# Patient Record
Sex: FEMALE | Race: WHITE | URBAN - METROPOLITAN AREA
[De-identification: names, ages, dates, MRNs, and addresses within clinical notes are randomized per-mention and may not be internally consistent; named-entity substitution may affect disease eponyms.]

---

## 2019-09-13 ENCOUNTER — INPATIENT (INPATIENT)
Facility: HOSPITAL | Age: 84
LOS: 3 days | Discharge: HOME | DRG: 536 | End: 2019-09-17
Attending: INTERNAL MEDICINE | Admitting: HOSPITALIST
Payer: MEDICARE

## 2019-09-13 VITALS
TEMPERATURE: 98 F | DIASTOLIC BLOOD PRESSURE: 65 MMHG | RESPIRATION RATE: 16 BRPM | HEIGHT: 63 IN | SYSTOLIC BLOOD PRESSURE: 112 MMHG | OXYGEN SATURATION: 95 % | WEIGHT: 139.99 LBS | HEART RATE: 78 BPM

## 2019-09-13 PROCEDURE — 99284 EMERGENCY DEPT VISIT MOD MDM: CPT

## 2019-09-13 PROCEDURE — 72170 X-RAY EXAM OF PELVIS: CPT | Mod: 26,59

## 2019-09-13 PROCEDURE — 73130 X-RAY EXAM OF HAND: CPT | Mod: 26,LT

## 2019-09-13 PROCEDURE — 73502 X-RAY EXAM HIP UNI 2-3 VIEWS: CPT | Mod: 26,LT

## 2019-09-13 PROCEDURE — 73552 X-RAY EXAM OF FEMUR 2/>: CPT | Mod: 26,LT

## 2019-09-13 PROCEDURE — 72192 CT PELVIS W/O DYE: CPT | Mod: 26

## 2019-09-13 RX ORDER — OXYCODONE AND ACETAMINOPHEN 5; 325 MG/1; MG/1
1 TABLET ORAL ONCE
Refills: 0 | Status: DISCONTINUED | OUTPATIENT
Start: 2019-09-13 | End: 2019-09-13

## 2019-09-13 RX ADMIN — OXYCODONE AND ACETAMINOPHEN 1 TABLET(S): 5; 325 TABLET ORAL at 20:06

## 2019-09-13 RX ADMIN — OXYCODONE AND ACETAMINOPHEN 1 TABLET(S): 5; 325 TABLET ORAL at 20:03
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
## 2019-09-13 NOTE — ED ADULT NURSE NOTE - NSIMPLEMENTINTERV_GEN_ALL_ED
Implemented All Fall Risk Interventions:  [place] to call system. Call bell, personal items and telephone within reach. Instruct patient to call for assistance. Room bathroom lighting operational. Non-slip footwear when patient is off stretcher. Physically safe environment: no spills, clutter or unnecessary equipment. Stretcher in lowest position, wheels locked, appropriate side rails in place. Provide visual cue, wrist band, yellow gown, etc. Monitor gait and stability. Monitor for mental status changes and reorient to person, place, and time. Review medications for side effects contributing to fall risk. Reinforce activity limits and safety measures with patient and family.
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
## 2019-09-14 DIAGNOSIS — Z98.1 ARTHRODESIS STATUS: Chronic | ICD-10-CM

## 2019-09-14 DIAGNOSIS — S72.002A FRACTURE OF UNSPECIFIED PART OF NECK OF LEFT FEMUR, INITIAL ENCOUNTER FOR CLOSED FRACTURE: ICD-10-CM

## 2019-09-14 DIAGNOSIS — I10 ESSENTIAL (PRIMARY) HYPERTENSION: ICD-10-CM

## 2019-09-14 DIAGNOSIS — Z98.890 OTHER SPECIFIED POSTPROCEDURAL STATES: Chronic | ICD-10-CM

## 2019-09-14 DIAGNOSIS — Z96.642 PRESENCE OF LEFT ARTIFICIAL HIP JOINT: Chronic | ICD-10-CM

## 2019-09-14 DIAGNOSIS — E78.5 HYPERLIPIDEMIA, UNSPECIFIED: ICD-10-CM

## 2019-09-14 DIAGNOSIS — E87.1 HYPO-OSMOLALITY AND HYPONATREMIA: ICD-10-CM

## 2019-09-14 DIAGNOSIS — S62.309A UNSPECIFIED FRACTURE OF UNSPECIFIED METACARPAL BONE, INITIAL ENCOUNTER FOR CLOSED FRACTURE: ICD-10-CM

## 2019-09-14 LAB
ALBUMIN SERPL ELPH-MCNC: 4.2 G/DL — SIGNIFICANT CHANGE UP (ref 3.3–5.2)
ALP SERPL-CCNC: 60 U/L — SIGNIFICANT CHANGE UP (ref 40–120)
ALT FLD-CCNC: 37 U/L — HIGH
ANION GAP SERPL CALC-SCNC: 11 MMOL/L — SIGNIFICANT CHANGE UP (ref 5–17)
ANION GAP SERPL CALC-SCNC: 12 MMOL/L — SIGNIFICANT CHANGE UP (ref 5–17)
ANION GAP SERPL CALC-SCNC: 13 MMOL/L — SIGNIFICANT CHANGE UP (ref 5–17)
APTT BLD: 29.2 SEC — SIGNIFICANT CHANGE UP (ref 27.5–36.3)
AST SERPL-CCNC: 39 U/L — HIGH
BASOPHILS # BLD AUTO: 0.02 K/UL — SIGNIFICANT CHANGE UP (ref 0–0.2)
BASOPHILS NFR BLD AUTO: 0.3 % — SIGNIFICANT CHANGE UP (ref 0–2)
BILIRUB SERPL-MCNC: 0.6 MG/DL — SIGNIFICANT CHANGE UP (ref 0.4–2)
BLD GP AB SCN SERPL QL: SIGNIFICANT CHANGE UP
BUN SERPL-MCNC: 15 MG/DL — SIGNIFICANT CHANGE UP (ref 8–20)
BUN SERPL-MCNC: 15 MG/DL — SIGNIFICANT CHANGE UP (ref 8–20)
BUN SERPL-MCNC: 17 MG/DL — SIGNIFICANT CHANGE UP (ref 8–20)
CALCIUM SERPL-MCNC: 8.9 MG/DL — SIGNIFICANT CHANGE UP (ref 8.6–10.2)
CALCIUM SERPL-MCNC: 9.1 MG/DL — SIGNIFICANT CHANGE UP (ref 8.6–10.2)
CALCIUM SERPL-MCNC: 9.2 MG/DL — SIGNIFICANT CHANGE UP (ref 8.6–10.2)
CHLORIDE SERPL-SCNC: 89 MMOL/L — LOW (ref 98–107)
CHLORIDE SERPL-SCNC: 90 MMOL/L — LOW (ref 98–107)
CHLORIDE SERPL-SCNC: 92 MMOL/L — LOW (ref 98–107)
CO2 SERPL-SCNC: 24 MMOL/L — SIGNIFICANT CHANGE UP (ref 22–29)
CO2 SERPL-SCNC: 26 MMOL/L — SIGNIFICANT CHANGE UP (ref 22–29)
CO2 SERPL-SCNC: 26 MMOL/L — SIGNIFICANT CHANGE UP (ref 22–29)
CREAT SERPL-MCNC: 0.54 MG/DL — SIGNIFICANT CHANGE UP (ref 0.5–1.3)
CREAT SERPL-MCNC: 0.7 MG/DL — SIGNIFICANT CHANGE UP (ref 0.5–1.3)
CREAT SERPL-MCNC: 0.82 MG/DL — SIGNIFICANT CHANGE UP (ref 0.5–1.3)
EOSINOPHIL # BLD AUTO: 0.24 K/UL — SIGNIFICANT CHANGE UP (ref 0–0.5)
EOSINOPHIL NFR BLD AUTO: 3.1 % — SIGNIFICANT CHANGE UP (ref 0–6)
GLUCOSE SERPL-MCNC: 117 MG/DL — HIGH (ref 70–115)
GLUCOSE SERPL-MCNC: 85 MG/DL — SIGNIFICANT CHANGE UP (ref 70–115)
GLUCOSE SERPL-MCNC: 98 MG/DL — SIGNIFICANT CHANGE UP (ref 70–115)
HCT VFR BLD CALC: 32.1 % — LOW (ref 34.5–45)
HGB BLD-MCNC: 11 G/DL — LOW (ref 11.5–15.5)
IMM GRANULOCYTES NFR BLD AUTO: 0.3 % — SIGNIFICANT CHANGE UP (ref 0–1.5)
INR BLD: 1.1 RATIO — SIGNIFICANT CHANGE UP (ref 0.88–1.16)
LYMPHOCYTES # BLD AUTO: 1.32 K/UL — SIGNIFICANT CHANGE UP (ref 1–3.3)
LYMPHOCYTES # BLD AUTO: 17.1 % — SIGNIFICANT CHANGE UP (ref 13–44)
MAGNESIUM SERPL-MCNC: 1.8 MG/DL — SIGNIFICANT CHANGE UP (ref 1.6–2.6)
MCHC RBC-ENTMCNC: 33 PG — SIGNIFICANT CHANGE UP (ref 27–34)
MCHC RBC-ENTMCNC: 34.3 GM/DL — SIGNIFICANT CHANGE UP (ref 32–36)
MCV RBC AUTO: 96.4 FL — SIGNIFICANT CHANGE UP (ref 80–100)
MONOCYTES # BLD AUTO: 0.75 K/UL — SIGNIFICANT CHANGE UP (ref 0–0.9)
MONOCYTES NFR BLD AUTO: 9.7 % — SIGNIFICANT CHANGE UP (ref 2–14)
NEUTROPHILS # BLD AUTO: 5.38 K/UL — SIGNIFICANT CHANGE UP (ref 1.8–7.4)
NEUTROPHILS NFR BLD AUTO: 69.5 % — SIGNIFICANT CHANGE UP (ref 43–77)
PHOSPHATE SERPL-MCNC: 4.1 MG/DL — SIGNIFICANT CHANGE UP (ref 2.4–4.7)
PLATELET # BLD AUTO: 176 K/UL — SIGNIFICANT CHANGE UP (ref 150–400)
POTASSIUM SERPL-MCNC: 3.8 MMOL/L — SIGNIFICANT CHANGE UP (ref 3.5–5.3)
POTASSIUM SERPL-MCNC: 3.8 MMOL/L — SIGNIFICANT CHANGE UP (ref 3.5–5.3)
POTASSIUM SERPL-MCNC: 4 MMOL/L — SIGNIFICANT CHANGE UP (ref 3.5–5.3)
POTASSIUM SERPL-SCNC: 3.8 MMOL/L — SIGNIFICANT CHANGE UP (ref 3.5–5.3)
POTASSIUM SERPL-SCNC: 3.8 MMOL/L — SIGNIFICANT CHANGE UP (ref 3.5–5.3)
POTASSIUM SERPL-SCNC: 4 MMOL/L — SIGNIFICANT CHANGE UP (ref 3.5–5.3)
PROT SERPL-MCNC: 7 G/DL — SIGNIFICANT CHANGE UP (ref 6.6–8.7)
PROTHROM AB SERPL-ACNC: 12.7 SEC — SIGNIFICANT CHANGE UP (ref 10–12.9)
RBC # BLD: 3.33 M/UL — LOW (ref 3.8–5.2)
RBC # FLD: 11.9 % — SIGNIFICANT CHANGE UP (ref 10.3–14.5)
SODIUM SERPL-SCNC: 127 MMOL/L — LOW (ref 135–145)
SODIUM SERPL-SCNC: 127 MMOL/L — LOW (ref 135–145)
SODIUM SERPL-SCNC: 129 MMOL/L — LOW (ref 135–145)
WBC # BLD: 7.73 K/UL — SIGNIFICANT CHANGE UP (ref 3.8–10.5)
WBC # FLD AUTO: 7.73 K/UL — SIGNIFICANT CHANGE UP (ref 3.8–10.5)

## 2019-09-14 PROCEDURE — 27230 TREAT THIGH FRACTURE: CPT | Mod: LT

## 2019-09-14 PROCEDURE — 99222 1ST HOSP IP/OBS MODERATE 55: CPT

## 2019-09-14 PROCEDURE — 73120 X-RAY EXAM OF HAND: CPT | Mod: 26,LT

## 2019-09-14 PROCEDURE — 12345: CPT | Mod: NC

## 2019-09-14 PROCEDURE — 99221 1ST HOSP IP/OBS SF/LOW 40: CPT | Mod: 57

## 2019-09-14 RX ORDER — ACETAMINOPHEN 500 MG
650 TABLET ORAL EVERY 6 HOURS
Refills: 0 | Status: DISCONTINUED | OUTPATIENT
Start: 2019-09-14 | End: 2019-09-17

## 2019-09-14 RX ORDER — ENOXAPARIN SODIUM 100 MG/ML
40 INJECTION SUBCUTANEOUS ONCE
Refills: 0 | Status: COMPLETED | OUTPATIENT
Start: 2019-09-14 | End: 2019-09-14

## 2019-09-14 RX ORDER — VENLAFAXINE HCL 75 MG
37.5 CAPSULE, EXT RELEASE 24 HR ORAL DAILY
Refills: 0 | Status: DISCONTINUED | OUTPATIENT
Start: 2019-09-14 | End: 2019-09-17

## 2019-09-14 RX ORDER — OXYCODONE AND ACETAMINOPHEN 5; 325 MG/1; MG/1
1 TABLET ORAL ONCE
Refills: 0 | Status: DISCONTINUED | OUTPATIENT
Start: 2019-09-14 | End: 2019-09-14

## 2019-09-14 RX ORDER — AMLODIPINE BESYLATE 2.5 MG/1
5 TABLET ORAL DAILY
Refills: 0 | Status: DISCONTINUED | OUTPATIENT
Start: 2019-09-14 | End: 2019-09-17

## 2019-09-14 RX ORDER — PANTOPRAZOLE SODIUM 20 MG/1
40 TABLET, DELAYED RELEASE ORAL
Refills: 0 | Status: DISCONTINUED | OUTPATIENT
Start: 2019-09-14 | End: 2019-09-17

## 2019-09-14 RX ORDER — ATORVASTATIN CALCIUM 80 MG/1
20 TABLET, FILM COATED ORAL AT BEDTIME
Refills: 0 | Status: DISCONTINUED | OUTPATIENT
Start: 2019-09-14 | End: 2019-09-17

## 2019-09-14 RX ORDER — OMEPRAZOLE 10 MG/1
1 CAPSULE, DELAYED RELEASE ORAL
Qty: 0 | Refills: 0 | DISCHARGE

## 2019-09-14 RX ORDER — METOPROLOL TARTRATE 50 MG
50 TABLET ORAL
Refills: 0 | Status: DISCONTINUED | OUTPATIENT
Start: 2019-09-14 | End: 2019-09-17

## 2019-09-14 RX ORDER — SODIUM CHLORIDE 9 MG/ML
1000 INJECTION INTRAMUSCULAR; INTRAVENOUS; SUBCUTANEOUS
Refills: 0 | Status: DISCONTINUED | OUTPATIENT
Start: 2019-09-14 | End: 2019-09-15

## 2019-09-14 RX ORDER — OXYCODONE AND ACETAMINOPHEN 5; 325 MG/1; MG/1
1 TABLET ORAL EVERY 6 HOURS
Refills: 0 | Status: DISCONTINUED | OUTPATIENT
Start: 2019-09-14 | End: 2019-09-17

## 2019-09-14 RX ORDER — DOCUSATE SODIUM 100 MG
100 CAPSULE ORAL
Refills: 0 | Status: DISCONTINUED | OUTPATIENT
Start: 2019-09-14 | End: 2019-09-17

## 2019-09-14 RX ORDER — ENOXAPARIN SODIUM 100 MG/ML
40 INJECTION SUBCUTANEOUS DAILY
Refills: 0 | Status: DISCONTINUED | OUTPATIENT
Start: 2019-09-15 | End: 2019-09-17

## 2019-09-14 RX ADMIN — SODIUM CHLORIDE 100 MILLILITER(S): 9 INJECTION INTRAMUSCULAR; INTRAVENOUS; SUBCUTANEOUS at 08:38

## 2019-09-14 RX ADMIN — ATORVASTATIN CALCIUM 20 MILLIGRAM(S): 80 TABLET, FILM COATED ORAL at 22:34

## 2019-09-14 RX ADMIN — ENOXAPARIN SODIUM 40 MILLIGRAM(S): 100 INJECTION SUBCUTANEOUS at 08:39

## 2019-09-14 RX ADMIN — PANTOPRAZOLE SODIUM 40 MILLIGRAM(S): 20 TABLET, DELAYED RELEASE ORAL at 08:38

## 2019-09-14 RX ADMIN — OXYCODONE AND ACETAMINOPHEN 1 TABLET(S): 5; 325 TABLET ORAL at 05:00

## 2019-09-14 RX ADMIN — Medication 100 MILLIGRAM(S): at 22:33

## 2019-09-14 RX ADMIN — OXYCODONE AND ACETAMINOPHEN 1 TABLET(S): 5; 325 TABLET ORAL at 23:30

## 2019-09-14 RX ADMIN — OXYCODONE AND ACETAMINOPHEN 1 TABLET(S): 5; 325 TABLET ORAL at 09:16

## 2019-09-14 RX ADMIN — OXYCODONE AND ACETAMINOPHEN 1 TABLET(S): 5; 325 TABLET ORAL at 22:34

## 2019-09-14 RX ADMIN — OXYCODONE AND ACETAMINOPHEN 1 TABLET(S): 5; 325 TABLET ORAL at 01:41
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
## 2019-09-14 NOTE — H&P ADULT - NSHPSOCIALHISTORY_GEN_ALL_CORE
Denies smoking  used to drink wine two glassess nightly , but stopped a few years ago  denies other drugs  lives with .

## 2019-09-14 NOTE — H&P ADULT - NSHPPHYSICALEXAM_GEN_ALL_CORE
Vital Signs Last 24 Hrs  T(C): 36.8 (14 Sep 2019 03:50), Max: 36.9 (13 Sep 2019 19:19)  T(F): 98.2 (14 Sep 2019 03:50), Max: 98.5 (13 Sep 2019 19:19)  HR: 60 (14 Sep 2019 03:50) (60 - 88)  BP: 100/62 (14 Sep 2019 03:50) (100/62 - 122/78)  BP(mean): --  RR: 19 (14 Sep 2019 03:50) (16 - 19)  SpO2: 96% (14 Sep 2019 03:50) (95% - 99%)    GENERAL: NAD  HEENT:  Atraumatic, Normocephalic, MMM, no oropharyngeal lesions  EYES: EOMI, PERRLA, conjunctiva and sclera clear  NECK: Supple, No JVD, no throat tenderness.  CHEST/LUNG: Clear to auscultation bilaterally; No wheezes, rales, or rhonchi  HEART: Regular rate and rhythm; No murmurs, rubs, or gallops  ABDOMEN: Soft, Nontender, Nondistended; Bowel sounds present  EXTREMITIES:  2+ Peripheral Pulses, able to move L hip with some pain.  PSYCH: AAOx3, normal affect  NEUROLOGY: moves all extremities spontaneously. no sensory deficits  SKIN: bruised, mottled skin Vital Signs Last 24 Hrs  T(C): 36.8 (14 Sep 2019 03:50), Max: 36.9 (13 Sep 2019 19:19)  T(F): 98.2 (14 Sep 2019 03:50), Max: 98.5 (13 Sep 2019 19:19)  HR: 60 (14 Sep 2019 03:50) (60 - 88)  BP: 100/62 (14 Sep 2019 03:50) (100/62 - 122/78)  BP(mean): --  RR: 19 (14 Sep 2019 03:50) (16 - 19)  SpO2: 96% (14 Sep 2019 03:50) (95% - 99%)    GENERAL: NAD  HEENT:  Atraumatic, Normocephalic, MMM, no oropharyngeal lesions  EYES: EOMI, PERRLA, conjunctiva and sclera clear  NECK: Supple, No JVD, no throat tenderness.  CHEST/LUNG: Clear to auscultation bilaterally; No wheezes, rales, or rhonchi  HEART: Regular rate and rhythm; No murmurs, rubs, or gallops  ABDOMEN: Soft, Nontender, Nondistended; Bowel sounds present  EXTREMITIES:  2+ Peripheral Pulses, able to move L hip with some pain. L hand wrapped.   PSYCH: AAOx3, normal affect  NEUROLOGY: moves all extremities spontaneously. no sensory deficits  SKIN: bruised, mottled skin

## 2019-09-14 NOTE — PATIENT PROFILE ADULT - NSPRONUTRITIONRISK_GEN_A_NUR
Enteral/parenteral nutrition prior to admission/Significant decrease of oral intake greater than 5 days prior to admission

## 2019-09-14 NOTE — H&P ADULT - PROBLEM SELECTOR PLAN 1
hip fracture, unable to ambulate  Ortho consulted  Still deciding whether will need surgery  will keep NPO until decision is made.   bed rest  PT
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
## 2019-09-14 NOTE — PROGRESS NOTE ADULT - SUBJECTIVE AND OBJECTIVE BOX
Ortho Post Op Check    Name: TABITHA MENDENHALL    MR #: [id number]    Procedure: left hand 5th MC fx being treated conservatively in ulna gutter splint  Surgeon: Dr Bolden     Pt comfortable without complaints, pain controlled  Denies CP, SOB, N/V, numbness/tingling     General Exam:  Vital Signs Last 24 Hrs  T(C): 36.4 (14 Sep 2019 08:35), Max: 36.9 (13 Sep 2019 19:19)  T(F): 97.6 (14 Sep 2019 08:35), Max: 98.5 (13 Sep 2019 19:19)  HR: 74 (14 Sep 2019 08:35) (60 - 88)  BP: 118/75 (14 Sep 2019 08:35) (100/62 - 122/78)  BP(mean): --  RR: 18 (14 Sep 2019 08:35) (16 - 19)  SpO2: 96% (14 Sep 2019 08:35) (95% - 99%)    General: Pt Alert and oriented, NAD, controlled pain.  Left hand splint removed to reveal swelling along the ulna volar aspect of hand at the 5th MC. Pain to palpation of the 5th MCP.  Pulses: 2+radial pulse. Cap refill < 2 sec.  Sensation: Grossly intact to light touch without deficit.  Motor: + full motor all digits                           11.0   7.73  )-----------( 176      ( 14 Sep 2019 01:11 )             32.1   14 Sep 2019 11:52    127    |  89     |  15.0   ----------------------------<  85     4.0     |  26.0   |  0.70     Ca    8.9        14 Sep 2019 11:52  Phos  4.1       14 Sep 2019 11:52  Mg     1.8       14 Sep 2019 11:52    TPro  7.0    /  Alb  4.2    /  TBili  0.6    /  DBili  x      /  AST  39     /  ALT  37     /  AlkPhos  60     14 Sep 2019 01:11    SPLINTING   PROCEDURE NOTE: Splinting    Performed by: Priti Jennings PA-C     Indication: [fracture left 5th proximal MC    The Left hand was appropriately positioned. A plaster ulna gutter splint was applied. Distally, the extremity was neurovascular intact following the procedure. The patient tolerated the procedure well.    A/P: 84yFemale with a left hand 5th MC fx  - Stable  - Pain Control  - DVT ppx: Lovenox  - Weight bearing status: Left hand NWB, May WB through elbow to use a platform walker
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
## 2019-09-14 NOTE — PHYSICAL THERAPY INITIAL EVALUATION ADULT - GENERAL OBSERVATIONS, REHAB EVAL
Pt received in bed chair, + IV Loc, +Tele, + NC O2, + Pt received in ED bed + IV. pt's Left UE in splint and ace bandage. pt in NAD and agreeable to mobilize with PT.
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
## 2019-09-14 NOTE — PHYSICAL THERAPY INITIAL EVALUATION ADULT - ACTIVE RANGE OF MOTION EXAMINATION, REHAB EVAL
left LE limited due to pain/Left LE Active ROM was WFL (within functional limits)/Right LE Active ROM was WFL (within functional limits)

## 2019-09-14 NOTE — H&P ADULT - NSHPLABSRESULTS_GEN_ALL_CORE
11.0   7.73  )-----------( 176      ( 14 Sep 2019 01:11 )             32.1       09-14    127<L>  |  90<L>  |  15.0  ----------------------------<  98  3.8   |  24.0  |  0.54    Ca    9.2      14 Sep 2019 01:11    TPro  7.0  /  Alb  4.2  /  TBili  0.6  /  DBili  x   /  AST  39<H>  /  ALT  37<H>  /  AlkPhos  60  09-14          PT/INR - ( 14 Sep 2019 01:11 )   PT: 12.7 sec;   INR: 1.10 ratio       PTT - ( 14 Sep 2019 01:11 )  PTT:29.2 sec    Lactate Trend      CAPILLARY BLOOD GLUCOSE    RADIOLOGY, EKG & ADDITIONAL TESTS: Reviewed.   < from: CT Pelvis No Cont (09.13.19 @ 23:27) >    FINDINGS:    Changes from left hip replacement is evident. There is subtrochanteric   periprosthetic fracture along the femoral component of the prosthesis in   the region of the proximal femoral diaphysis. Fracture is transversely   oriented without significant displacement.    Posterior lumbosacral fusion from L4 through S1 with transpedicular   screws, interconnecting rods, and intervertebral disc spaces is evident.   Mild generalized osteopenia noted.    Heavy atheromatous ossification along the aorta present.    IMPRESSION:     Acute subtrochanteric periprosthetic fracture associated with the femoral   component of the left hip replacement.    < end of copied text >

## 2019-09-14 NOTE — H&P ADULT - NSICDXPASTSURGICALHX_GEN_ALL_CORE_FT
PAST SURGICAL HISTORY:  History of total hip replacement, left     S/P arthroscopy of shoulder bilateral    S/P spinal fusion

## 2019-09-14 NOTE — PHYSICAL THERAPY INITIAL EVALUATION ADULT - ADDITIONAL COMMENTS
pt lives with  in house with 5 steps to enter, 12-13 stairs with Right rail inside to bedroom. pt has no DME at home.

## 2019-09-15 LAB
ANION GAP SERPL CALC-SCNC: 10 MMOL/L — SIGNIFICANT CHANGE UP (ref 5–17)
BUN SERPL-MCNC: 14 MG/DL — SIGNIFICANT CHANGE UP (ref 8–20)
CALCIUM SERPL-MCNC: 8.9 MG/DL — SIGNIFICANT CHANGE UP (ref 8.6–10.2)
CHLORIDE SERPL-SCNC: 93 MMOL/L — LOW (ref 98–107)
CO2 SERPL-SCNC: 25 MMOL/L — SIGNIFICANT CHANGE UP (ref 22–29)
CREAT ?TM UR-MCNC: 32 MG/DL — SIGNIFICANT CHANGE UP
CREAT SERPL-MCNC: 0.56 MG/DL — SIGNIFICANT CHANGE UP (ref 0.5–1.3)
GLUCOSE SERPL-MCNC: 89 MG/DL — SIGNIFICANT CHANGE UP (ref 70–115)
HCT VFR BLD CALC: 31.4 % — LOW (ref 34.5–45)
HGB BLD-MCNC: 10.6 G/DL — LOW (ref 11.5–15.5)
MCHC RBC-ENTMCNC: 33 PG — SIGNIFICANT CHANGE UP (ref 27–34)
MCHC RBC-ENTMCNC: 33.8 GM/DL — SIGNIFICANT CHANGE UP (ref 32–36)
MCV RBC AUTO: 97.8 FL — SIGNIFICANT CHANGE UP (ref 80–100)
OSMOLALITY UR: 217 MOSM/KG — LOW (ref 300–1000)
PLATELET # BLD AUTO: 166 K/UL — SIGNIFICANT CHANGE UP (ref 150–400)
POTASSIUM SERPL-MCNC: 3.6 MMOL/L — SIGNIFICANT CHANGE UP (ref 3.5–5.3)
POTASSIUM SERPL-SCNC: 3.6 MMOL/L — SIGNIFICANT CHANGE UP (ref 3.5–5.3)
RBC # BLD: 3.21 M/UL — LOW (ref 3.8–5.2)
RBC # FLD: 12.3 % — SIGNIFICANT CHANGE UP (ref 10.3–14.5)
SODIUM SERPL-SCNC: 128 MMOL/L — LOW (ref 135–145)
SODIUM UR-SCNC: <30 MMOL/L — SIGNIFICANT CHANGE UP
WBC # BLD: 3.9 K/UL — SIGNIFICANT CHANGE UP (ref 3.8–10.5)
WBC # FLD AUTO: 3.9 K/UL — SIGNIFICANT CHANGE UP (ref 3.8–10.5)

## 2019-09-15 PROCEDURE — 99232 SBSQ HOSP IP/OBS MODERATE 35: CPT

## 2019-09-15 RX ORDER — SODIUM CHLORIDE 9 MG/ML
1000 INJECTION INTRAMUSCULAR; INTRAVENOUS; SUBCUTANEOUS
Refills: 0 | Status: DISCONTINUED | OUTPATIENT
Start: 2019-09-15 | End: 2019-09-16

## 2019-09-15 RX ORDER — POTASSIUM CHLORIDE 20 MEQ
40 PACKET (EA) ORAL ONCE
Refills: 0 | Status: COMPLETED | OUTPATIENT
Start: 2019-09-15 | End: 2019-09-15

## 2019-09-15 RX ORDER — SODIUM CHLORIDE 9 MG/ML
1 INJECTION INTRAMUSCULAR; INTRAVENOUS; SUBCUTANEOUS THREE TIMES A DAY
Refills: 0 | Status: DISCONTINUED | OUTPATIENT
Start: 2019-09-15 | End: 2019-09-16

## 2019-09-15 RX ADMIN — Medication 50 MILLIGRAM(S): at 17:19

## 2019-09-15 RX ADMIN — OXYCODONE AND ACETAMINOPHEN 1 TABLET(S): 5; 325 TABLET ORAL at 23:02

## 2019-09-15 RX ADMIN — Medication 100 MILLIGRAM(S): at 17:19

## 2019-09-15 RX ADMIN — Medication 50 MILLIGRAM(S): at 05:14

## 2019-09-15 RX ADMIN — AMLODIPINE BESYLATE 5 MILLIGRAM(S): 2.5 TABLET ORAL at 05:14

## 2019-09-15 RX ADMIN — SODIUM CHLORIDE 1 GRAM(S): 9 INJECTION INTRAMUSCULAR; INTRAVENOUS; SUBCUTANEOUS at 23:02

## 2019-09-15 RX ADMIN — SODIUM CHLORIDE 100 MILLILITER(S): 9 INJECTION INTRAMUSCULAR; INTRAVENOUS; SUBCUTANEOUS at 11:23

## 2019-09-15 RX ADMIN — OXYCODONE AND ACETAMINOPHEN 1 TABLET(S): 5; 325 TABLET ORAL at 12:15

## 2019-09-15 RX ADMIN — OXYCODONE AND ACETAMINOPHEN 1 TABLET(S): 5; 325 TABLET ORAL at 11:12

## 2019-09-15 RX ADMIN — Medication 37.5 MILLIGRAM(S): at 11:12

## 2019-09-15 RX ADMIN — OXYCODONE AND ACETAMINOPHEN 1 TABLET(S): 5; 325 TABLET ORAL at 23:32

## 2019-09-15 RX ADMIN — Medication 40 MILLIEQUIVALENT(S): at 17:19

## 2019-09-15 RX ADMIN — Medication 100 MILLIGRAM(S): at 05:16

## 2019-09-15 RX ADMIN — PANTOPRAZOLE SODIUM 40 MILLIGRAM(S): 20 TABLET, DELAYED RELEASE ORAL at 05:14

## 2019-09-15 RX ADMIN — ENOXAPARIN SODIUM 40 MILLIGRAM(S): 100 INJECTION SUBCUTANEOUS at 11:12

## 2019-09-15 RX ADMIN — ATORVASTATIN CALCIUM 20 MILLIGRAM(S): 80 TABLET, FILM COATED ORAL at 23:02

## 2019-09-15 NOTE — CHART NOTE - NSCHARTNOTEFT_GEN_A_CORE
Called by RN to obtain Pts IV. Pt is receiving NaCl IVF for asymptomatic hyponatremia. While at bedside, attempted once but was unsuccessful. When attempting again, Pt asked me politely to not try again and that she "has had enough." Pt is A+Ox3, calm and pleasant. Case d/w Dr. Burt. Possible SIADH but ultimately, awaiting urine studies. Pt asked to provide urine samples to be collected and encouraged PO intake of food and fluids. Salt tabs added TID, first dose tonight and to repeat BMP. Plan d/w RN and Pt and Pts . Continue to observe.

## 2019-09-15 NOTE — PROGRESS NOTE ADULT - PROBLEM SELECTOR PLAN 3
to 128  suspect 2/2 poor po intake Vs SIADH - Urine studies pending  ct  cc/hr.  continue to monitor

## 2019-09-15 NOTE — PROGRESS NOTE ADULT - PROBLEM SELECTOR PLAN 1
Ortho following - conservative management advised  Pain control   bowel regimen  PT - rec Acute rehab - PMR consult ordered

## 2019-09-15 NOTE — PROGRESS NOTE ADULT - SUBJECTIVE AND OBJECTIVE BOX
TABITHA Luther    [id number]    84y      Female    CC: Lt hip pain s/p mechanical fall  Found to have Lt kervin-prosthetic hip fracture and Left 5th metacarpal fracture s/p closed reduction and splint  feels tired, just came back from therapy, walked without any issues but c/o pain after walking.  poor po intake.     INTERVAL HPI/OVERNIGHT EVENTS: no acute events overnight    REVIEW OF SYSTEMS:    CONSTITUTIONAL: No fever  RESPIRATORY: No cough, wheezing  No shortness of breath  CARDIOVASCULAR: No chest pain, palpitations  GASTROINTESTINAL: No abdominal or epigastric pain. No nausea, vomiting  NEUROLOGICAL: No headaches      Vital Signs Last 24 Hrs  T(C): 36.7 (15 Sep 2019 08:00), Max: 37.3 (14 Sep 2019 23:40)  T(F): 98 (15 Sep 2019 08:00), Max: 99.1 (14 Sep 2019 23:40)  HR: 63 (15 Sep 2019 08:00) (63 - 118)  BP: 114/72 (15 Sep 2019 08:00) (114/72 - 132/75)  BP(mean): --  RR: 19 (15 Sep 2019 08:00) (18 - 20)  SpO2: 95% (15 Sep 2019 08:00) (95% - 97%)    PHYSICAL EXAM:    GENERAL: NAD, well-groomed  HEENT: PERRL, +EOMI  NECK: soft, Supple,  CHEST/LUNG: Clear to percussion bilaterally; No wheezing  HEART: S1S2+, Regular rate and rhythm; No murmurs,  EXTREMITIES:  No clubbing, cyanosis, or edema  SKIN: No rashes or lesions  NEURO: AAOX3        LABS:                        10.6   3.90  )-----------( 166      ( 15 Sep 2019 08:56 )             31.4     09-15    128<L>  |  93<L>  |  14.0  ----------------------------<  89  3.6   |  25.0  |  0.56    Ca    8.9      15 Sep 2019 08:56  Phos  4.1     09-14  Mg     1.8     09-14    TPro  7.0  /  Alb  4.2  /  TBili  0.6  /  DBili  x   /  AST  39<H>  /  ALT  37<H>  /  AlkPhos  60  09-14    PT/INR - ( 14 Sep 2019 01:11 )   PT: 12.7 sec;   INR: 1.10 ratio         PTT - ( 14 Sep 2019 01:11 )  PTT:29.2 sec        MEDICATIONS  (STANDING):  amLODIPine   Tablet 5 milliGRAM(s) Oral daily  atorvastatin 20 milliGRAM(s) Oral at bedtime  docusate sodium 100 milliGRAM(s) Oral two times a day  enoxaparin Injectable 40 milliGRAM(s) SubCutaneous daily  metoprolol tartrate 50 milliGRAM(s) Oral two times a day  pantoprazole    Tablet 40 milliGRAM(s) Oral before breakfast  potassium chloride    Tablet ER 40 milliEquivalent(s) Oral once  sodium chloride 0.9%. 1000 milliLiter(s) (100 mL/Hr) IV Continuous <Continuous>  venlafaxine XR. 37.5 milliGRAM(s) Oral daily    MEDICATIONS  (PRN):  acetaminophen   Tablet .. 650 milliGRAM(s) Oral every 6 hours PRN Mild Pain (1 - 3), Moderate Pain (4 - 6)  oxyCODONE    5 mG/acetaminophen 325 mG 1 Tablet(s) Oral every 6 hours PRN Severe Pain (7 - 10)      RADIOLOGY & ADDITIONAL TESTS:

## 2019-09-15 NOTE — PROGRESS NOTE ADULT - SUBJECTIVE AND OBJECTIVE BOX
Ortho PA note    Name: TABITHA MENDENHALL    MR #: [id number]    Patient being followed for left hand 5th MC fx and Left hip periprosthetic fx   Surgeon: Dr Boo and Dr Mathur for hand    Pt states her pain is controlled with the medication. Patient states she had some paresthesias in her left hand along the ulna aspect of her forearm and into her ring and pinky fingers while using the platform walker with PT. The paresthesias have since resolved  Denies CP, SOB, N/V.    General Exam:  Vital Signs Last 24 Hrs  T(C): 36.7 (15 Sep 2019 08:00), Max: 37.3 (14 Sep 2019 23:40)  T(F): 98 (15 Sep 2019 08:00), Max: 99.1 (14 Sep 2019 23:40)  HR: 63 (15 Sep 2019 08:00) (63 - 118)  BP: 114/72 (15 Sep 2019 08:00) (114/72 - 132/75)  BP(mean): --  RR: 19 (15 Sep 2019 08:00) (18 - 20)  SpO2: 95% (15 Sep 2019 08:00) (95% - 97%)    General: Pt Alert and oriented, NAD, controlled pain.  Left hand ulna gutter splint intact. Ace wrap C/D/I. No bleeding.  Left leg observed with knee flexed and patient with magazine on her legs. Able to extend her legs without difficulty.   Patient kept adducting legs together, i advised patient of posterior hip precautions and placed the pillow between her legs.   Sensation: Grossly intact to light touch without deficit.  Motor: + EHL/FHL                          10.6   3.90  )-----------( 166      ( 15 Sep 2019 08:56 )             31.4   15 Sep 2019 08:56    128    |  93     |  14.0   ----------------------------<  89     3.6     |  25.0   |  0.56     Phos  4.1       14 Sep 2019 11:52  Mg     1.8       14 Sep 2019 11:52    TPro  7.0    /  Alb  4.2    /  TBili  0.6    /  DBili  x      /  AST  39     /  ALT  37     /  AlkPhos  60     14 Sep 2019 01:11    MEDICATIONS  (STANDING):  amLODIPine   Tablet 5 milliGRAM(s) Oral daily  atorvastatin 20 milliGRAM(s) Oral at bedtime  docusate sodium 100 milliGRAM(s) Oral two times a day  enoxaparin Injectable 40 milliGRAM(s) SubCutaneous daily  metoprolol tartrate 50 milliGRAM(s) Oral two times a day  pantoprazole    Tablet 40 milliGRAM(s) Oral before breakfast  potassium chloride    Tablet ER 40 milliEquivalent(s) Oral once  sodium chloride 1 Gram(s) Oral three times a day  sodium chloride 0.9%. 1000 milliLiter(s) (100 mL/Hr) IV Continuous <Continuous>  venlafaxine XR. 37.5 milliGRAM(s) Oral daily    MEDICATIONS  (PRN):  acetaminophen   Tablet .. 650 milliGRAM(s) Oral every 6 hours PRN Mild Pain (1 - 3), Moderate Pain (4 - 6)  oxyCODONE    5 mG/acetaminophen 325 mG 1 Tablet(s) Oral every 6 hours PRN Severe Pain (7 - 10)      A/P: 84yFemale Patient being followed for left hand 5th MC fx and Left hip periprosthetic fx   - Stable  - Pain Control  - DVT ppx: Lovenox  - Weight bearing status: PWB Left LE and NWB Left hand/wrist  - Ortho stable for DC to rehab.

## 2019-09-16 ENCOUNTER — TRANSCRIPTION ENCOUNTER (OUTPATIENT)
Age: 84
End: 2019-09-16

## 2019-09-16 LAB
ANION GAP SERPL CALC-SCNC: 11 MMOL/L — SIGNIFICANT CHANGE UP (ref 5–17)
BUN SERPL-MCNC: 15 MG/DL — SIGNIFICANT CHANGE UP (ref 8–20)
CALCIUM SERPL-MCNC: 9.2 MG/DL — SIGNIFICANT CHANGE UP (ref 8.6–10.2)
CHLORIDE SERPL-SCNC: 100 MMOL/L — SIGNIFICANT CHANGE UP (ref 98–107)
CO2 SERPL-SCNC: 24 MMOL/L — SIGNIFICANT CHANGE UP (ref 22–29)
CREAT SERPL-MCNC: 0.55 MG/DL — SIGNIFICANT CHANGE UP (ref 0.5–1.3)
GLUCOSE SERPL-MCNC: 87 MG/DL — SIGNIFICANT CHANGE UP (ref 70–115)
HCT VFR BLD CALC: 31.3 % — LOW (ref 34.5–45)
HGB BLD-MCNC: 10.3 G/DL — LOW (ref 11.5–15.5)
MCHC RBC-ENTMCNC: 32.8 PG — SIGNIFICANT CHANGE UP (ref 27–34)
MCHC RBC-ENTMCNC: 32.9 GM/DL — SIGNIFICANT CHANGE UP (ref 32–36)
MCV RBC AUTO: 99.7 FL — SIGNIFICANT CHANGE UP (ref 80–100)
PLATELET # BLD AUTO: 156 K/UL — SIGNIFICANT CHANGE UP (ref 150–400)
POTASSIUM SERPL-MCNC: 4.4 MMOL/L — SIGNIFICANT CHANGE UP (ref 3.5–5.3)
POTASSIUM SERPL-SCNC: 4.4 MMOL/L — SIGNIFICANT CHANGE UP (ref 3.5–5.3)
RBC # BLD: 3.14 M/UL — LOW (ref 3.8–5.2)
RBC # FLD: 12.4 % — SIGNIFICANT CHANGE UP (ref 10.3–14.5)
SODIUM SERPL-SCNC: 135 MMOL/L — SIGNIFICANT CHANGE UP (ref 135–145)
WBC # BLD: 3.99 K/UL — SIGNIFICANT CHANGE UP (ref 3.8–10.5)
WBC # FLD AUTO: 3.99 K/UL — SIGNIFICANT CHANGE UP (ref 3.8–10.5)

## 2019-09-16 PROCEDURE — 99232 SBSQ HOSP IP/OBS MODERATE 35: CPT

## 2019-09-16 PROCEDURE — 99223 1ST HOSP IP/OBS HIGH 75: CPT | Mod: GC

## 2019-09-16 RX ORDER — SENNA PLUS 8.6 MG/1
2 TABLET ORAL AT BEDTIME
Refills: 0 | Status: DISCONTINUED | OUTPATIENT
Start: 2019-09-16 | End: 2019-09-17

## 2019-09-16 RX ADMIN — OXYCODONE AND ACETAMINOPHEN 1 TABLET(S): 5; 325 TABLET ORAL at 08:30

## 2019-09-16 RX ADMIN — Medication 50 MILLIGRAM(S): at 17:29

## 2019-09-16 RX ADMIN — ATORVASTATIN CALCIUM 20 MILLIGRAM(S): 80 TABLET, FILM COATED ORAL at 21:08

## 2019-09-16 RX ADMIN — OXYCODONE AND ACETAMINOPHEN 1 TABLET(S): 5; 325 TABLET ORAL at 21:09

## 2019-09-16 RX ADMIN — Medication 37.5 MILLIGRAM(S): at 11:11

## 2019-09-16 RX ADMIN — SODIUM CHLORIDE 1 GRAM(S): 9 INJECTION INTRAMUSCULAR; INTRAVENOUS; SUBCUTANEOUS at 05:15

## 2019-09-16 RX ADMIN — AMLODIPINE BESYLATE 5 MILLIGRAM(S): 2.5 TABLET ORAL at 05:15

## 2019-09-16 RX ADMIN — OXYCODONE AND ACETAMINOPHEN 1 TABLET(S): 5; 325 TABLET ORAL at 08:31

## 2019-09-16 RX ADMIN — Medication 50 MILLIGRAM(S): at 05:15

## 2019-09-16 RX ADMIN — PANTOPRAZOLE SODIUM 40 MILLIGRAM(S): 20 TABLET, DELAYED RELEASE ORAL at 05:15

## 2019-09-16 RX ADMIN — OXYCODONE AND ACETAMINOPHEN 1 TABLET(S): 5; 325 TABLET ORAL at 22:10

## 2019-09-16 RX ADMIN — ENOXAPARIN SODIUM 40 MILLIGRAM(S): 100 INJECTION SUBCUTANEOUS at 11:11

## 2019-09-16 RX ADMIN — Medication 100 MILLIGRAM(S): at 17:29

## 2019-09-16 RX ADMIN — Medication 100 MILLIGRAM(S): at 05:15

## 2019-09-16 RX ADMIN — SENNA PLUS 2 TABLET(S): 8.6 TABLET ORAL at 21:08

## 2019-09-16 NOTE — OCCUPATIONAL THERAPY INITIAL EVALUATION ADULT - ASSISTIVE DEVICE FOR TOILET TRANSFER, REHAB EVAL
left platform RW; standard toilet seat with right grab bar; recommend use of commode over standard toilet at home to increase surface height and provide armrests

## 2019-09-16 NOTE — DISCHARGE NOTE PROVIDER - NSDCCPCAREPLAN_GEN_ALL_CORE_FT
PRINCIPAL DISCHARGE DIAGNOSIS  Diagnosis: Periprosthetic hip fracture  Assessment and Plan of Treatment: Orthopedic Recommendations: Patient will be PARTIAL weight bearing of Left Lower extremity with assistance of a walker. The patient will follow up in the office with Dr. Boo in 7 days for Left Hip. DVT prophylaxis with lovenox 40mg sq qd to be continued for 3-4 weeks.      SECONDARY DISCHARGE DIAGNOSES  Diagnosis: Metacarpal bone fracture  Assessment and Plan of Treatment: Left hand 5th digit   S/p reduction and ulna gutter splint placement.  Patient will be NON weight bearing of Left upper extremity. Patient will continue splint to Left upper extremity. Patient will follow up in the office with Dr. Amador in 7 days for Left hand.    Diagnosis: HLD (hyperlipidemia)  Assessment and Plan of Treatment: HLD (hyperlipidemia)    Diagnosis: HTN (hypertension)  Assessment and Plan of Treatment: Continue with medications as prescribed with metoprolol and norvasc. Your HCTZ was discontinued if medication needed for further blood pressure control then recommend up titration of norvasc from 5mg to 10mg orally daily. Avoid HCTZ because of hyponatremia.    Diagnosis: Hyponatremia  Assessment and Plan of Treatment: secondary to HCTZ, which was discontnued. Continue with oral hydration. PRINCIPAL DISCHARGE DIAGNOSIS  Diagnosis: Periprosthetic hip fracture  Assessment and Plan of Treatment: Orthopedic Recommendations: Patient will be PARTIAL weight bearing of Left Lower extremity with assistance of a walker. The patient will follow up in the office with Dr. Boo in 7 days for Left Hip. DVT prophylaxis with lovenox 40mg sq qd to be continued for 3-4 weeks.      SECONDARY DISCHARGE DIAGNOSES  Diagnosis: Metacarpal bone fracture  Assessment and Plan of Treatment: Left hand 5th digit   S/p reduction and ulna gutter splint placement.  Patient will be NON weight bearing of Left upper extremity. Patient will continue splint to Left upper extremity. Patient will follow up in the office with Dr. Amador in 7 days for Left hand.    Diagnosis: HTN (hypertension)  Assessment and Plan of Treatment: Continue with medications as prescribed with metoprolol and norvasc. Your HCTZ was discontinued if medication needed for further blood pressure control then recommend up titration of norvasc from 5mg to 10mg orally daily. Avoid HCTZ because of hyponatremia.    Diagnosis: HLD (hyperlipidemia)  Assessment and Plan of Treatment: HLD (hyperlipidemia)    Diagnosis: Hyperlipidemia  Assessment and Plan of Treatment: Continue with medication as prescribed.    Diagnosis: Depression  Assessment and Plan of Treatment: Continue with medication as prescribed.    Diagnosis: Hyponatremia  Assessment and Plan of Treatment: secondary to HCTZ, which was discontnued. Continue with oral hydration.
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
## 2019-09-16 NOTE — PROGRESS NOTE ADULT - SUBJECTIVE AND OBJECTIVE BOX
Patient is a 84y old  Female who presents with a chief complaint of hip fracture (16 Sep 2019 10:25)      HEALTH ISSUES - PROBLEM Dx:  HLD (hyperlipidemia): HLD (hyperlipidemia)  HTN (hypertension): HTN (hypertension)  Hyponatremia: Hyponatremia  Metacarpal bone fracture: Metacarpal bone fracture  Closed fracture of left hip, initial encounter: Closed fracture of left hip, initial encounter          INTERVAL HPI/OVERNIGHT EVENTS:  Patient seen and examined at bedside. No acute events overnight. Patient states     Vital Signs Last 24 Hrs  T(C): 36.9 (16 Sep 2019 08:28), Max: 36.9 (16 Sep 2019 08:28)  T(F): 98.4 (16 Sep 2019 08:28), Max: 98.4 (16 Sep 2019 08:28)  HR: 62 (16 Sep 2019 08:28) (62 - 77)  BP: 147/72 (16 Sep 2019 08:28) (118/64 - 147/72)  BP(mean): --  RR: 18 (16 Sep 2019 08:28) (16 - 19)  SpO2: 98% (16 Sep 2019 08:28) (98% - 98%)    CAPILLARY BLOOD GLUCOSE          I&O's Summary    16 Sep 2019 07:01  -  16 Sep 2019 16:23  --------------------------------------------------------  IN: 0 mL / OUT: 2 mL / NET: -2 mL        CONSTITUTIONAL: Well appearing, well nourished, awake, alert and in no apparent distress  CARDIAC: Normal rate, regular rhythm.  Heart sounds S1, S2.  No murmurs, rubs or gallops   RESPIRATORY: Breath sounds clear and equal bilaterally. No wheezes, rhales or rhonchi  GASTROENTEROLOGY: Soft nt nd bs   EXTREMITIES: No edema, cyanosis or deformity   NEUROLOGICAL: Alert and oriented, no focal deficits, no motor or sensory deficits.  SKIN: No rash, skin turgor wnl    MEDICATIONS  (STANDING):  amLODIPine   Tablet 5 milliGRAM(s) Oral daily  atorvastatin 20 milliGRAM(s) Oral at bedtime  docusate sodium 100 milliGRAM(s) Oral two times a day  enoxaparin Injectable 40 milliGRAM(s) SubCutaneous daily  metoprolol tartrate 50 milliGRAM(s) Oral two times a day  pantoprazole    Tablet 40 milliGRAM(s) Oral before breakfast  venlafaxine XR. 37.5 milliGRAM(s) Oral daily    MEDICATIONS  (PRN):  acetaminophen   Tablet .. 650 milliGRAM(s) Oral every 6 hours PRN Mild Pain (1 - 3), Moderate Pain (4 - 6)  oxyCODONE    5 mG/acetaminophen 325 mG 1 Tablet(s) Oral every 6 hours PRN Severe Pain (7 - 10)      LABS:                        10.3   3.99  )-----------( 156      ( 16 Sep 2019 08:10 )             31.3     09-16    135  |  100  |  15.0  ----------------------------<  87  4.4   |  24.0  |  0.55    Ca    9.2      16 Sep 2019 08:10              RADIOLOGY & ADDITIONAL TESTS: Patient is a 84y old  Female who presents with a chief complaint of hip fracture (16 Sep 2019 10:25) pending placement       HEALTH ISSUES - PROBLEM Dx:  HLD (hyperlipidemia): HLD (hyperlipidemia)  HTN (hypertension): HTN (hypertension)  Hyponatremia: Hyponatremia  Metacarpal bone fracture: Metacarpal bone fracture  Closed fracture of left hip, initial encounter: Closed fracture of left hip, initial encounter      INTERVAL HPI/OVERNIGHT EVENTS:  Patient seen and examined at bedside. No acute events overnight. Patient with  at bedside, understand they have been accepted to a [facility] and acute rehab in [facility], the  will drive the pt to the rehab in the morning at 10am. Pt otherwise with controlled pain. Has constipation but aside from this no other complaints. Patient denies chest pain, SOB, abd pain, N/V, fever, chills, dysuria or any other complaints. All remainder ROS negative.     Vital Signs Last 24 Hrs  T(C): 36.9 (16 Sep 2019 08:28), Max: 36.9 (16 Sep 2019 08:28)  T(F): 98.4 (16 Sep 2019 08:28), Max: 98.4 (16 Sep 2019 08:28)  HR: 62 (16 Sep 2019 08:28) (62 - 77)  BP: 147/72 (16 Sep 2019 08:28) (118/64 - 147/72)  BP(mean): --  RR: 18 (16 Sep 2019 08:28) (16 - 19)  SpO2: 98% (16 Sep 2019 08:28) (98% - 98%)    I&O's Summary    16 Sep 2019 07:01  -  16 Sep 2019 16:23  --------------------------------------------------------  IN: 0 mL / OUT: 2 mL / NET: -2 mL        CONSTITUTIONAL: Well appearing, well nourished, awake, alert and in no apparent distress  CARDIAC: Normal rate, regular rhythm.  Heart sounds S1, S2.  No murmurs, rubs or gallops   RESPIRATORY: Breath sounds clear and equal bilaterally. No wheezes, rhales or rhonchi  GASTROENTEROLOGY: Soft nt nd bs   EXTREMITIES: No edema, cyanosis or deformity   NEUROLOGICAL: Alert and oriented, no focal deficits, no motor or sensory deficits.  SKIN: No rash, skin turgor wnl    MEDICATIONS  (STANDING):  amLODIPine   Tablet 5 milliGRAM(s) Oral daily  atorvastatin 20 milliGRAM(s) Oral at bedtime  docusate sodium 100 milliGRAM(s) Oral two times a day  enoxaparin Injectable 40 milliGRAM(s) SubCutaneous daily  metoprolol tartrate 50 milliGRAM(s) Oral two times a day  pantoprazole    Tablet 40 milliGRAM(s) Oral before breakfast  venlafaxine XR. 37.5 milliGRAM(s) Oral daily    MEDICATIONS  (PRN):  acetaminophen   Tablet .. 650 milliGRAM(s) Oral every 6 hours PRN Mild Pain (1 - 3), Moderate Pain (4 - 6)  oxyCODONE    5 mG/acetaminophen 325 mG 1 Tablet(s) Oral every 6 hours PRN Severe Pain (7 - 10)      LABS:                        10.3   3.99  )-----------( 156      ( 16 Sep 2019 08:10 )             31.3     09-16    135  |  100  |  15.0  ----------------------------<  87  4.4   |  24.0  |  0.55    Ca    9.2      16 Sep 2019 08:10              RADIOLOGY & ADDITIONAL TESTS:

## 2019-09-16 NOTE — DISCHARGE NOTE PROVIDER - NSDCFUADDAPPT_GEN_ALL_CORE_FT
Please follow up with the physician at the rehab facility. Post rehab please follow up with your primary care physician and orthopedic surgery.

## 2019-09-16 NOTE — PROGRESS NOTE ADULT - SUBJECTIVE AND OBJECTIVE BOX
Name: TABITHA MENDENHALL    MR #: [id number]    Patient being followed for left hand 5th MC fx and Left hip periprosthetic fx   Surgeon: Dr Boo L hip periprosthetic fx and Dr Mahtur for Left hand      Patient seen and examined at the bedside. patient reports her pain is controlled with the prescribed medication. Patient reports she is ambulating with PT and will be working with them today. Denies CP, SOB, N/V, numbness, tingling. No acute sensory or motor changes. No new complaints                            10.6   3.90  )-----------( 166      ( 15 Sep 2019 08:56 )             31.4       15 Sep 2019 08:56    128    |  93     |  14.0   ----------------------------<  89     3.6     |  25.0   |  0.56     Phos  4.1       14 Sep 2019 11:52  Mg     1.8       14 Sep 2019 11:52        Vital Signs Last 24 Hrs  T(C): 36.8 (09-16-19 @ 05:10), Max: 36.8 (09-16-19 @ 05:10)  T(F): 98.2 (09-16-19 @ 05:10), Max: 98.2 (09-16-19 @ 05:10)  HR: 71 (09-16-19 @ 05:10) (71 - 71)  BP: 141/74 (09-16-19 @ 05:10) (141/74 - 141/74)  BP(mean): --  RR: 16 (09-16-19 @ 05:10) (16 - 16)  SpO2: 98% (09-16-19 @ 05:10) (98% - 98%)      General: Pt A&Ox3, NAD  LUE: Ulnar gutter splint intact. Ace wrap C/D/I. No bleeding. +ROM of digits 1-5, cap refill <2 seconds throughout, SILT.   LLE: patient sitting on bedpan. Patient able to range LLE without difficulty and minimal pain. DF/PF/EHL/FHL intact. SILT, Compartments soft compressible. Cap refill <2 seconds.     < from: CT Pelvis No Cont (09.13.19 @ 23:27) >     EXAM:  CT PELVIS ONLY                          PROCEDURE DATE:  09/13/2019          INTERPRETATION:  CLINICAL INDICATION: Fall on the left hip. Inability to   walk.    COMPARISON: Pelvic and hip radiographs 9/13/19    TECHNIQUE: Axial CT of the pelvis was performed without intravenous   contrast. Coronal and sagittal reformatted images were submitted.    FINDINGS:    Changes from left hip replacement is evident. There is subtrochanteric   periprosthetic fracture along the femoral component of the prosthesis in   the region of the proximal femoral diaphysis. Fracture is transversely   oriented without significant displacement.    Posterior lumbosacral fusion from L4 through S1 with transpedicular   screws, interconnecting rods, and intervertebral disc spaces is evident.   Mild generalized osteopenia noted.    Heavy atheromatous ossification along the aorta present.    IMPRESSION:     Acute subtrochanteric periprosthetic fracture associated with the femoral   component of the left hip replacement.    Findings were discussed with HAYDEE Ivey at 11:45 PM on 9/13/2019 who   indicated that the communication is understood. Orthopedic surgery will   be consulted.                KAYODE LEGGETT M.D., ATTENDING RADIOLOGIST  This document has been electronically signed. Sep 13 2019 11:58PM        < end of copied text >    A/P: 84yFemale Patient being followed for left hand 5th MC fx and Left hip periprosthetic fx   - Orthopedic Stable  - Pain Control  - DVT ppx: Lovenox  - Weight bearing status: PWB Left LE and NWB Left hand/wrist  - No surgical orthopedic intervention at this time. Patient will follow up outpatient with Dr Boo for L hip periprosthetic fx and Dr Mathur for Left hand for re-evaluation and discuss the possibility of future surgery. Name: TABITHA MENDENHALL    MR #: [id number]    Patient being followed for left hand 5th MC fx and Left hip periprosthetic fx   Surgeon: Dr Boo L hip periprosthetic fx and Dr Mathur for Left hand      Patient seen and examined at the bedside. patient reports her pain is controlled with the prescribed medication. Patient reports she is ambulating with PT and will be working with them today. Denies CP, SOB, N/V, numbness, tingling. No acute sensory or motor changes. No new complaints                                       10.3   3.99  )-----------( 156      ( 16 Sep 2019 08:10 )             31.3         Vital Signs Last 24 Hrs  T(C): 36.8 (09-16-19 @ 05:10), Max: 36.8 (09-16-19 @ 05:10)  T(F): 98.2 (09-16-19 @ 05:10), Max: 98.2 (09-16-19 @ 05:10)  HR: 71 (09-16-19 @ 05:10) (71 - 71)  BP: 141/74 (09-16-19 @ 05:10) (141/74 - 141/74)  BP(mean): --  RR: 16 (09-16-19 @ 05:10) (16 - 16)  SpO2: 98% (09-16-19 @ 05:10) (98% - 98%)      General: Pt A&Ox3, NAD  LUE: Ulnar gutter splint intact. Ace wrap C/D/I. No bleeding. +ROM of digits 1-5, cap refill <2 seconds throughout, SILT.   LLE: patient sitting on bedpan. Patient able to range LLE without difficulty and minimal pain. DF/PF/EHL/FHL intact. SILT, Compartments soft compressible. Cap refill <2 seconds.     < from: CT Pelvis No Cont (09.13.19 @ 23:27) >     EXAM:  CT PELVIS ONLY                          PROCEDURE DATE:  09/13/2019          INTERPRETATION:  CLINICAL INDICATION: Fall on the left hip. Inability to   walk.    COMPARISON: Pelvic and hip radiographs 9/13/19    TECHNIQUE: Axial CT of the pelvis was performed without intravenous   contrast. Coronal and sagittal reformatted images were submitted.    FINDINGS:    Changes from left hip replacement is evident. There is subtrochanteric   periprosthetic fracture along the femoral component of the prosthesis in   the region of the proximal femoral diaphysis. Fracture is transversely   oriented without significant displacement.    Posterior lumbosacral fusion from L4 through S1 with transpedicular   screws, interconnecting rods, and intervertebral disc spaces is evident.   Mild generalized osteopenia noted.    Heavy atheromatous ossification along the aorta present.    IMPRESSION:     Acute subtrochanteric periprosthetic fracture associated with the femoral   component of the left hip replacement.    Findings were discussed with HAYDEE Ivey at 11:45 PM on 9/13/2019 who   indicated that the communication is understood. Orthopedic surgery will   be consulted.                KAYODE LEGGETT M.D., ATTENDING RADIOLOGIST  This document has been electronically signed. Sep 13 2019 11:58PM        < end of copied text >    A/P: 84yFemale Patient being followed for left hand 5th MC fx and Left hip periprosthetic fx   - Orthopedic Stable  - Pain Control  - DVT ppx: Lovenox  - Weight bearing status: PWB Left LE and NWB Left hand/wrist  - No surgical orthopedic intervention at this time. Patient will follow up outpatient with Dr Boo for L hip periprosthetic fx and Dr Mathur for Left hand for re-evaluation and discuss the possibility of future surgery.

## 2019-09-16 NOTE — PROGRESS NOTE ADULT - ASSESSMENT
84M pmh HTN, HLD, L hip replacement, bilateral shoulder replacement presents with hip pain s/p mechanical fall found to have L subtrochanteric periprosthetic fracture and L 5th MCP fracture as well as hyponatremia.
84 F with hx of HTN, HLD, depression,  L hip replacement, bilateral shoulder replacement who presents s/p mechanical fall with L hip and L hand pain, noted on imaging with L subtrochanteric periprosthetic fracture and L 5th MCP fracture. Ortho consulted and fracture reduced and a plaster ulna gutter splint was applied to the L 5th MCP. Also recommended a trial of nonoperative management for left hip, with weight-bearing as tolerated as long as pain allows. Currently pain better controlled. Hospital course complicated by hyponatremia in the setting of dehydration/ pain and HCTZ use, HCTZ discontinued and pt hydrated with improvement in sodium levels. Evaluated by PT and PMNR and recommended for acute rehab, obtained authorization and anticipated discharge in the am.        L subtrochanteric periprosthetic fracture   - pain well controlled  - c/w tylenol high dose for mod pain and percoset for severe pain   - c/w WBAT   - DVT ppx   - Ortho f/u noted and appreciated and No surgical orthopedic intervention at this time. Patient will follow up outpatient with Dr Boo for L hip periprosthetic fx and Dr Mathur for Left hand for re-evaluation and discuss the possibility of future surgery. As per ortho If her pain is unrelenting after proximally 6-8 weeks and/or fracture propagates we can recommend total hip service for revision total hip arthroplasty/stem conversion.       L 5th MCP fracture  -pain well controlled  - plaster ulna gutter splint in place  - NWB Left hand/wrist  -Ortho f/u noted and appreciated    Hyponatremia   - in the setting of HCTZ, likely dehydration/ pain related  - HCTZ held since admission and advised pt not to restart  -s/p hydration and pt encouraged to drink free water  - urine lytes show no evidence of SIADH    HTN  - bp stable  - HCTZ discontinued  - c/w norvasc and metoprolol and if needed pt can up titrate norvasc    HLD  - c/w atorvastatin po qhs    Depression  - c/w venlafaxine po qd    DVT ppx  - c/w lovenox 40mg sq qd    Dispo: Anticipated discharge in 24 hours,  to  the pt at 10am and drive her to rehab. D/w CM and SW the plan of care.

## 2019-09-16 NOTE — OCCUPATIONAL THERAPY INITIAL EVALUATION ADULT - ADDITIONAL COMMENTS
Pt lives in house in [place] with 1 [address] and no steps inside; bedroom and bathroom are on main level. Bathroom has bathtub with curtains. Pt does not own any DME. Pt is right handed. Pt drives.

## 2019-09-16 NOTE — OCCUPATIONAL THERAPY INITIAL EVALUATION ADULT - RANGE OF MOTION EXAMINATION, UPPER EXTREMITY
right shoulder to about 75 degrees AROM (due to prior surgeries as per pt), right elbow AROM WFL, right gross grasp and digit extension AROM WFL; left shoulder AROM WFL, left elbow AROM WFL, left wrist and digits not tested

## 2019-09-16 NOTE — DISCHARGE NOTE PROVIDER - HOSPITAL COURSE
84 F with hx of HTN, HLD, depression,  L hip replacement, bilateral shoulder replacement who presents s/p mechanical fall with L hip and L hand pain, noted on imaging with L subtrochanteric periprosthetic fracture and L 5th MCP fracture. Ortho consulted and fracture reduced and a plaster ulna gutter splint was applied to the L 5th MCP. Also recommended a trial of nonoperative management for left hip, with weight-bearing as tolerated as long as pain allows. Currently pain better controlled. Hospital course complicated by hyponatremia in the setting of dehydration/ pain and HCTZ use, HCTZ discontinued and pt hydrated with improvement in sodium levels. Evaluated by PT and PMNR and recommended for acute rehab, obtained authorization and pt to c/w management per physician at the rehab.             Vital Signs Last 24 Hrs    T(C): 36.6 (17 Sep 2019 05:30), Max: 37.1 (17 Sep 2019 00:05)    T(F): 97.8 (17 Sep 2019 05:30), Max: 98.7 (17 Sep 2019 00:05)    HR: 82 (17 Sep 2019 05:30) (62 - 94)    BP: 137/68 (17 Sep 2019 05:30) (121/79 - 147/72)    BP(mean): --    RR: 18 (17 Sep 2019 05:30) (18 - 18)    SpO2: 97% (17 Sep 2019 05:30) (96% - 99%)                    CONSTITUTIONAL: Well appearing, well nourished, awake, alert and in no apparent distress    CARDIAC: Normal rate, regular rhythm.  Heart sounds S1, S2.  No murmurs, rubs or gallops     RESPIRATORY: Breath sounds clear and equal bilaterally. No wheezes, rhales or rhonchi    GASTROENTEROLOGY: Soft nt nd bs     EXTREMITIES: No edema, cyanosis or deformity    LUE in splint       NEUROLOGICAL: Alert and oriented, no focal deficits, no motor or sensory deficits.    SKIN: No rash, skin turgor wnl        Discharge planning took 46 minutes

## 2019-09-16 NOTE — DIETITIAN INITIAL EVALUATION ADULT. - OTHER INFO
84M pmh HTN, HLD, L hip replacement, bilateral shoulder replacement presents with hip pain s/p mechanical fall found to have L subtrochanteric periprosthetic fracture and L 5th MCP fracture as well as hyponatremia.      Pt tolerating diet, intake good 100% PO. Pt reports less of an appetite in the past few years "due to getting older" Pt also lost ~15lbs x3yr intentionally, diet & lifestyle changes, stopped drinking wine.   Otherwise, no complaints. No interest in nutrition supplement.

## 2019-09-16 NOTE — PROGRESS NOTE ADULT - ATTENDING COMMENTS
Attending statement:  I have personally seen this patient, and formed a face to face diagnostic evaluation on this patient on this date.  I have reviewed the PA, NP and or Medical/PA student and/or Resident documentation and agree with the history, physical exam and plan of care except if noted otherwise.     Patient is seen and examined on today's date is September 16 at approximately 4 PM. Patient's comfortable this point in time she states she ablated approximately 150 feet with a rolling walker. Patient is tolerating weightbearing relatively well we are going to continue with this mode of weightbearing/partial weight-bearing. I am under I am aware the patient is going to be transferred to acute rehabilitation [facility]. Patient's free to followup with this as needed for nonoperative management of this left periprosthetic fracture.

## 2019-09-16 NOTE — OCCUPATIONAL THERAPY INITIAL EVALUATION ADULT - MANUAL MUSCLE TESTING RESULTS, REHAB EVAL
right shoulder grossly assessed with partial AROM against gravity 2/5, right elbow grossly assessed with AROM against gravity 3/5, right gross grasp 3/5; left UE MMT not tested due to NWB status

## 2019-09-16 NOTE — OCCUPATIONAL THERAPY INITIAL EVALUATION ADULT - MODIFIED CLINICAL TEST OF SENSORY INTEGRATION IN BALANCE TEST
dynamic sitting edge of bed with supervision; dynamic standing with left platform RW with contact guard assistance

## 2019-09-16 NOTE — OCCUPATIONAL THERAPY INITIAL EVALUATION ADULT - LEVEL OF INDEPENDENCE:TOILET, OT EVAL
+urination on toilet during evaluation; right hand use for tasks/minimum assist (75% patients effort)

## 2019-09-16 NOTE — DISCHARGE NOTE PROVIDER - NSDCFUADDINST_GEN_ALL_CORE_FT
Orthopedic Recommendations: Patient will be NON weight bearing of Left upper extremity. Patient will continue splint to Left upper extremity. Patient will follow up in the office with Dr. Amador in 7 days for Left hand. Patient will be PARTIAL weight bearing of Left Lower extremity with assistance of a walker. The patient will follow up in the office with Dr. Boo in 7 days for Left Hip. DVT prophylaxis as per primary team.

## 2019-09-16 NOTE — OCCUPATIONAL THERAPY INITIAL EVALUATION ADULT - SENSORY TESTS
pt denies changes with sensation; unable to palpate radial pulse on left hand due to ace wrapping; pt with +bilateral pedal pulses; pt with +capillary refill in exposed left hand digits 1-3 (4-5 bandaged)

## 2019-09-16 NOTE — CONSULT NOTE ADULT - ATTENDING COMMENTS
Attending statement:  I have personally seen this patient, and formed a face to face diagnostic evaluation on this patient on this date.  I have reviewed the PA, NP and or Medical/PA student and/or Resident documentation and agree with the history, physical exam and plan of care except if noted otherwise.     At a very pleasant conversation with this patient and her  on September 14 at approximately 8 AM in emergency department bed 82. I've discussed the best of my ability her proximal left femur fracture that has greater trochanteric and intratrochanteric sub-trochanteric extension. The distal stem appears to be well fixed at this point in time. I do think this patient is able to undergo a trial of nonoperative management we can slowly progress her weight-bearing to weight-bearing as tolerated as long as pain allows. She has a compounding injury of a left fifth metacarpal fracture which may limit her ability to use the left upper extremity and participate with walker ambulation etc. Bed transfers to be either a wheelchair or a platform walker may be reasonable at this point in time. Patient be followed throughout her hospital stay here at [facility] followed up as an outpatient. If her pain is unrelenting after proximally 6-8 weeks and/or fracture propagates we can recommend total hip service for revision total hip arthroplasty/stem conversion or and open reduction internal fixation with the trauma service.
Patient seen and examined and cased discussed with resident. Agree with recommendations. Spoke to patient and  as well as CM about recommendation.     Rehab - Recommend ACUTE inpatient rehabilitation for the functional deficits consisting of 3 hours of therapy/day & 24 hour RN/daily PMR physician for comorbid medical management. Will continue to follow for ongoing rehab needs and recommendations. Patient will be able to tolerate 3 hours a day.    Continue bedside therapy as well as OOB throughout the day with mobilization throughout the day with staff to maintain cardiopulmonary function and prevention of secondary complications related to debility.

## 2019-09-16 NOTE — OCCUPATIONAL THERAPY INITIAL EVALUATION ADULT - PHYSICAL ASSIST/NONPHYSICAL ASSIST: STAND/SIT, REHAB EVAL
Acute Care - Physical Therapy Treatment Note  [facility]     Patient Name: Marie Dumas  : 1961  MRN: [id number]  Kelly Hall PTA Physical Therapy Assistant PT     08/02/16 -  Luca Mills, PT DPT Kelly Hall PTA Physical Therapy Assistant    LAVON Mills, Kelly Hall PTA Physical Therapy Assistant          Kelly Hall PTA  11/6/2017          verbal cues/nonverbal cues (demo/gestures)/1 person assist

## 2019-09-16 NOTE — DISCHARGE NOTE PROVIDER - CARE PROVIDER_API CALL
Dheeraj Boo (DO)  Orthopaedic Surgery  [address] [facility] [address]  [place] [address]  Phone: [phone]  Fax: [phone]  Follow Up Time:     Pilar Meneses)  Plastic Surgery; Surgery of the Hand  [address] [place] [id number]  Phone: [phone]  Fax: [phone]  Follow Up Time:

## 2019-09-16 NOTE — DISCHARGE NOTE PROVIDER - CARE PROVIDERS DIRECT ADDRESSES
[email or web address]

## 2019-09-17 ENCOUNTER — TRANSCRIPTION ENCOUNTER (OUTPATIENT)
Age: 84
End: 2019-09-17

## 2019-09-17 VITALS
OXYGEN SATURATION: 96 % | TEMPERATURE: 98 F | HEART RATE: 66 BPM | SYSTOLIC BLOOD PRESSURE: 139 MMHG | RESPIRATION RATE: 18 BRPM | DIASTOLIC BLOOD PRESSURE: 65 MMHG

## 2019-09-17 PROCEDURE — 99232 SBSQ HOSP IP/OBS MODERATE 35: CPT

## 2019-09-17 PROCEDURE — 99239 HOSP IP/OBS DSCHRG MGMT >30: CPT

## 2019-09-17 RX ORDER — AMLODIPINE BESYLATE 2.5 MG/1
1 TABLET ORAL
Qty: 0 | Refills: 0 | DISCHARGE
Start: 2019-09-17

## 2019-09-17 RX ORDER — AMLODIPINE BESYLATE 2.5 MG/1
1 TABLET ORAL
Qty: 0 | Refills: 0 | DISCHARGE

## 2019-09-17 RX ORDER — METOPROLOL TARTRATE 50 MG
1 TABLET ORAL
Qty: 0 | Refills: 0 | DISCHARGE

## 2019-09-17 RX ORDER — CELECOXIB 200 MG/1
1 CAPSULE ORAL
Qty: 0 | Refills: 0 | DISCHARGE

## 2019-09-17 RX ORDER — SENNA PLUS 8.6 MG/1
2 TABLET ORAL
Qty: 0 | Refills: 0 | DISCHARGE
Start: 2019-09-17

## 2019-09-17 RX ORDER — ENOXAPARIN SODIUM 100 MG/ML
40 INJECTION SUBCUTANEOUS
Qty: 0 | Refills: 0 | DISCHARGE
Start: 2019-09-17

## 2019-09-17 RX ORDER — LOSARTAN/HYDROCHLOROTHIAZIDE 100MG-25MG
1 TABLET ORAL
Qty: 0 | Refills: 0 | DISCHARGE

## 2019-09-17 RX ORDER — ATORVASTATIN CALCIUM 80 MG/1
1 TABLET, FILM COATED ORAL
Qty: 0 | Refills: 0 | DISCHARGE
Start: 2019-09-17

## 2019-09-17 RX ORDER — ATORVASTATIN CALCIUM 80 MG/1
1 TABLET, FILM COATED ORAL
Qty: 0 | Refills: 0 | DISCHARGE

## 2019-09-17 RX ORDER — VENLAFAXINE HCL 75 MG
1 CAPSULE, EXT RELEASE 24 HR ORAL
Qty: 0 | Refills: 0 | DISCHARGE
Start: 2019-09-17

## 2019-09-17 RX ORDER — VENLAFAXINE HCL 75 MG
1 CAPSULE, EXT RELEASE 24 HR ORAL
Qty: 0 | Refills: 0 | DISCHARGE

## 2019-09-17 RX ORDER — DOCUSATE SODIUM 100 MG
1 CAPSULE ORAL
Qty: 0 | Refills: 0 | DISCHARGE
Start: 2019-09-17

## 2019-09-17 RX ORDER — METOPROLOL TARTRATE 50 MG
1 TABLET ORAL
Qty: 0 | Refills: 0 | DISCHARGE
Start: 2019-09-17

## 2019-09-17 RX ORDER — ACETAMINOPHEN 500 MG
2 TABLET ORAL
Qty: 0 | Refills: 0 | DISCHARGE
Start: 2019-09-17

## 2019-09-17 RX ADMIN — AMLODIPINE BESYLATE 5 MILLIGRAM(S): 2.5 TABLET ORAL at 05:36

## 2019-09-17 RX ADMIN — Medication 100 MILLIGRAM(S): at 05:36

## 2019-09-17 RX ADMIN — PANTOPRAZOLE SODIUM 40 MILLIGRAM(S): 20 TABLET, DELAYED RELEASE ORAL at 05:37

## 2019-09-17 RX ADMIN — Medication 50 MILLIGRAM(S): at 05:37

## 2019-09-17 RX ADMIN — OXYCODONE AND ACETAMINOPHEN 1 TABLET(S): 5; 325 TABLET ORAL at 08:18

## 2019-09-17 NOTE — PROGRESS NOTE ADULT - SUBJECTIVE AND OBJECTIVE BOX
Patient seen earlier this AM.   Reports her pain is well controlled.  Plan is now for [facility], as she was denied by [facility].  [place] Plan is to drive there today.    REVIEW OF SYSTEMS  Constitutional - No fever,  No fatigue  HEENT - No vertigo, No neck pain  Neurological - No headaches, No memory loss, +loss of strength, No numbness, No tremors  Skin - No rashes, No lesions   Musculoskeletal - +joint pain, No joint swelling, +muscle pain  Psychiatric - No depression, No anxiety    FUNCTIONAL PROGRESS  9/16  Bed Mobility  Bed Mobility Training Sit-to-Supine: minimum assist (75% patient effort);  1 person assist  Bed Mobility Training Supine-to-Sit: minimum assist (75% patient effort)    Sit-Stand Transfer Training  Transfer Training Sit-to-Stand Transfer: minimum assist (75% patient effort);  1 person assist;  rolling walker;  platform;  PWB L LE/  NWB L HAND  Transfer Training Stand-to-Sit Transfer: minimum assist (75% patient effort);  1 person assist;  PWB L LE/  NWB L HAND   rolling walker;  Platform  Sit-to-Stand Transfer Training Transfer Safety Analysis: decreased balance;  decreased strength    Gait Training  Gait Training: minimum assist (75% patient effort);  1 person assist;  rolling walker;  25 feet;  PWB L LE/  NWB L HAND  Gait Analysis: 3-point gait   decreased step length;  impaired balance;  decreased strength;  25 feet;  rolling walker;  platform  Gait Number of Times:: x 1        VITALS  T(C): 36.7 (09-17-19 @ 08:00), Max: 37.1 (09-17-19 @ 00:05)  HR: 66 (09-17-19 @ 08:00) (66 - 94)  BP: 139/65 (09-17-19 @ 08:00) (121/79 - 139/65)  RR: 18 (09-17-19 @ 08:00) (18 - 18)  SpO2: 96% (09-17-19 @ 08:00) (96% - 99%)  Wt(kg): --    MEDICATIONS   acetaminophen   Tablet .. 650 milliGRAM(s) every 6 hours PRN  amLODIPine   Tablet 5 milliGRAM(s) daily  atorvastatin 20 milliGRAM(s) at bedtime  docusate sodium 100 milliGRAM(s) two times a day  enoxaparin Injectable 40 milliGRAM(s) daily  metoprolol tartrate 50 milliGRAM(s) two times a day  oxyCODONE    5 mG/acetaminophen 325 mG 1 Tablet(s) every 6 hours PRN  pantoprazole    Tablet 40 milliGRAM(s) before breakfast  senna 2 Tablet(s) at bedtime  venlafaxine XR. 37.5 milliGRAM(s) daily      RECENT LABS - Reviewed                        10.3   3.99  )-----------( 156      ( 16 Sep 2019 08:10 )             31.3     09-16    135  |  100  |  15.0  ----------------------------<  87  4.4   |  24.0  |  0.55    Ca    9.2      16 Sep 2019 08:10              ----------------------------------------------------------------------------------------  PHYSICAL EXAM  Constitutional - NAD, Comfortable  Extremities - +L hand in ulnar gutter splint. Able to wiggle fingers. No C/C/E, No calf tenderness   Neurologic Exam -                     Motor -                      LEFT    LE - HF 2/5, KE 2/5 - limited 2/2 pain, DF 5/5, PF 5/5                    RIGHT LE - HF 5/5, KE 5/5, DF 5/5, PF 5/5        Sensory - Intact to LT    Psychiatric - Mood stable, Affect WNL  ----------------------------------------------------------------------------------------  ASSESSMENT/PLAN  84y Female with functional deficits after L hip periprosthetic fx and L 5th MCP fx    L hip fx - PWB, pain control.   L hand fx - NWB. F/u with Dr. Mathur outpatient.   Pain - tylenol, percocet  HTN - amlodipine, HTN  HLD - atorvastatin  mood - effexor  GI/bowel - colace, protonix  DVT PPX - lovenox  Rehab - Patient meets criteria for AR, but was not accepted by her AR of choice Patient is happy about the [facility] in [place] town. Planned to be driven by .     Will sign off, please reconsult for additional rehab dispo needs if functional status changes.

## 2019-09-17 NOTE — DISCHARGE NOTE NURSING/CASE MANAGEMENT/SOCIAL WORK - PATIENT PORTAL LINK FT
You can access the FollowMyHealth Patient Portal offered by [facility] by registering at the following website: [email or web address]. By joining [facility]’s FollowMyHealth portal, you will also be able to view your health information using other applications (apps) compatible with our system.

## 2019-10-02 PROCEDURE — 97163 PT EVAL HIGH COMPLEX 45 MIN: CPT

## 2019-10-02 PROCEDURE — 36415 COLL VENOUS BLD VENIPUNCTURE: CPT

## 2019-10-02 PROCEDURE — 84100 ASSAY OF PHOSPHORUS: CPT

## 2019-10-02 PROCEDURE — 73502 X-RAY EXAM HIP UNI 2-3 VIEWS: CPT

## 2019-10-02 PROCEDURE — 80048 BASIC METABOLIC PNL TOTAL CA: CPT

## 2019-10-02 PROCEDURE — 85027 COMPLETE CBC AUTOMATED: CPT

## 2019-10-02 PROCEDURE — 73120 X-RAY EXAM OF HAND: CPT

## 2019-10-02 PROCEDURE — 99285 EMERGENCY DEPT VISIT HI MDM: CPT

## 2019-10-02 PROCEDURE — 83735 ASSAY OF MAGNESIUM: CPT

## 2019-10-02 PROCEDURE — 85730 THROMBOPLASTIN TIME PARTIAL: CPT

## 2019-10-02 PROCEDURE — 80053 COMPREHEN METABOLIC PANEL: CPT

## 2019-10-02 PROCEDURE — 86900 BLOOD TYPING SEROLOGIC ABO: CPT

## 2019-10-02 PROCEDURE — 84300 ASSAY OF URINE SODIUM: CPT

## 2019-10-02 PROCEDURE — 97110 THERAPEUTIC EXERCISES: CPT

## 2019-10-02 PROCEDURE — 73552 X-RAY EXAM OF FEMUR 2/>: CPT

## 2019-10-02 PROCEDURE — 72170 X-RAY EXAM OF PELVIS: CPT

## 2019-10-02 PROCEDURE — 72192 CT PELVIS W/O DYE: CPT

## 2019-10-02 PROCEDURE — 97530 THERAPEUTIC ACTIVITIES: CPT

## 2019-10-02 PROCEDURE — 97167 OT EVAL HIGH COMPLEX 60 MIN: CPT

## 2019-10-02 PROCEDURE — 73130 X-RAY EXAM OF HAND: CPT

## 2019-10-02 PROCEDURE — 86850 RBC ANTIBODY SCREEN: CPT

## 2019-10-02 PROCEDURE — 86901 BLOOD TYPING SEROLOGIC RH(D): CPT

## 2019-10-02 PROCEDURE — 85610 PROTHROMBIN TIME: CPT

## 2019-10-02 PROCEDURE — 83935 ASSAY OF URINE OSMOLALITY: CPT

## 2019-10-02 PROCEDURE — 82570 ASSAY OF URINE CREATININE: CPT

## 2019-10-02 PROCEDURE — 97116 GAIT TRAINING THERAPY: CPT

## 2019-11-06 NOTE — PHYSICAL THERAPY INITIAL EVALUATION ADULT - DISCHARGE DISPOSITION, PT EVAL
November 6, 2019     Patient: Angi Eddy   YOB: 2001   Date of Visit: 11/6/2019       To Whom it May Concern:    Angi Eddy is under my professional care  She was seen in my office on 11/6/2019  Due to a medical condition I recommend that she has notes printed/provided for class  If you have any questions or concerns, please don't hesitate to call           Sincerely,          Marlo Wilder MD        CC: No Recipients rehabilitation facility/Acute

## 2021-02-14 NOTE — ED CLERICAL - DIVISION
Arthur... Benefits, risks, and possible complications of procedure explained to patient/caregiver who verbalized understanding and gave written consent.

## 2022-07-27 NOTE — DISCHARGE NOTE PROVIDER - DISCHARGE DATE
From: Magui Still  To: PAWEL Villafana  Sent: 7/27/2022 11:32 AM CDT  Subject: My sugar level is high    Dear Md. Boss  I am on Ozempic .5 doze for three wet now but my blood sugar level is around 170 in the morning. I tried to get an appointment with you but the earliest time that was available was October 1 which I got. Can I increase the Ozempic to 1?      Minerva Taylor 17-Sep-2019

## 2023-05-19 PROBLEM — I10 ESSENTIAL (PRIMARY) HYPERTENSION: Chronic | Status: ACTIVE | Noted: 2019-09-14

## 2023-05-19 PROBLEM — E78.5 HYPERLIPIDEMIA, UNSPECIFIED: Chronic | Status: ACTIVE | Noted: 2019-09-14

## 2023-05-24 PROBLEM — Z00.00 ENCOUNTER FOR PREVENTIVE HEALTH EXAMINATION: Status: ACTIVE | Noted: 2023-05-24

## 2023-05-25 ENCOUNTER — APPOINTMENT (OUTPATIENT)
Dept: CT IMAGING | Facility: CLINIC | Age: 88
End: 2023-05-25
Payer: MEDICARE

## 2023-05-25 ENCOUNTER — OUTPATIENT (OUTPATIENT)
Dept: OUTPATIENT SERVICES | Facility: HOSPITAL | Age: 88
LOS: 1 days | End: 2023-05-25
Payer: MEDICARE

## 2023-05-25 DIAGNOSIS — Z00.8 ENCOUNTER FOR OTHER GENERAL EXAMINATION: ICD-10-CM

## 2023-05-25 DIAGNOSIS — Z98.890 OTHER SPECIFIED POSTPROCEDURAL STATES: Chronic | ICD-10-CM

## 2023-05-25 DIAGNOSIS — Z96.642 PRESENCE OF LEFT ARTIFICIAL HIP JOINT: Chronic | ICD-10-CM

## 2023-05-25 DIAGNOSIS — Z98.1 ARTHRODESIS STATUS: Chronic | ICD-10-CM

## 2023-05-25 PROCEDURE — 73700 CT LOWER EXTREMITY W/O DYE: CPT | Mod: 26,RT,MH

## 2023-05-25 PROCEDURE — 76376 3D RENDER W/INTRP POSTPROCES: CPT | Mod: 26

## 2023-05-25 PROCEDURE — 76376 3D RENDER W/INTRP POSTPROCES: CPT

## 2023-05-25 PROCEDURE — 73700 CT LOWER EXTREMITY W/O DYE: CPT | Mod: MH

## 2024-06-24 ENCOUNTER — APPOINTMENT (OUTPATIENT)
Dept: ORTHOPEDIC SURGERY | Facility: CLINIC | Age: 89
End: 2024-06-24
Payer: MEDICARE

## 2024-06-24 PROCEDURE — 99205 OFFICE O/P NEW HI 60 MIN: CPT

## 2024-06-24 PROCEDURE — 73562 X-RAY EXAM OF KNEE 3: CPT | Mod: RT

## 2024-06-24 PROCEDURE — G2211 COMPLEX E/M VISIT ADD ON: CPT

## 2024-06-24 NOTE — ED STATDOCS - PRO INTERPRETER NEED 2
Please review question from patient for taking ibuprofen and losartan on short term basis and dosing questions.  Thanks.   English

## 2024-06-25 ENCOUNTER — OUTPATIENT (OUTPATIENT)
Dept: OUTPATIENT SERVICES | Facility: HOSPITAL | Age: 89
LOS: 1 days | End: 2024-06-25

## 2024-06-25 ENCOUNTER — APPOINTMENT (OUTPATIENT)
Dept: MRI IMAGING | Facility: CLINIC | Age: 89
End: 2024-06-25

## 2024-06-25 DIAGNOSIS — Z47.1 AFTERCARE FOLLOWING JOINT REPLACEMENT SURGERY: ICD-10-CM

## 2024-06-25 DIAGNOSIS — Z96.642 PRESENCE OF LEFT ARTIFICIAL HIP JOINT: Chronic | ICD-10-CM

## 2024-06-25 PROCEDURE — 73721 MRI JNT OF LWR EXTRE W/O DYE: CPT | Mod: 26,RT

## 2024-07-01 ENCOUNTER — APPOINTMENT (OUTPATIENT)
Dept: ORTHOPEDIC SURGERY | Facility: CLINIC | Age: 89
End: 2024-07-01

## 2024-07-01 PROBLEM — Z47.1 AFTERCARE FOLLOWING RIGHT KNEE JOINT REPLACEMENT SURGERY: Status: ACTIVE | Noted: 2024-06-24

## 2024-07-01 PROBLEM — T84.53XA INFECTION OF TOTAL RIGHT KNEE REPLACEMENT: Status: ACTIVE | Noted: 2024-06-24

## 2024-07-01 PROBLEM — S86.811D RUPTURE OF RIGHT PATELLAR TENDON, SUBSEQUENT ENCOUNTER: Status: ACTIVE | Noted: 2024-07-01

## 2024-07-01 PROCEDURE — 20610 DRAIN/INJ JOINT/BURSA W/O US: CPT | Mod: RT

## 2024-07-01 PROCEDURE — 99214 OFFICE O/P EST MOD 30 MIN: CPT | Mod: 25

## 2024-07-01 PROCEDURE — G2211 COMPLEX E/M VISIT ADD ON: CPT

## 2024-07-02 LAB
B PERT IGG+IGM PNL SER: ABNORMAL
COLOR FLD: NORMAL
EOSINOPHIL # FLD MANUAL: 0 %
FLUID INTAKE SUBSTANCE CLASS: NORMAL
LYMPHOCYTES # FLD MANUAL: 2 %
MESOTHL CELL NFR FLD: 0 %
MONOS+MACROS NFR FLD MANUAL: 9 %
NEUTS SEG # FLD MANUAL: 89 %
NRBC # FLD: 0 %
RBC # FLD MANUAL: ABNORMAL /UL
SYCRY CLARITY: ABNORMAL
SYCRY COLOR: ABNORMAL
SYCRY ID: NORMAL
SYCRY TUBE: NORMAL
TOTAL CELLS COUNTED FLD: NORMAL /UL
TUBE TYPE: NORMAL
UNIDENT CELLS NFR FLD MANUAL: 0 %
VARIANT LYMPHS # FLD MANUAL: 0 %

## 2024-07-03 ENCOUNTER — APPOINTMENT (OUTPATIENT)
Dept: ORTHOPEDIC SURGERY | Facility: CLINIC | Age: 89
End: 2024-07-03
Payer: MEDICARE

## 2024-07-03 PROCEDURE — 99443: CPT | Mod: 93

## 2024-07-05 ENCOUNTER — TRANSCRIPTION ENCOUNTER (OUTPATIENT)
Age: 89
End: 2024-07-05

## 2024-07-08 ENCOUNTER — APPOINTMENT (OUTPATIENT)
Dept: ORTHOPEDIC SURGERY | Facility: CLINIC | Age: 89
End: 2024-07-08

## 2024-07-08 ENCOUNTER — LABORATORY RESULT (OUTPATIENT)
Age: 89
End: 2024-07-08

## 2024-07-08 VITALS
WEIGHT: 110 LBS | SYSTOLIC BLOOD PRESSURE: 134 MMHG | HEIGHT: 62 IN | DIASTOLIC BLOOD PRESSURE: 64 MMHG | HEART RATE: 88 BPM | BODY MASS INDEX: 20.24 KG/M2

## 2024-07-08 PROCEDURE — 20610 DRAIN/INJ JOINT/BURSA W/O US: CPT | Mod: RT

## 2024-07-08 PROCEDURE — 99215 OFFICE O/P EST HI 40 MIN: CPT | Mod: 25

## 2024-07-11 LAB
CRP SERPL-MCNC: 63 MG/L
DEPRECATED D DIMER PPP IA-ACNC: 897 NG/ML DDU
ERYTHROCYTE [SEDIMENTATION RATE] IN BLOOD BY WESTERGREN METHOD: 76 MM/HR

## 2024-07-13 ENCOUNTER — NON-APPOINTMENT (OUTPATIENT)
Age: 89
End: 2024-07-13

## 2024-07-15 ENCOUNTER — APPOINTMENT (OUTPATIENT)
Dept: ORTHOPEDIC SURGERY | Facility: CLINIC | Age: 89
End: 2024-07-15
Payer: MEDICARE

## 2024-07-15 ENCOUNTER — NON-APPOINTMENT (OUTPATIENT)
Age: 89
End: 2024-07-15

## 2024-07-15 DIAGNOSIS — Z96.659 MECHANICAL LOOSENING OF OTHER INTERNAL PROSTHETIC JOINT, INITIAL ENCOUNTER: ICD-10-CM

## 2024-07-15 DIAGNOSIS — T84.038A MECHANICAL LOOSENING OF OTHER INTERNAL PROSTHETIC JOINT, INITIAL ENCOUNTER: ICD-10-CM

## 2024-07-15 DIAGNOSIS — Z47.1 AFTERCARE FOLLOWING JOINT REPLACEMENT SURGERY: ICD-10-CM

## 2024-07-15 DIAGNOSIS — S86.811D STRAIN OF OTHER MUSCLE(S) AND TENDON(S) AT LOWER LEG LEVEL, RIGHT LEG, SUBSEQUENT ENCOUNTER: ICD-10-CM

## 2024-07-15 DIAGNOSIS — T84.53XA INFECTION AND INFLAMMATORY REACTION DUE TO INTERNAL RIGHT KNEE PROSTHESIS, INITIAL ENCOUNTER: ICD-10-CM

## 2024-07-15 DIAGNOSIS — Z96.651 AFTERCARE FOLLOWING JOINT REPLACEMENT SURGERY: ICD-10-CM

## 2024-07-15 LAB — JOINT CULTURE: NORMAL

## 2024-07-15 PROCEDURE — 99442: CPT | Mod: 93

## 2024-07-18 ENCOUNTER — RESULT REVIEW (OUTPATIENT)
Age: 89
End: 2024-07-18

## 2024-07-18 ENCOUNTER — TRANSCRIPTION ENCOUNTER (OUTPATIENT)
Age: 89
End: 2024-07-18

## 2024-07-19 ENCOUNTER — APPOINTMENT (OUTPATIENT)
Dept: ORTHOPEDIC SURGERY | Facility: HOSPITAL | Age: 89
End: 2024-07-19

## 2024-07-19 ENCOUNTER — TRANSCRIPTION ENCOUNTER (OUTPATIENT)
Age: 89
End: 2024-07-19

## 2024-07-25 ENCOUNTER — TRANSCRIPTION ENCOUNTER (OUTPATIENT)
Age: 89
End: 2024-07-25

## 2024-07-26 ENCOUNTER — NON-APPOINTMENT (OUTPATIENT)
Age: 89
End: 2024-07-26

## 2024-08-12 ENCOUNTER — APPOINTMENT (OUTPATIENT)
Dept: ORTHOPEDIC SURGERY | Facility: CLINIC | Age: 89
End: 2024-08-12
Payer: MEDICARE

## 2024-08-12 DIAGNOSIS — T84.53XA INFECTION AND INFLAMMATORY REACTION DUE TO INTERNAL RIGHT KNEE PROSTHESIS, INITIAL ENCOUNTER: ICD-10-CM

## 2024-08-12 DIAGNOSIS — S86.811D STRAIN OF OTHER MUSCLE(S) AND TENDON(S) AT LOWER LEG LEVEL, RIGHT LEG, SUBSEQUENT ENCOUNTER: ICD-10-CM

## 2024-08-12 DIAGNOSIS — Z47.1 AFTERCARE FOLLOWING JOINT REPLACEMENT SURGERY: ICD-10-CM

## 2024-08-12 DIAGNOSIS — Z96.651 AFTERCARE FOLLOWING JOINT REPLACEMENT SURGERY: ICD-10-CM

## 2024-08-12 PROCEDURE — 73562 X-RAY EXAM OF KNEE 3: CPT | Mod: RT

## 2024-08-12 PROCEDURE — 99024 POSTOP FOLLOW-UP VISIT: CPT

## 2024-08-13 NOTE — HISTORY OF PRESENT ILLNESS
[___ Weeks Post Op] : [unfilled] weeks post op [3] : the patient reports pain that is 3/10 in severity [Clean/Dry/Intact] : clean, dry and intact [Healed] : healed [Discharge] : noted to have a ~M discharge [Swelling] : swollen [Neuro Intact] : an unremarkable neurological exam [Vascular Intact] : ~T peripheral vascular exam normal [Negative Sonal's] : maneuvers demonstrated a negative Sonal's sign [Xray (Date:___)] : [unfilled] Xray -  [Slow Progress] : is progressing slowly [No Sign of Infection] : is showing no signs of infection [Adequate Pain Control] : has adequate pain control [Chills] : no chills [Constipation] : no constipation [Diarrhea] : no diarrhea [Dysuria] : no dysuria [Fever] : no fever [Nausea] : no nausea [Vomiting] : no vomiting [Erythema] : not erythematous [Dehiscence] : not dehisced [de-identified] : POS:  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  Irrigation and debridement of the infected total knee arthroplasty.  One stage revision of the right total knee arthroplasty including femoral and tibial component to hinged total knee arthroplasty with antibiotic impregnated cement.  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  Complex multilayered closure.  Placement of a negative pressure incisional VAC. DOS: 07/23/2024 [de-identified] : 89 y/o F is s/p one stage revision right TKA with an extensor mechanism reconstruction. Pt has been working with PT and was able to try standing with the cast on. Pt's CRP went down the last week, her CRP is now within normal range.  Her ESR remains elevated but is continuing to trend downward. Pt is here to change the cast. Pt comes in wheelchair.    [de-identified] : On exam her incision is healed.  There is no sign of active infection.  There is one section that remains a bit moist but does not show sign of dehiscence or underlying fluid collection.  She is able to fully extend her knee.  She is able to straight leg raise with minimal extensor lag.  We did not assess flexion today as we continue to keep the patient in full extension at all times [de-identified] : right knee 2 vies X-rays taken today show the revision total knee arthroplasty she has significant proximal tibial bone loss.  Her implants are stable she has 2 trabecular metal cones on the tibial side and 1 on the femoral side.  She does have a cerclage cable around the femur for an intraoperative nondisplaced iatrogenic split. [de-identified] : The patient was placed back into a cast.  At this point our extensor mechanism reconstruction appears to remain intact.  She needs to complete her total 6 weeks of IV antibiotics.  I did discuss briefly with Dr. Powell about possibly extending it for another 2 weeks.  It is a bit difficult for the patient to manage the IV antibiotics and another option for us may be to place her on oral antibiotics at the 6-week point.  It appears by her sensitivities that Bactrim would be a reasonable option as long as she can tolerate it.  I am going to defer to the infectious disease doctor up in [place] but I do want the patient on likely a minimum of 3 months of oral antibiotics after the 6 weeks of IV antibiotics is complete.  Ideally if we can find an option for lifelong oral suppressive antibiotics that would be what I would most favor.  Any recurrent infection would pose a severe risk for the patient and may lead to loss of limb.  The patient will see us 3 weeks from today.  At that point we will likely remove her cast and go into a knee immobilizer.  She will remain in full extension for 12 weeks postoperatively.  From my standpoint the patient can discharge from the facility that she currently is in as long as she is able to administer the IV antibiotics on her own.

## 2024-08-13 NOTE — HISTORY OF PRESENT ILLNESS
[___ Weeks Post Op] : [unfilled] weeks post op [3] : the patient reports pain that is 3/10 in severity [Clean/Dry/Intact] : clean, dry and intact [Healed] : healed [Discharge] : noted to have a ~M discharge [Swelling] : swollen [Neuro Intact] : an unremarkable neurological exam [Vascular Intact] : ~T peripheral vascular exam normal [Negative Sonal's] : maneuvers demonstrated a negative Sonal's sign [Xray (Date:___)] : [unfilled] Xray -  [Slow Progress] : is progressing slowly [No Sign of Infection] : is showing no signs of infection [Adequate Pain Control] : has adequate pain control [Chills] : no chills [Constipation] : no constipation [Diarrhea] : no diarrhea [Dysuria] : no dysuria [Fever] : no fever [Nausea] : no nausea [Vomiting] : no vomiting [Erythema] : not erythematous [Dehiscence] : not dehisced [de-identified] : POS:  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  Irrigation and debridement of the infected total knee arthroplasty.  One stage revision of the right total knee arthroplasty including femoral and tibial component to hinged total knee arthroplasty with antibiotic impregnated cement.  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  Complex multilayered closure.  Placement of a negative pressure incisional VAC. DOS: 07/23/2024 [de-identified] : 89 y/o F is s/p one stage revision right TKA with an extensor mechanism reconstruction. Pt has been working with PT and was able to try standing with the cast on. Pt's CRP went down the last week, her CRP is now within normal range.  Her ESR remains elevated but is continuing to trend downward. Pt is here to change the cast. Pt comes in wheelchair.    [de-identified] : right knee 2 vies X-rays taken today show the revision total knee arthroplasty she has significant proximal tibial bone loss.  Her implants are stable she has 2 trabecular metal cones on the tibial side and 1 on the femoral side.  She does have a cerclage cable around the femur for an intraoperative nondisplaced iatrogenic split. [de-identified] : On exam her incision is healed.  There is no sign of active infection.  There is one section that remains a bit moist but does not show sign of dehiscence or underlying fluid collection.  She is able to fully extend her knee.  She is able to straight leg raise with minimal extensor lag.  We did not assess flexion today as we continue to keep the patient in full extension at all times [de-identified] : The patient was placed back into a cast.  At this point our extensor mechanism reconstruction appears to remain intact.  She needs to complete her total 6 weeks of IV antibiotics.  I did discuss briefly with Dr. Powell about possibly extending it for another 2 weeks.  It is a bit difficult for the patient to manage the IV antibiotics and another option for us may be to place her on oral antibiotics at the 6-week point.  It appears by her sensitivities that Bactrim would be a reasonable option as long as she can tolerate it.  I am going to defer to the infectious disease doctor up in [place] but I do want the patient on likely a minimum of 3 months of oral antibiotics after the 6 weeks of IV antibiotics is complete.  Ideally if we can find an option for lifelong oral suppressive antibiotics that would be what I would most favor.  Any recurrent infection would pose a severe risk for the patient and may lead to loss of limb.  The patient will see us 3 weeks from today.  At that point we will likely remove her cast and go into a knee immobilizer.  She will remain in full extension for 12 weeks postoperatively.  From my standpoint the patient can discharge from the facility that she currently is in as long as she is able to administer the IV antibiotics on her own.

## 2024-09-04 ENCOUNTER — APPOINTMENT (OUTPATIENT)
Dept: ORTHOPEDIC SURGERY | Facility: CLINIC | Age: 89
End: 2024-09-04
Payer: MEDICARE

## 2024-09-04 VITALS — HEIGHT: 62 IN | BODY MASS INDEX: 20.24 KG/M2 | WEIGHT: 110 LBS

## 2024-09-04 DIAGNOSIS — Z47.1 AFTERCARE FOLLOWING JOINT REPLACEMENT SURGERY: ICD-10-CM

## 2024-09-04 DIAGNOSIS — Z96.651 AFTERCARE FOLLOWING JOINT REPLACEMENT SURGERY: ICD-10-CM

## 2024-09-04 DIAGNOSIS — S86.811D STRAIN OF OTHER MUSCLE(S) AND TENDON(S) AT LOWER LEG LEVEL, RIGHT LEG, SUBSEQUENT ENCOUNTER: ICD-10-CM

## 2024-09-04 PROCEDURE — 73562 X-RAY EXAM OF KNEE 3: CPT | Mod: RT

## 2024-09-04 PROCEDURE — 99024 POSTOP FOLLOW-UP VISIT: CPT

## 2024-09-04 NOTE — END OF VISIT
[FreeTextEntry4] :  I, Ritchie Beckford, acted solely as a scribe for Dr. Dinh Johnston on 09/04/2024.

## 2024-09-04 NOTE — HISTORY OF PRESENT ILLNESS
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  Irrigation and debridement of the infected total knee arthroplasty.  One stage revision of the right total knee arthroplasty including femoral and tibial component to hinged total knee arthroplasty with antibiotic impregnated cement.  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  Complex multilayered closure.  Placement of a negative pressure incisional VAC. DOS: 07/23/2024. [de-identified] : Patient presents today with  for evaluation of a single-stage revision right knee arthroplasty performed on July 19, 2024.  She presents today in a long-leg cast.  She is been reducing activities.  She is weightbearing on the leg at times.  She recently traveled down from [place] for this appointment.  She denies of any post operative complications since she was last seen. [de-identified] : Right lower extremity exam demonstrates a long-leg cast inclusive of the foot.  Minimal wear of the plantar aspect of the cast is appreciated.  The cast was removed.  The incision site of the anterior right knee is well-healed.  Steri-Strips are noted.  No wound dehiscence.  No drainage or discharge.  No ulcerations are noted of the skin.  Quadriceps atrophy is noted.  No large knee effusions.  Neutral alignment.  Normal temperature.  Sensation grossly intact to light touch.  The patient is able to extend the leg actively without extensor mechanism lag.  No calf tenderness. [de-identified] : Three-view right knee x-rays reviewed today demonstrates stemmed revision hardware.  Neutral alignment of the hardware.  No fractures.  No hardware failure. [de-identified] : Status post single-stage revision knee arthroplasty at just over 6 weeks doing well [de-identified] : The patient at this point is doing well with her revision and extensor mechanism reconstruction.  She has no clinical signs of an extensor lag at this point.  She has full active extension and she can straight leg raise.  She has had trouble tolerating the cast so at this point we are going to go to brace locked in extension and I will reevaluate her in 3 weeks

## 2024-09-07 ENCOUNTER — NON-APPOINTMENT (OUTPATIENT)
Age: 89
End: 2024-09-07

## 2024-09-25 ENCOUNTER — APPOINTMENT (OUTPATIENT)
Dept: ORTHOPEDIC SURGERY | Facility: CLINIC | Age: 89
End: 2024-09-25
Payer: MEDICARE

## 2024-09-25 VITALS
DIASTOLIC BLOOD PRESSURE: 65 MMHG | HEIGHT: 62 IN | HEART RATE: 85 BPM | BODY MASS INDEX: 20.24 KG/M2 | SYSTOLIC BLOOD PRESSURE: 105 MMHG | WEIGHT: 110 LBS

## 2024-09-25 DIAGNOSIS — S86.811D STRAIN OF OTHER MUSCLE(S) AND TENDON(S) AT LOWER LEG LEVEL, RIGHT LEG, SUBSEQUENT ENCOUNTER: ICD-10-CM

## 2024-09-25 DIAGNOSIS — Z47.1 AFTERCARE FOLLOWING JOINT REPLACEMENT SURGERY: ICD-10-CM

## 2024-09-25 DIAGNOSIS — Z96.651 AFTERCARE FOLLOWING JOINT REPLACEMENT SURGERY: ICD-10-CM

## 2024-09-25 PROCEDURE — 99024 POSTOP FOLLOW-UP VISIT: CPT

## 2024-09-25 PROCEDURE — 73562 X-RAY EXAM OF KNEE 3: CPT | Mod: RT

## 2024-09-25 RX ORDER — COLLAGENASE SANTYL 250 [ARB'U]/G
250 OINTMENT TOPICAL
Qty: 1 | Refills: 1 | Status: ACTIVE | COMMUNITY
Start: 2024-09-25 | End: 1900-01-01

## 2024-10-09 ENCOUNTER — APPOINTMENT (OUTPATIENT)
Dept: ORTHOPEDIC SURGERY | Facility: CLINIC | Age: 89
End: 2024-10-09
Payer: MEDICARE

## 2024-10-09 DIAGNOSIS — Z47.1 AFTERCARE FOLLOWING JOINT REPLACEMENT SURGERY: ICD-10-CM

## 2024-10-09 DIAGNOSIS — Z96.651 AFTERCARE FOLLOWING JOINT REPLACEMENT SURGERY: ICD-10-CM

## 2024-10-09 PROCEDURE — 99443: CPT | Mod: 93

## 2024-10-16 ENCOUNTER — APPOINTMENT (OUTPATIENT)
Dept: ORTHOPEDIC SURGERY | Facility: CLINIC | Age: 89
End: 2024-10-16
Payer: MEDICARE

## 2024-10-16 DIAGNOSIS — T84.53XA INFECTION AND INFLAMMATORY REACTION DUE TO INTERNAL RIGHT KNEE PROSTHESIS, INITIAL ENCOUNTER: ICD-10-CM

## 2024-10-16 DIAGNOSIS — S86.811D STRAIN OF OTHER MUSCLE(S) AND TENDON(S) AT LOWER LEG LEVEL, RIGHT LEG, SUBSEQUENT ENCOUNTER: ICD-10-CM

## 2024-10-16 PROCEDURE — 99443: CPT | Mod: 93

## 2024-10-16 RX ADMIN — Medication 975 MILLIGRAM(S): at 16:09

## 2024-10-17 ENCOUNTER — INPATIENT (INPATIENT)
Facility: HOSPITAL | Age: 89
LOS: 11 days | Discharge: NURSING, REHAB OR LONG-TERM CARE | DRG: 464 | End: 2024-10-29
Attending: ORTHOPAEDIC SURGERY | Admitting: ORTHOPAEDIC SURGERY
Payer: MEDICARE

## 2024-10-17 VITALS
TEMPERATURE: 99 F | WEIGHT: 110.01 LBS | SYSTOLIC BLOOD PRESSURE: 105 MMHG | DIASTOLIC BLOOD PRESSURE: 46 MMHG | OXYGEN SATURATION: 97 % | HEART RATE: 111 BPM | HEIGHT: 62 IN | RESPIRATION RATE: 18 BRPM

## 2024-10-17 DIAGNOSIS — T84.59XA INFECTION AND INFLAMMATORY REACTION DUE TO OTHER INTERNAL JOINT PROSTHESIS, INITIAL ENCOUNTER: ICD-10-CM

## 2024-10-17 DIAGNOSIS — Z98.1 ARTHRODESIS STATUS: Chronic | ICD-10-CM

## 2024-10-17 DIAGNOSIS — Z96.642 PRESENCE OF LEFT ARTIFICIAL HIP JOINT: Chronic | ICD-10-CM

## 2024-10-17 DIAGNOSIS — Z98.890 OTHER SPECIFIED POSTPROCEDURAL STATES: Chronic | ICD-10-CM

## 2024-10-17 LAB
ALBUMIN SERPL ELPH-MCNC: 3.5 G/DL — SIGNIFICANT CHANGE UP (ref 3.3–5.2)
ALLERGY+IMMUNOLOGY DIAG STUDY NOTE: SIGNIFICANT CHANGE UP
ALP SERPL-CCNC: 94 U/L — SIGNIFICANT CHANGE UP (ref 40–120)
ALT FLD-CCNC: 94 U/L — HIGH
ANION GAP SERPL CALC-SCNC: 14 MMOL/L — SIGNIFICANT CHANGE UP (ref 5–17)
APTT BLD: 31.8 SEC — SIGNIFICANT CHANGE UP (ref 24.5–35.6)
AST SERPL-CCNC: 78 U/L — HIGH
BASOPHILS # BLD AUTO: 0.02 K/UL — SIGNIFICANT CHANGE UP (ref 0–0.2)
BASOPHILS NFR BLD AUTO: 0.3 % — SIGNIFICANT CHANGE UP (ref 0–2)
BILIRUB SERPL-MCNC: <0.2 MG/DL — LOW (ref 0.4–2)
BLD GP AB SCN SERPL QL: SIGNIFICANT CHANGE UP
BUN SERPL-MCNC: 21.8 MG/DL — HIGH (ref 8–20)
CALCIUM SERPL-MCNC: 9 MG/DL — SIGNIFICANT CHANGE UP (ref 8.4–10.5)
CHLORIDE SERPL-SCNC: 98 MMOL/L — SIGNIFICANT CHANGE UP (ref 96–108)
CO2 SERPL-SCNC: 19 MMOL/L — LOW (ref 22–29)
CREAT SERPL-MCNC: 1.04 MG/DL — SIGNIFICANT CHANGE UP (ref 0.5–1.3)
DAT C3-SP REAG RBC QL: SIGNIFICANT CHANGE UP
DAT IGG-SP REAG RBC-IMP: ABNORMAL
DIR ANTIGLOB POLYSPECIFIC INTERPRETATION: ABNORMAL
EGFR: 52 ML/MIN/1.73M2 — LOW
EOSINOPHIL # BLD AUTO: 0.04 K/UL — SIGNIFICANT CHANGE UP (ref 0–0.5)
EOSINOPHIL NFR BLD AUTO: 0.6 % — SIGNIFICANT CHANGE UP (ref 0–6)
GLUCOSE SERPL-MCNC: 103 MG/DL — HIGH (ref 70–99)
HCT VFR BLD CALC: 28.8 % — LOW (ref 34.5–45)
HGB BLD-MCNC: 9.6 G/DL — LOW (ref 11.5–15.5)
IMM GRANULOCYTES NFR BLD AUTO: 0.3 % — SIGNIFICANT CHANGE UP (ref 0–0.9)
INR BLD: 1.2 RATIO — HIGH (ref 0.85–1.16)
LYMPHOCYTES # BLD AUTO: 0.51 K/UL — LOW (ref 1–3.3)
LYMPHOCYTES # BLD AUTO: 7.8 % — LOW (ref 13–44)
MCHC RBC-ENTMCNC: 32.1 PG — SIGNIFICANT CHANGE UP (ref 27–34)
MCHC RBC-ENTMCNC: 33.3 GM/DL — SIGNIFICANT CHANGE UP (ref 32–36)
MCV RBC AUTO: 96.3 FL — SIGNIFICANT CHANGE UP (ref 80–100)
MONOCYTES # BLD AUTO: 0.75 K/UL — SIGNIFICANT CHANGE UP (ref 0–0.9)
MONOCYTES NFR BLD AUTO: 11.4 % — SIGNIFICANT CHANGE UP (ref 2–14)
MRSA PCR RESULT.: SIGNIFICANT CHANGE UP
NEUTROPHILS # BLD AUTO: 5.24 K/UL — SIGNIFICANT CHANGE UP (ref 1.8–7.4)
NEUTROPHILS NFR BLD AUTO: 79.6 % — HIGH (ref 43–77)
PLATELET # BLD AUTO: 377 K/UL — SIGNIFICANT CHANGE UP (ref 150–400)
POTASSIUM SERPL-MCNC: 5.1 MMOL/L — SIGNIFICANT CHANGE UP (ref 3.5–5.3)
POTASSIUM SERPL-SCNC: 5.1 MMOL/L — SIGNIFICANT CHANGE UP (ref 3.5–5.3)
PROT SERPL-MCNC: 7.6 G/DL — SIGNIFICANT CHANGE UP (ref 6.6–8.7)
PROTHROM AB SERPL-ACNC: 13.9 SEC — HIGH (ref 9.9–13.4)
RBC # BLD: 2.99 M/UL — LOW (ref 3.8–5.2)
RBC # FLD: 14.3 % — SIGNIFICANT CHANGE UP (ref 10.3–14.5)
S AUREUS DNA NOSE QL NAA+PROBE: SIGNIFICANT CHANGE UP
SODIUM SERPL-SCNC: 131 MMOL/L — LOW (ref 135–145)
WBC # BLD: 6.58 K/UL — SIGNIFICANT CHANGE UP (ref 3.8–10.5)
WBC # FLD AUTO: 6.58 K/UL — SIGNIFICANT CHANGE UP (ref 3.8–10.5)

## 2024-10-17 PROCEDURE — 99223 1ST HOSP IP/OBS HIGH 75: CPT

## 2024-10-17 PROCEDURE — 99285 EMERGENCY DEPT VISIT HI MDM: CPT

## 2024-10-17 PROCEDURE — 93970 EXTREMITY STUDY: CPT | Mod: 26

## 2024-10-17 PROCEDURE — 71045 X-RAY EXAM CHEST 1 VIEW: CPT | Mod: 26

## 2024-10-17 PROCEDURE — 86077 PHYS BLOOD BANK SERV XMATCH: CPT

## 2024-10-17 PROCEDURE — 93010 ELECTROCARDIOGRAM REPORT: CPT

## 2024-10-17 RX ORDER — TRANEXAMIC ACID 650 MG/1
1000 TABLET ORAL ONCE
Refills: 0 | Status: DISCONTINUED | OUTPATIENT
Start: 2024-10-18 | End: 2024-10-18

## 2024-10-17 RX ORDER — MEROPENEM 1 G/30ML
1000 INJECTION INTRAVENOUS EVERY 8 HOURS
Refills: 0 | Status: DISCONTINUED | OUTPATIENT
Start: 2024-10-17 | End: 2024-10-18

## 2024-10-17 RX ORDER — SENNA 187 MG
2 TABLET ORAL AT BEDTIME
Refills: 0 | Status: DISCONTINUED | OUTPATIENT
Start: 2024-10-17 | End: 2024-10-18

## 2024-10-17 RX ORDER — MEROPENEM 1 G/30ML
INJECTION INTRAVENOUS
Refills: 0 | Status: DISCONTINUED | OUTPATIENT
Start: 2024-10-17 | End: 2024-10-17

## 2024-10-17 RX ORDER — CHLORHEXIDINE GLUCONATE 40 MG/ML
1 SOLUTION TOPICAL EVERY 12 HOURS
Refills: 0 | Status: COMPLETED | OUTPATIENT
Start: 2024-10-17 | End: 2024-10-18

## 2024-10-17 RX ORDER — SODIUM CHLORIDE 9 MG/ML
1000 INJECTION, SOLUTION INTRAMUSCULAR; INTRAVENOUS; SUBCUTANEOUS
Refills: 0 | Status: DISCONTINUED | OUTPATIENT
Start: 2024-10-17 | End: 2024-10-18

## 2024-10-17 RX ORDER — VALSARTAN 160 MG/1
320 TABLET ORAL DAILY
Refills: 0 | Status: DISCONTINUED | OUTPATIENT
Start: 2024-10-17 | End: 2024-10-18

## 2024-10-17 RX ORDER — CEFEPIME 2 G/1
2000 INJECTION, POWDER, FOR SOLUTION INTRAVENOUS ONCE
Refills: 0 | Status: DISCONTINUED | OUTPATIENT
Start: 2024-10-17 | End: 2024-10-17

## 2024-10-17 RX ORDER — ACETAMINOPHEN 500 MG
975 TABLET ORAL EVERY 8 HOURS
Refills: 0 | Status: DISCONTINUED | OUTPATIENT
Start: 2024-10-17 | End: 2024-10-18

## 2024-10-17 RX ORDER — MIRABEGRON 50 MG/1
25 TABLET, EXTENDED RELEASE ORAL DAILY
Refills: 0 | Status: DISCONTINUED | OUTPATIENT
Start: 2024-10-17 | End: 2024-10-18

## 2024-10-17 RX ORDER — POVIDONE-IODINE 0.07 MG/ML
1 SOLUTION TOPICAL ONCE
Refills: 0 | Status: COMPLETED | OUTPATIENT
Start: 2024-10-17 | End: 2024-10-18

## 2024-10-17 RX ORDER — MUPIROCIN 20 MG/G
1 OINTMENT TOPICAL
Refills: 0 | Status: DISCONTINUED | OUTPATIENT
Start: 2024-10-17 | End: 2024-10-18

## 2024-10-17 RX ORDER — DAPTOMYCIN 500 MG/10ML
INJECTION, POWDER, LYOPHILIZED, FOR SOLUTION INTRAVENOUS
Refills: 0 | Status: DISCONTINUED | OUTPATIENT
Start: 2024-10-17 | End: 2024-10-18

## 2024-10-17 RX ORDER — METOPROLOL TARTRATE 50 MG
50 TABLET ORAL
Refills: 0 | Status: DISCONTINUED | OUTPATIENT
Start: 2024-10-17 | End: 2024-10-18

## 2024-10-17 RX ORDER — CEFAZOLIN SODIUM 1 G
2000 VIAL (EA) INJECTION ONCE
Refills: 0 | Status: DISCONTINUED | OUTPATIENT
Start: 2024-10-18 | End: 2024-10-18

## 2024-10-17 RX ORDER — APREPITANT 40 MG/1
40 CAPSULE ORAL ONCE
Refills: 0 | Status: COMPLETED | OUTPATIENT
Start: 2024-10-18 | End: 2024-10-18

## 2024-10-17 RX ORDER — VANCOMYCIN HYDROCHLORIDE 50 MG/ML
1000 KIT ORAL ONCE
Refills: 0 | Status: DISCONTINUED | OUTPATIENT
Start: 2024-10-18 | End: 2024-10-18

## 2024-10-17 RX ORDER — DAPTOMYCIN 500 MG/10ML
450 INJECTION, POWDER, LYOPHILIZED, FOR SOLUTION INTRAVENOUS ONCE
Refills: 0 | Status: COMPLETED | OUTPATIENT
Start: 2024-10-17 | End: 2024-10-17

## 2024-10-17 RX ORDER — OXYCODONE HYDROCHLORIDE 30 MG/1
5 TABLET ORAL EVERY 6 HOURS
Refills: 0 | Status: DISCONTINUED | OUTPATIENT
Start: 2024-10-17 | End: 2024-10-18

## 2024-10-17 RX ORDER — AMLODIPINE BESYLATE 10 MG
5 TABLET ORAL DAILY
Refills: 0 | Status: DISCONTINUED | OUTPATIENT
Start: 2024-10-17 | End: 2024-10-18

## 2024-10-17 RX ORDER — VENLAFAXINE HCL 150 MG
150 CAPSULE, EXT RELEASE 24 HR ORAL DAILY
Refills: 0 | Status: DISCONTINUED | OUTPATIENT
Start: 2024-10-17 | End: 2024-10-18

## 2024-10-17 RX ORDER — DAPTOMYCIN 500 MG/10ML
450 INJECTION, POWDER, LYOPHILIZED, FOR SOLUTION INTRAVENOUS EVERY 24 HOURS
Refills: 0 | Status: DISCONTINUED | OUTPATIENT
Start: 2024-10-18 | End: 2024-10-18

## 2024-10-17 RX ORDER — VANCOMYCIN HYDROCHLORIDE 50 MG/ML
1000 KIT ORAL ONCE
Refills: 0 | Status: DISCONTINUED | OUTPATIENT
Start: 2024-10-17 | End: 2024-10-17

## 2024-10-17 RX ADMIN — Medication 975 MILLIGRAM(S): at 18:22

## 2024-10-17 RX ADMIN — Medication 50 MILLIGRAM(S): at 20:07

## 2024-10-17 RX ADMIN — CHLORHEXIDINE GLUCONATE 1 APPLICATION(S): 40 SOLUTION TOPICAL at 20:51

## 2024-10-17 RX ADMIN — MEROPENEM 1000 MILLIGRAM(S): 1 INJECTION INTRAVENOUS at 20:51

## 2024-10-17 RX ADMIN — MUPIROCIN 1 APPLICATION(S): 20 OINTMENT TOPICAL at 20:51

## 2024-10-17 RX ADMIN — DAPTOMYCIN 118 MILLIGRAM(S): 500 INJECTION, POWDER, LYOPHILIZED, FOR SOLUTION INTRAVENOUS at 23:18

## 2024-10-17 NOTE — ED ADULT TRIAGE NOTE - CHIEF COMPLAINT QUOTE
pt states she is having right knee surgery tomorrow, was told to come to ED  A&Ox3, resp wnl, brace noted to right knee

## 2024-10-17 NOTE — ED ADULT NURSE NOTE - DOES PATIENT HAVE ADVANCE DIRECTIVE
Subjective   Roxana Brizuela is a 77 y.o. female.   Chief Complaint   Patient presents with   • Difficulty Urinating     PT STATES HAS BEEN GOING ON FOR WKS    • Urinary Frequency     Vitals:    06/26/17 1507   BP: 135/78   Pulse: 83   Resp: 16   Temp: 97.1 °F (36.2 °C)   SpO2: 94%     No LMP recorded (approximate). Patient has had a hysterectomy.    History of Present Illness  Roxana Brizuela 77 y.o.female complains of urinary symptoms. she complains of dysuria, urgency and frequency. She has had symptoms for 3 weeks. The symptoms are moderate.  Patient denies fever, flank pain and gross blood in urine.  Patient has tried  nothing for relief of symptoms.  Patient does not have a history of recurrent UTI. Patient does not have a history of pyelonephritis.           The following portions of the patient's history were reviewed and updated as appropriate: allergies, current medications, past family history, past medical history, past social history, past surgical history and problem list.    Review of Systems   Constitutional: Negative for chills.   HENT: Negative.    Respiratory: Negative for cough and shortness of breath.    Cardiovascular: Negative for chest pain and palpitations.   Genitourinary: Positive for decreased urine volume, dysuria, frequency and urgency. Negative for flank pain, hematuria, menstrual problem, pelvic pain, vaginal bleeding and vaginal discharge.   Musculoskeletal: Positive for back pain.   Neurological: Negative.    Psychiatric/Behavioral: Negative.        Objective   Physical Exam   Constitutional: Vital signs are normal. No distress.   Cardiovascular: Normal rate and regular rhythm.    Pulmonary/Chest: Effort normal and breath sounds normal.   Abdominal: There is tenderness in the suprapubic area. There is no CVA tenderness.   Neurological: She is alert.   Skin: Skin is warm and dry.   Psychiatric: She has a normal mood and affect.       Brief Urine Lab Results  (Last result in the  past 365 days)      Color   Clarity   Blood   Leuk Est   Nitrite   Protein   CREAT   Urine HCG        06/26/17 1515 Yellow Clear Negative Negative Negative Negative               Assessment/Plan   Roxana was seen today for difficulty urinating and urinary frequency.    Diagnoses and all orders for this visit:    Dysuria  -     POC Urinalysis Dipstick, Automated  -     Urine culture (clean catch)    Cystitis  -     Urine culture (clean catch)    Other orders  -     sulfamethoxazole-trimethoprim (BACTRIM DS) 800-160 MG per tablet; Take 1 tablet by mouth 2 (Two) Times a Day.  -     phenazopyridine (PYRIDIUM) 200 MG tablet; Take 1 tablet by mouth 3 (Three) Times a Day As Needed for bladder spasms.    will call with urine culture results   Rest and drink plenty of fluids   Avoid caffeine  Empty your bladder frequently  Follow up if your symptoms persist past 7 days or sooner if your symptoms worsen or if you develop new symptoms  Advised to go to the ER for high fever, severe abdominal pain/low back pain, weakness, or lethargy           No

## 2024-10-17 NOTE — ED PROVIDER NOTE - PHYSICAL EXAMINATION
Vital Signs per nursing documentation  Gen: well appearing, no acute distress  HEENT: NCAT, MMM  Cardiac: regular rate rhythm, normal S1S2  Chest: clear to auscultation bilateral, no wheezes or crackles  Abdomen: soft, non tender non distended  Extremity: right knee with foul smelling discharge  Skin: no rash  Neuro: nonfocal neuro exam

## 2024-10-17 NOTE — ED ADULT NURSE NOTE - OBJECTIVE STATEMENT
pt ref to ED by Dr. Johnston for eval of r knee infection. s/p 2 knee replacements most recently with Dr. Johnston 7/19/24 sent in for infected joint. Foul smelling was on antibx .   for many weeks . Denies fever, chills, n/v, cp, sob, abd pain

## 2024-10-17 NOTE — PROGRESS NOTE ADULT - SUBJECTIVE AND OBJECTIVE BOX
Ortho PA note addendum:     Call received from Infectious disease clinical pharmacist regarding patient's CrCl is out of range and the current dose of Meropenum is to high.   Will delay next 8 hr dose to 12 hrs as recommended and inform ID team in AM.

## 2024-10-17 NOTE — H&P ADULT - HISTORY OF PRESENT ILLNESS
Pt Name: TABITHA MENDENHALL    MRN: [id number]      Patient is a 89y Female presenting to the emergency department with a chief complaint of right knee open wound with drainage.  Patient had a R TKA revision with extensor mechanism reconstruction, incision and drainage on 7/19/24 with Dr Johnston.  Patient was discharged with PICC line on cefepime.  Patient has been seeing her new ID doctor where she lives in [place] who currently has her on bactrim.  Patient states that there has been a small open wound sin e the surgery that has grown in size, it started draining and had a foul odor for the past 10 days.  Patient sent to ED by Dr Johnston.  Patient will be admitted to orthopedics service for surgery tomorrow with Dr Johnston. Denies CP, SOB, calf pain.               Ambulation: Walking independently                   Daily Height in cm: 157.48 (17 Oct 2024 15:14)    Daily

## 2024-10-17 NOTE — H&P ADULT - NSHPPHYSICALEXAM_GEN_ALL_CORE
PHYSICAL EXAM:      Appearance: Alert, responsive, in no acute distress.    Neurological: Sensation is grossly intact to light touch. No focal deficits or weaknesses found.    Skin: right knee -  quarter size open wound anterior knee.  Notable discharge foul smell.  No erythema or warmth noted.     Vascular: 2+ distal pulses. Cap refill < 2 sec. No signs of venous insufficiency or stasis. No extremity ulcerations. No cyanosis.    Musculoskeletal:         Left Upper Extremity: Clavicle: NTTP. Shoulder: NTTP, FROM. Abduction/adduction/flexion/extension intact. Elbow: NTTP, FROM. EE/EF intact. Wrist: NTTP, FROM. WE/WF intact. Hand: NTTP throughout, FROM. Abduction/adduction/flexion/extension of digits 1-5 intact. Compartments soft compressible. Sensation intact to light touch.  Brisk capillary refill, distal pulses +       Right Upper Extremity: Clavicle: NTTP. Shoulder: NTTP, FROM. Abduction/adduction/flexion/extension intact. Elbow: NTTP, FROM. EE/EF intact. Wrist: NTTP, FROM. WE/WF intact. Hand: NTTP throughout, FROM. Abduction/adduction/flexion/extension of digits 1-5 intact. Compartments soft compressible. Sensation intact to light touch. Brisk capillary refill, distal pulses +       Left Lower Extremity: Hip: NTTP, FROM. HF/HE intact. Knee: NTTP, FROM. KE/KF intact. Ankle: NTTP, FROM. DF/PF/EHL/FHL intact. Compartments soft compressible. Sensation intact to light touch.  Brisk capillary refill, distal pulses +       Right Lower Extremity: Hip: NTTP, FROM. HF/HE intact. Knee: NTTP, FROM. KE/KF intact. Ankle: NTTP, FROM. DF/PF/EHL/FHL intact. Compartments soft compressible. Sensation intact to light touch.  Brisk capillary refill, distal pulses +

## 2024-10-17 NOTE — H&P ADULT - NS ATTEND AMEND GEN_ALL_CORE FT
We plan to procedd with an Irrigation and debridement with likely poly exchange and gastrocnemius flap with split thickness skin graft

## 2024-10-17 NOTE — ED PROVIDER NOTE - CLINICAL SUMMARY MEDICAL DECISION MAKING FREE TEXT BOX
89 yof pmh htn here with right knee infection. s/p 2 knee replacements most recently with Dr. Johnston sent in for infected joint. Foul smelling output to knee. in a knee immobilizer. Has been on antibiotics PO for many weeks, most recently on Bactrim. Denies fever, chills, n/v, cp, sob, abd pain  Ap - discussed with ortho, plan to admit, ID consult for abx. OR for washout

## 2024-10-17 NOTE — H&P ADULT - NSHPREVIEWOFSYSTEMS_GEN_ALL_CORE
REVIEW OF SYSTEMS    General: Alert, responsive, in NAD    Skin: No rashes, no pruritis     Musculoskeletal: SEE HPI.    Neurological: No sensory or motor changes.

## 2024-10-17 NOTE — H&P ADULT - ASSESSMENT
PLAN:   * Knee dressed with 4x4's, ABD, Webril and ace.  KI applied  * Bilateral duplex ultrasound ordered   * Admit to orthopedics service   * NPO for OR tomorrow  * IV fluids ordered and to start once NPO  * Pre-operative ABX ordered  * Pre-op labs, EKG, CXR ordered   * Routine daily anticoagulation held for OR  * Medical clearance requested for procedure  * ID consult for ABX management

## 2024-10-17 NOTE — ED ADULT NURSE NOTE - NSFALLHARMRISKINTERV_ED_ALL_ED

## 2024-10-17 NOTE — ED PROVIDER NOTE - OBJECTIVE STATEMENT
89 yof pmh htn here with right knee infection. s/p 2 knee replacements most recently with Dr. Johnston sent in for infected joint. Foul smelling output to knee. in a knee immobilizer. Has been on antibiotics PO for many weeks, most recently on Bactrim. Denies fever, chills, n/v, cp, sob, abd pain

## 2024-10-17 NOTE — H&P ADULT - NSHPLABSRESULTS_GEN_ALL_CORE
Vital Signs Last 24 Hrs  T(C): 37.2 (17 Oct 2024 15:14), Max: 37.2 (17 Oct 2024 15:14)  T(F): 99 (17 Oct 2024 15:14), Max: 99 (17 Oct 2024 15:14)  HR: 111 (17 Oct 2024 15:14) (111 - 111)  BP: 105/46 (17 Oct 2024 15:14) (105/46 - 105/46)  BP(mean): --  RR: 18 (17 Oct 2024 15:14) (18 - 18)  SpO2: 97% (17 Oct 2024 15:14) (97% - 97%)    Parameters below as of 17 Oct 2024 15:14  Patient On (Oxygen Delivery Method): room air

## 2024-10-18 ENCOUNTER — TRANSCRIPTION ENCOUNTER (OUTPATIENT)
Age: 89
End: 2024-10-18

## 2024-10-18 LAB
ANION GAP SERPL CALC-SCNC: 14 MMOL/L — SIGNIFICANT CHANGE UP (ref 5–17)
ANION GAP SERPL CALC-SCNC: 16 MMOL/L — SIGNIFICANT CHANGE UP (ref 5–17)
ANTIBODY INTERPRETATION 4: SIGNIFICANT CHANGE UP
BUN SERPL-MCNC: 13.4 MG/DL — SIGNIFICANT CHANGE UP (ref 8–20)
BUN SERPL-MCNC: 16.6 MG/DL — SIGNIFICANT CHANGE UP (ref 8–20)
CALCIUM SERPL-MCNC: 8.8 MG/DL — SIGNIFICANT CHANGE UP (ref 8.4–10.5)
CALCIUM SERPL-MCNC: 8.9 MG/DL — SIGNIFICANT CHANGE UP (ref 8.4–10.5)
CHLORIDE SERPL-SCNC: 98 MMOL/L — SIGNIFICANT CHANGE UP (ref 96–108)
CHLORIDE SERPL-SCNC: 98 MMOL/L — SIGNIFICANT CHANGE UP (ref 96–108)
CK SERPL-CCNC: 116 U/L — SIGNIFICANT CHANGE UP (ref 25–170)
CO2 SERPL-SCNC: 15 MMOL/L — LOW (ref 22–29)
CO2 SERPL-SCNC: 16 MMOL/L — LOW (ref 22–29)
CREAT SERPL-MCNC: 0.67 MG/DL — SIGNIFICANT CHANGE UP (ref 0.5–1.3)
CREAT SERPL-MCNC: 0.7 MG/DL — SIGNIFICANT CHANGE UP (ref 0.5–1.3)
EGFR: 83 ML/MIN/1.73M2 — SIGNIFICANT CHANGE UP
EGFR: 84 ML/MIN/1.73M2 — SIGNIFICANT CHANGE UP
GLUCOSE SERPL-MCNC: 108 MG/DL — HIGH (ref 70–99)
GLUCOSE SERPL-MCNC: 85 MG/DL — SIGNIFICANT CHANGE UP (ref 70–99)
HCT VFR BLD CALC: 26.3 % — LOW (ref 34.5–45)
HGB BLD-MCNC: 8.7 G/DL — LOW (ref 11.5–15.5)
MAGNESIUM SERPL-MCNC: 1.8 MG/DL — SIGNIFICANT CHANGE UP (ref 1.6–2.6)
MCHC RBC-ENTMCNC: 32 PG — SIGNIFICANT CHANGE UP (ref 27–34)
MCHC RBC-ENTMCNC: 33.1 GM/DL — SIGNIFICANT CHANGE UP (ref 32–36)
MCV RBC AUTO: 96.7 FL — SIGNIFICANT CHANGE UP (ref 80–100)
PHOSPHATE SERPL-MCNC: 3 MG/DL — SIGNIFICANT CHANGE UP (ref 2.4–4.7)
PLATELET # BLD AUTO: 384 K/UL — SIGNIFICANT CHANGE UP (ref 150–400)
POTASSIUM SERPL-MCNC: 4.8 MMOL/L — SIGNIFICANT CHANGE UP (ref 3.5–5.3)
POTASSIUM SERPL-MCNC: 5 MMOL/L — SIGNIFICANT CHANGE UP (ref 3.5–5.3)
POTASSIUM SERPL-SCNC: 4.8 MMOL/L — SIGNIFICANT CHANGE UP (ref 3.5–5.3)
POTASSIUM SERPL-SCNC: 5 MMOL/L — SIGNIFICANT CHANGE UP (ref 3.5–5.3)
RBC # BLD: 2.72 M/UL — LOW (ref 3.8–5.2)
RBC # FLD: 14.2 % — SIGNIFICANT CHANGE UP (ref 10.3–14.5)
SODIUM SERPL-SCNC: 126 MMOL/L — LOW (ref 135–145)
SODIUM SERPL-SCNC: 129 MMOL/L — LOW (ref 135–145)
WBC # BLD: 9.37 K/UL — SIGNIFICANT CHANGE UP (ref 3.8–10.5)
WBC # FLD AUTO: 9.37 K/UL — SIGNIFICANT CHANGE UP (ref 3.8–10.5)

## 2024-10-18 PROCEDURE — 99223 1ST HOSP IP/OBS HIGH 75: CPT

## 2024-10-18 PROCEDURE — 93010 ELECTROCARDIOGRAM REPORT: CPT

## 2024-10-18 PROCEDURE — 27486 REVISE/REPLACE KNEE JOINT: CPT | Mod: RT

## 2024-10-18 PROCEDURE — 27486 REVISE/REPLACE KNEE JOINT: CPT | Mod: AS,RT

## 2024-10-18 PROCEDURE — 73562 X-RAY EXAM OF KNEE 3: CPT | Mod: 26,RT

## 2024-10-18 PROCEDURE — 99232 SBSQ HOSP IP/OBS MODERATE 35: CPT

## 2024-10-18 DEVICE — GRAFT BONE OSTEOBOOST SELECT 10CC STRL
Type: IMPLANTABLE DEVICE | Site: RIGHT | Status: NON-FUNCTIONAL
Removed: 2024-10-18

## 2024-10-18 DEVICE — IMPLANTABLE DEVICE
Type: IMPLANTABLE DEVICE | Site: RIGHT | Status: NON-FUNCTIONAL
Removed: 2024-10-18

## 2024-10-18 RX ORDER — VANCOMYCIN HYDROCHLORIDE 50 MG/ML
750 KIT ORAL EVERY 24 HOURS
Refills: 0 | Status: DISCONTINUED | OUTPATIENT
Start: 2024-10-18 | End: 2024-10-18

## 2024-10-18 RX ORDER — AMLODIPINE BESYLATE 10 MG
5 TABLET ORAL DAILY
Refills: 0 | Status: DISCONTINUED | OUTPATIENT
Start: 2024-10-18 | End: 2024-10-29

## 2024-10-18 RX ORDER — METOPROLOL TARTRATE 50 MG
50 TABLET ORAL DAILY
Refills: 0 | Status: DISCONTINUED | OUTPATIENT
Start: 2024-10-18 | End: 2024-10-18

## 2024-10-18 RX ORDER — VALSARTAN 160 MG/1
320 TABLET ORAL DAILY
Refills: 0 | Status: DISCONTINUED | OUTPATIENT
Start: 2024-10-18 | End: 2024-10-19

## 2024-10-18 RX ORDER — METOPROLOL TARTRATE 50 MG
50 TABLET ORAL DAILY
Refills: 0 | Status: DISCONTINUED | OUTPATIENT
Start: 2024-10-18 | End: 2024-10-29

## 2024-10-18 RX ORDER — VANCOMYCIN HYDROCHLORIDE 50 MG/ML
750 KIT ORAL EVERY 24 HOURS
Refills: 0 | Status: DISCONTINUED | OUTPATIENT
Start: 2024-10-19 | End: 2024-10-21

## 2024-10-18 RX ORDER — CEFAZOLIN SODIUM 1 G
2000 VIAL (EA) INJECTION
Refills: 0 | Status: COMPLETED | OUTPATIENT
Start: 2024-10-18 | End: 2024-10-19

## 2024-10-18 RX ORDER — VANCOMYCIN HYDROCHLORIDE 50 MG/ML
750 KIT ORAL EVERY 24 HOURS
Refills: 0 | Status: CANCELLED | OUTPATIENT
Start: 2024-10-19 | End: 2024-10-18

## 2024-10-18 RX ORDER — PANTOPRAZOLE SODIUM 40 MG/1
40 TABLET, DELAYED RELEASE ORAL
Refills: 0 | Status: DISCONTINUED | OUTPATIENT
Start: 2024-10-18 | End: 2024-10-29

## 2024-10-18 RX ORDER — HYDROMORPHONE HCL/0.9% NACL/PF 6 MG/30 ML
4 PATIENT CONTROLLED ANALGESIA SYRINGE INTRAVENOUS
Refills: 0 | Status: DISCONTINUED | OUTPATIENT
Start: 2024-10-18 | End: 2024-10-21

## 2024-10-18 RX ORDER — ACETAMINOPHEN 500 MG
975 TABLET ORAL EVERY 8 HOURS
Refills: 0 | Status: DISCONTINUED | OUTPATIENT
Start: 2024-10-18 | End: 2024-10-29

## 2024-10-18 RX ORDER — VANCOMYCIN HYDROCHLORIDE 50 MG/ML
750 KIT ORAL EVERY 12 HOURS
Refills: 0 | Status: DISCONTINUED | OUTPATIENT
Start: 2024-10-18 | End: 2024-10-18

## 2024-10-18 RX ORDER — FENTANYL CITRAT/DEXTROSE 5%/PF 1250MCG/50
50 PATIENT CONTROLLED ANALGESIA SYRINGE INTRAVENOUS
Refills: 0 | Status: DISCONTINUED | OUTPATIENT
Start: 2024-10-18 | End: 2024-10-18

## 2024-10-18 RX ORDER — ACETAMINOPHEN 500 MG
1000 TABLET ORAL ONCE
Refills: 0 | Status: COMPLETED | OUTPATIENT
Start: 2024-10-18 | End: 2024-10-18

## 2024-10-18 RX ORDER — APIXABAN 5 MG/1
2.5 TABLET, FILM COATED ORAL
Refills: 0 | Status: DISCONTINUED | OUTPATIENT
Start: 2024-10-19 | End: 2024-10-29

## 2024-10-18 RX ORDER — METOPROLOL TARTRATE 50 MG
1 TABLET ORAL
Refills: 0 | DISCHARGE

## 2024-10-18 RX ORDER — OXYCODONE HYDROCHLORIDE 30 MG/1
10 TABLET ORAL
Refills: 0 | Status: DISCONTINUED | OUTPATIENT
Start: 2024-10-18 | End: 2024-10-18

## 2024-10-18 RX ORDER — MAGNESIUM HYDROXIDE 1200 MG/15ML
30 SUSPENSION ORAL DAILY
Refills: 0 | Status: DISCONTINUED | OUTPATIENT
Start: 2024-10-18 | End: 2024-10-29

## 2024-10-18 RX ORDER — FENTANYL CITRAT/DEXTROSE 5%/PF 1250MCG/50
25 PATIENT CONTROLLED ANALGESIA SYRINGE INTRAVENOUS
Refills: 0 | Status: DISCONTINUED | OUTPATIENT
Start: 2024-10-18 | End: 2024-10-18

## 2024-10-18 RX ORDER — VENLAFAXINE HCL 150 MG
150 CAPSULE, EXT RELEASE 24 HR ORAL DAILY
Refills: 0 | Status: DISCONTINUED | OUTPATIENT
Start: 2024-10-18 | End: 2024-10-24

## 2024-10-18 RX ORDER — ONDANSETRON HYDROCHLORIDE 2 MG/ML
4 INJECTION, SOLUTION INTRAMUSCULAR; INTRAVENOUS EVERY 6 HOURS
Refills: 0 | Status: DISCONTINUED | OUTPATIENT
Start: 2024-10-18 | End: 2024-10-29

## 2024-10-18 RX ORDER — FIDAXOMICIN 200 MG/1
1 TABLET, FILM COATED ORAL
Refills: 0 | DISCHARGE

## 2024-10-18 RX ORDER — SENNA 187 MG
2 TABLET ORAL AT BEDTIME
Refills: 0 | Status: DISCONTINUED | OUTPATIENT
Start: 2024-10-18 | End: 2024-10-29

## 2024-10-18 RX ORDER — SODIUM CHLORIDE 9 MG/ML
1000 INJECTION, SOLUTION INTRAMUSCULAR; INTRAVENOUS; SUBCUTANEOUS
Refills: 0 | Status: DISCONTINUED | OUTPATIENT
Start: 2024-10-18 | End: 2024-10-19

## 2024-10-18 RX ORDER — INFLUENZ VIR VAC TV P-SURF2003 15MCG/.5ML
0.5 SYRINGE (ML) INTRAMUSCULAR ONCE
Refills: 0 | Status: DISCONTINUED | OUTPATIENT
Start: 2024-10-18 | End: 2024-10-29

## 2024-10-18 RX ORDER — POLYETHYLENE GLYCOL 3350 17 G/17G
17 POWDER, FOR SOLUTION ORAL AT BEDTIME
Refills: 0 | Status: DISCONTINUED | OUTPATIENT
Start: 2024-10-18 | End: 2024-10-29

## 2024-10-18 RX ORDER — OXYCODONE HYDROCHLORIDE 30 MG/1
5 TABLET ORAL
Refills: 0 | Status: DISCONTINUED | OUTPATIENT
Start: 2024-10-18 | End: 2024-10-20

## 2024-10-18 RX ORDER — MEROPENEM 1 G/30ML
1000 INJECTION INTRAVENOUS EVERY 12 HOURS
Refills: 0 | Status: DISCONTINUED | OUTPATIENT
Start: 2024-10-18 | End: 2024-10-21

## 2024-10-18 RX ADMIN — Medication 4 MILLIGRAM(S): at 20:47

## 2024-10-18 RX ADMIN — Medication 2000 MILLIGRAM(S): at 21:18

## 2024-10-18 RX ADMIN — MUPIROCIN 1 APPLICATION(S): 20 OINTMENT TOPICAL at 05:25

## 2024-10-18 RX ADMIN — Medication 50 MILLIGRAM(S): at 05:26

## 2024-10-18 RX ADMIN — VALSARTAN 320 MILLIGRAM(S): 160 TABLET ORAL at 05:26

## 2024-10-18 RX ADMIN — Medication 400 MILLIGRAM(S): at 19:57

## 2024-10-18 RX ADMIN — POVIDONE-IODINE 1 APPLICATION(S): 0.07 SOLUTION TOPICAL at 12:35

## 2024-10-18 RX ADMIN — Medication 4 MILLIGRAM(S): at 21:47

## 2024-10-18 RX ADMIN — Medication 975 MILLIGRAM(S): at 05:27

## 2024-10-18 RX ADMIN — APREPITANT 40 MILLIGRAM(S): 40 CAPSULE ORAL at 14:04

## 2024-10-18 RX ADMIN — Medication 5 MILLIGRAM(S): at 05:26

## 2024-10-18 RX ADMIN — CHLORHEXIDINE GLUCONATE 1 APPLICATION(S): 40 SOLUTION TOPICAL at 05:25

## 2024-10-18 NOTE — CONSULT NOTE ADULT - ASSESSMENT
89y Female presenting to the emergency department with a chief complaint of right knee open wound with drainage. PAtient admitted under orthopedics service planned to go to OR tomorrow am for washout.     Infected Right TKA  -Admitted to Ortho service for surgical infection/plan for washout   -OR planned for AM  -Keep NPO, bedrest  -EKG sinus tachy 109, no acute ST abnormalities  -CXR without acute chest disease  -PITTMAN 0.2% SAL risk  -RCRI 0; Class I risk  -Patient is optimized for the moderate risk procedure without modifiable risk factors     HTN/HLD  -Continue Amlodipine 5 mg daily  -Continue Metoprolol 50 mg BID  -Continue Lasix 20 mg daily  -Continue Valsartan 320 mg daily  -Continue Atorvastatin 20 mg qhs    Hx of Left Femoral Vein DVT  -Eliquis on hold for OR  -Repeat LE doppler ordered to determine if clot still present    Depression  -Continue Venlafaxine 37.5 mg daily     89y Female presenting to the emergency department with a chief complaint of right knee open wound with drainage. PAtient admitted under orthopedics service planned to go to OR tomorrow am for washout.     Infected Right TKA  -Admitted to Ortho service for surgical infection/plan for washout   -OR planned for AM  -Keep NPO, bedrest  -EKG sinus tachy 109, no acute ST abnormalities  -CXR without acute chest disease  -PITTMAN 0.2% SAL risk  -RCRI 0; Class I risk  -Patient is optimized for the moderate risk procedure without modifiable risk factors     HTN/HLD  -Continue Amlodipine 5 mg daily  -Continue Metoprolol 50 mg BID  -Continue Valsartan 320 mg daily  -Patient reports statin was recently d/jose by her doctor  -Continue Valsartan 320 mg daily  -Continue Atorvastatin 20 mg qhs    Hx of Left Femoral Vein DVT  -Eliquis on hold for OR  -LE doppler negative for DVT    Depression  -Continue Venlafaxine  mg daily    Hx of C. Diff  -Continue with Dificid taper, currently 200 mg every other day

## 2024-10-18 NOTE — PROGRESS NOTE ADULT - SUBJECTIVE AND OBJECTIVE BOX
see dictated consult.      Pt with a complex hx of right total knee infection with exposed mesh quad tendon graft.  Plan for debridement by Dr. Johnston.  Plastic surgery consulted for assistance with closure, likely need for muscle flap and skin graft due to exposed necrotic tissue and skin loss.  Discussed surgical plan with the pt and her .  All questions answered.  DIscussed persistent infection, graft loss, flap loss, need for additional surgery possibly amputation.  They understand and agree to proceed.

## 2024-10-18 NOTE — CONSULT NOTE ADULT - ASSESSMENT
89-year-old female underwent right total knee arthroplasty in  August 2022 at a facility in [place].  After the replacement patient had pain in the right knee.  She was seen by orthopedics during this time.  She lives between [place], [place], [place] and comes to [facility] during some summer months.  She was seen by her orthopedic surgeon in [place], [place] Dr. Garza and underwent revision in December 2023 at which time per the patient the orthopedist (Dr. Garza) did not notice anything abnormal about her knee replacement.  Patient again had another revision in [place] in February 2024.  Postoperatively since February patient was hospitalized in [place] and found to have MSSA bacteremia and was seen by infectious diseases in that hospitalization, underwent IV antibiotic treatment with cefazolin and Rifampin with anticipated end date of April 22, 2024.  She was discharged to a rehab facility and readmitted from the rehab for unclear reasons.  From some documentation the patient has patient had a drug rash because of rifampin and that was discontinued.  Cefazolin was switched to daptomycin.  Patient had another revision of the knee on March 27, 2024.  And was treated with another 6 weeks of antibiotics this time daptomycin which was completed on May 9.  Patient also had multiple echocardiograms last one on May 6 without any evidence of endocarditis.  After completion of intravenous antibiotics patient was placed on Bactrim for 1 month.  She left [place] and went to [place].  She was seen by her infectious disease physician Dr Lila Alvarado in [place], [place]. She followed up here in [place] with Dr Johnston on June 24, 2024 and Antibiotics was discontinued. Patient underwent right knee joint aspiration July 1, 2024 with 11k nucleated cell. Patient had another right knee aspiration on July 10 which the specimen was clotted and was not analyzed by the Lab. Patient was brought to the ER on July 17, 2024 and underwent another arthrocentesis with a cell fluid count of 32, 580 nucleated cells.  Was taken to the OR for Septic PJI Rt knee infection and s/p right TKA 7/19/24, Joint fluid cultures reporting Serratia marcescens, Joint fluid PCR reporting Serratia marcescens and was planned to be on Cefepime till 8/29/24, was discharged July 24, 2024 to a [facility] in [place] and followed with ID physician Dr Lila Alvarado in [place]. Patient was on Bactrim after the IV antibiotics managed by her ID doctor. Patient states that there has been a small open wound sin e the surgery that has grown in size, it started draining and had a foul odor for the past 10 days prior to presentation.  Patient sent to ED by Dr Johnston on 10/17.  Patient was be admitted to orthopedics service for surgery. Started on Meropenem and Daptomycin.      PJI  Surgical incision site wound      - Obtain OR cultures   - d/c Daptomycin   - after OR start Vancomycin and Meropenem  - Hold off on PICC/Midline for now unless needed for reasons other than infectious diseases  - Follow up cultures  - Trend Fever  - Trend WBC      Thank you for allowing me to participate in the care of your patient.   Will Follow    Discussed treatment plan with: Dr Johnston

## 2024-10-18 NOTE — PATIENT PROFILE ADULT - FALL HARM RISK - HARM RISK INTERVENTIONS

## 2024-10-18 NOTE — BRIEF OPERATIVE NOTE - NSICDXBRIEFPROCEDURE_GEN_ALL_CORE_FT
PROCEDURES:  Closure using gastrocnemius flap 18-Oct-2024 18:13:55  Peng Bass  Composite skin grafting 18-Oct-2024 18:14:24  Peng Bass  
PROCEDURES:  Revision total knee arthroplasty 18-Oct-2024 16:07:02  Dinh Johnston

## 2024-10-18 NOTE — PROGRESS NOTE ADULT - ASSESSMENT
89y Female presenting to the emergency department with a chief complaint of right knee open wound with drainage. PAtient admitted under orthopedics service planned to go to OR tomorrow am for washout.     Infected Right TKA  -Admitted to Ortho service for surgical infection/plan for washout   -OR planned for AM  -Keep NPO, bedrest  -EKG sinus tachy 109, no acute ST abnormalities  -CXR without acute chest disease  -PITTMAN 0.2% SAL risk  -RCRI 0; Class I risk  -Patient is optimized for the moderate risk procedure without modifiable risk factors   -patient is on Daptomycine   would send cultures and call ID consult     HTN/HLD  -Continue Amlodipine 5 mg daily  -Continue Metoprolol 50 mg BID  -Continue Valsartan 320 mg daily  will continue BP meds with holding parameters   -Patient reports statin was recently d/jose by her doctor  -Continue Atorvastatin 20 mg qhs    Hx of Left Femoral Vein DVT  -Eliquis on hold for OR  -LE doppler negative for DVT  -would resume anticoagulation after surgery    Depression  -Continue Venlafaxine  mg daily    Hx of C. Diff  -Continue with Dificid taper, currently 200 mg every other day.     chronic Anemia: her Hb is ~9, will continue to monitor CBC.     Hyponatremia: seems chronic, will continue to monitor BMP, if trending down will do fluid restriction and salt tabs as she looks euvolemic.

## 2024-10-18 NOTE — BRIEF OPERATIVE NOTE - OPERATION/FINDINGS
see dictated report
I+D with polyethylene exchange, patellectomy, gastrocnemius flap (dr. Soriano), complex wound closure

## 2024-10-18 NOTE — CONSULT NOTE ADULT - SUBJECTIVE AND OBJECTIVE BOX
[facility]  INFECTIOUS DISEASES at [place] / [place] / [place]  =======================================================                              Ady Zheng MD                              Professor Emeritus:  Dr Terrence Gong MD            Diplomates American Board of Internal Medicine & Infectious Diseases                                   Tel  [phone] Fax [phone]                                  Hospital Consult line:  [phone]  =======================================================      [id number]  [facility]    CC: Patient is a 89y old  Female who presents with a chief complaint of  Rt knee small open wound     89-year-old female underwent right total knee arthroplasty in  August 2022 at a facility in [place].  After the replacement patient had pain in the right knee.  She was seen by orthopedics during this time.  She lives between [place], [place], [place] and comes to [facility] during some summer months.  She was seen by her orthopedic surgeon in [place], [place] Dr. Garza and underwent revision in December 2023 at which time per the patient the orthopedist (Dr. Garza) did not notice anything abnormal about her knee replacement.  Patient again had another revision in [place] in February 2024.  Postoperatively since February patient was hospitalized in [place] and found to have MSSA bacteremia and was seen by infectious diseases in that hospitalization, underwent IV antibiotic treatment with cefazolin and Rifampin with anticipated end date of April 22, 2024.  She was discharged to a rehab facility and readmitted from the rehab for unclear reasons.  From some documentation the patient has patient had a drug rash because of rifampin and that was discontinued.  Cefazolin was switched to daptomycin.  Patient had another revision of the knee on March 27, 2024.  And was treated with another 6 weeks of antibiotics this time daptomycin which was completed on May 9.  Patient also had multiple echocardiograms last one on May 6 without any evidence of endocarditis.  After completion of intravenous antibiotics patient was placed on Bactrim for 1 month.  She left [place] and went to [place].  She was seen by her infectious disease physician Dr Lila Alvarado in [place], [place]. She followed up here in [place] with Dr Johnston on June 24, 2024 and Antibiotics was discontinued. Patient underwent right knee joint aspiration July 1, 2024 with 11k nucleated cell. Patient had another right knee aspiration on July 10 which the specimen was clotted and was not analyzed by the Lab. Patient was brought to the ER on July 17, 2024 and underwent another arthrocentesis with a cell fluid count of 32, 580 nucleated cells.  Was taken to the OR for Septic PJI Rt knee infection and s/p right TKA 7/19/24, Joint fluid cultures reporting Serratia marcescens, Joint fluid PCR reporting Serratia marcescens and was planned to be on Cefepime till 8/29/24, was discharged July 24, 2024 to a [facility] in [place] and followed with ID physician Dr Lila Alvarado in [place]. Patient was on Bactrim after the IV antibiotics managed by her ID doctor. Patient states that there has been a small open wound since the surgery that has grown in size, it started draining and had a foul odor for the past 10 days prior to presentation.  Patient sent to ED by Dr Johnston on 10/17.  Patient was be admitted to orthopedics service for surgery. Started on Meropenem and Daptomycin. ID input requested.       Past Medical & Surgical Hx:  HTN (hypertension)  HLD (hyperlipidemia)  History of total hip replacement, left  S/P arthroscopy of shoulder  bilateral  S/P spinal fusion      Social Hx:  Denies smoking      FAMILY HISTORY:  No pertinent family history in first degree relatives      Allergies  Demerol (Unknown)  fluoroquinolone antibiotics (Other)       REVIEW OF SYSTEMS:  CONSTITUTIONAL:  No Fever or chills  HEENT:  No diplopia or blurred vision.  No earache, sore throat or runny nose.  CARDIOVASCULAR:  No chest pain  RESPIRATORY:  No cough, shortness of breath  GASTROINTESTINAL:  No nausea, vomiting or diarrhea.  GENITOURINARY:  No dysuria, frequency or urgency. No Blood in urine  MUSCULOSKELETAL:  no joint aches, no muscle pain  SKIN:  No change in skin, hair or nails.  NEUROLOGIC:  No Headaches      Physical Exam:  GEN: NAD  HEENT: normocephalic and atraumatic. EOMI. PERRL.    NECK: Supple.   LUNGS: CTA B/L.  HEART: RRR  ABDOMEN: Soft, NT, ND.  +BS.    : No CVA tenderness  EXTREMITIES: Without  edema.  MSK: No joint swelling  NEUROLOGIC: No Focal Deficits   SKIN: No rash      Height (cm): 157.5 (10-18 @ 09:16)  Weight (kg): 49.9 (10-18 @ 09:16)  BMI (kg/m2): 20.1 (10-18 @ 09:16)  BSA (m2): 1.48 (10-18 @ 09:16)    Vitals:  T(F): 97.1 (18 Oct 2024 09:16), Max: 99.4 (18 Oct 2024 04:28)  HR: 92 (18 Oct 2024 09:16)  BP: 135/59 (18 Oct 2024 09:16)  RR: 16 (18 Oct 2024 09:16)  SpO2: 100% (18 Oct 2024 09:16) (92% - 100%)  temp max in last 48H T(F): , Max: 99.4 (10-18-24 @ 04:28)    Current Antibiotics:  ceFAZolin  Injectable. 2000 milliGRAM(s) IV Push once  DAPTOmycin IVPB      DAPTOmycin IVPB 450 milliGRAM(s) IV Intermittent every 24 hours  meropenem Injectable 1000 milliGRAM(s) IV Push every 8 hours  vancomycin  IVPB 1000 milliGRAM(s) IV Intermittent once    Other medications:  amLODIPine   Tablet 5 milliGRAM(s) Oral daily  aprepitant 40 milliGRAM(s) Oral once  influenza  Vaccine (HIGH DOSE) 0.5 milliLiter(s) IntraMuscular once  metoprolol succinate ER 50 milliGRAM(s) Oral daily  mirabegron ER 25 milliGRAM(s) Oral daily  mupirocin 2% Ointment 1 Application(s) Both Nostrils two times a day  senna 2 Tablet(s) Oral at bedtime  sodium chloride 0.9%. 1000 milliLiter(s) IV Continuous <Continuous>  tranexamic acid IVPB 1000 milliGRAM(s) IV Intermittent once  valsartan 320 milliGRAM(s) Oral daily  venlafaxine XR. 150 milliGRAM(s) Oral daily                            9.6    6.58  )-----------( 377      ( 17 Oct 2024 18:11 )             28.8     10-18    126[L]  |  98  |  16.6  ----------------------------<  85  4.8   |  15.0[L]  |  0.70    Ca    8.8      18 Oct 2024 05:27    TPro  7.6  /  Alb  3.5  /  TBili  <0.2[L]  /  DBili  x   /  AST  78[H]  /  ALT  94[H]  /  AlkPhos  94  10-17    RECENT CULTURES:      WBC Count: 6.58 K/uL (10-17-24 @ 18:11)    Creatinine: 0.70 mg/dL (10-18-24 @ 05:27)  Creatinine: 1.04 mg/dL (10-17-24 @ 18:11)

## 2024-10-18 NOTE — PROGRESS NOTE ADULT - SUBJECTIVE AND OBJECTIVE BOX
Ortho Post Op Check    Name: TABITHA MENDENHALL    MR #: [id number]    Procedure: Right knee I&D w Gastroc flap skin closure w plastic surgery   Surgeon: Dr Johnston and Dr Soriano    Pt comfortable without complaints, pain controlled  Denies CP, SOB, N/V, numbness/tingling     General Exam:  Vital Signs Last 24 Hrs  T(C): 37.3 (10-18-24 @ 18:03), Max: 37.3 (10-18-24 @ 18:03)  T(F): 99.2 (10-18-24 @ 18:03), Max: 99.2 (10-18-24 @ 18:03)  HR: 108 (10-18-24 @ 18:18) (101 - 108)  BP: 123/64 (10-18-24 @ 18:13) (123/64 - 163/77)  BP(mean): --  RR: 19 (10-18-24 @ 18:18) (18 - 20)  SpO2: 98% (10-18-24 @ 18:18) (96% - 98%)    General: Pt Alert and oriented, NAD, controlled pain.  Right leg KI in place. Ace wrap Dressings C/D/I. No bleeding.  Pulses: 2+ dorsalis pedis pulse. Cap refill < 2 sec.  Sensation: Grossly intact to light touch without deficit.  Motor: + EHL/FHL/TA/GS  Drain x 3 appear to be functioning well and under suction           MEDICATIONS  (STANDING):  acetaminophen     Tablet .. 975 milliGRAM(s) Oral every 8 hours  acetaminophen   IVPB .. 1000 milliGRAM(s) IV Intermittent once  amLODIPine   Tablet 5 milliGRAM(s) Oral daily  ceFAZolin  Injectable. 2000 milliGRAM(s) IV Push <User Schedule>  influenza  Vaccine (HIGH DOSE) 0.5 milliLiter(s) IntraMuscular once  lactated ringers. 1000 milliLiter(s) (75 mL/Hr) IV Continuous <Continuous>  meropenem Injectable 1000 milliGRAM(s) IV Push every 12 hours  metoprolol succinate ER 50 milliGRAM(s) Oral daily  pantoprazole    Tablet 40 milliGRAM(s) Oral before breakfast  polyethylene glycol 3350 17 Gram(s) Oral at bedtime  senna 2 Tablet(s) Oral at bedtime  sodium chloride 0.9%. 1000 milliLiter(s) (85 mL/Hr) IV Continuous <Continuous>  valsartan 320 milliGRAM(s) Oral daily  vancomycin  IVPB 750 milliGRAM(s) IV Intermittent every 24 hours  venlafaxine XR. 150 milliGRAM(s) Oral daily    MEDICATIONS  (PRN):  fentaNYL    Injectable 25 MICROGram(s) IV Push every 5 minutes PRN Moderate Pain (4 - 6)  fentaNYL    Injectable 50 MICROGram(s) IV Push every 5 minutes PRN Severe Pain (7 - 10)  HYDROmorphone   Tablet 4 milliGRAM(s) Oral every 3 hours PRN Severe Pain (7 - 10)  magnesium hydroxide Suspension 30 milliLiter(s) Oral daily PRN Constipation  ondansetron Injectable 4 milliGRAM(s) IV Push every 6 hours PRN Nausea and/or Vomiting  oxyCODONE    IR 5 milliGRAM(s) Oral every 3 hours PRN Mild Pain (1 - 3)  oxyCODONE    IR 10 milliGRAM(s) Oral every 3 hours PRN Moderate Pain (4 - 6)            A/P: 89yFemale POD#0 s/p Right total knee arthroplasty  I&D w poly swap and gastroc flap w plastic surgery closure   - Stable  - Pain Control  - DVT ppx: Eliquis   - Post op abx: as per ID- Vanco, Meropenum, Ancef  - PT eval pending  - Weight bearing status: NWB Right LE  - KI AAT  - Drain x 3   - F/U labs- Hyponatremia, Anemia- will discuss w medical team

## 2024-10-18 NOTE — BRIEF OPERATIVE NOTE - NSICDXBRIEFPREOP_GEN_ALL_CORE_FT
PRE-OP DIAGNOSIS:  Infection of total knee replacement 18-Oct-2024 16:07:19  Dinh Johnston  
PRE-OP DIAGNOSIS:  Infection of total knee replacement 18-Oct-2024 16:07:19  Dinh Johnston

## 2024-10-18 NOTE — CONSULT NOTE ADULT - SUBJECTIVE AND OBJECTIVE BOX
CC: Knee wound  HPI:  MRN: [id number]    Patient is a 89y Female presenting to the emergency department with a chief complaint of right knee open wound with drainage.  Patient had a R TKA revision with extensor mechanism reconstruction, incision and drainage on 7/19/24 with Dr Johnston.  Patient was discharged with PICC line on cefepime.  Patient has been seeing her new ID doctor where she lives in [place] who currently has her on bactrim.  Patient states that there has been a small open wound sin e the surgery that has grown in size, it started draining and had a foul odor for the past 10 days.  Patient sent to ED by Dr Johnston.  Patient will be admitted to orthopedics service for surgery tomorrow with Dr Johnston. Denies CP, SOB, calf pain.   Ambulation: Walking independently     Daily Height in cm: 157.48 (17 Oct 2024 15:14)    Daily     (17 Oct 2024 17:07)    INTERVAL HPI/OVERNIGHT EVENTS:    ROS: All negative other than as documented above    Vital Signs Last 24 Hrs  T(C): 37 (17 Oct 2024 23:39), Max: 37.2 (17 Oct 2024 15:14)  T(F): 98.6 (17 Oct 2024 23:39), Max: 99 (17 Oct 2024 15:14)  HR: 72 (17 Oct 2024 23:39) (72 - 111)  BP: 140/66 (17 Oct 2024 23:39) (105/46 - 144/77)  BP(mean): --  RR: 18 (17 Oct 2024 23:39) (18 - 18)  SpO2: 95% (17 Oct 2024 23:39) (94% - 97%)    Parameters below as of 17 Oct 2024 23:39  Patient On (Oxygen Delivery Method): room air    PHYSICAL EXAM:    Vital Signs Last 24 Hrs  T(C): 37 (17 Oct 2024 23:39), Max: 37.2 (17 Oct 2024 15:14)  T(F): 98.6 (17 Oct 2024 23:39), Max: 99 (17 Oct 2024 15:14)  HR: 72 (17 Oct 2024 23:39) (72 - 111)  BP: 140/66 (17 Oct 2024 23:39) (105/46 - 144/77)  BP(mean): --  RR: 18 (17 Oct 2024 23:39) (18 - 18)  SpO2: 95% (17 Oct 2024 23:39) (94% - 97%)    Parameters below as of 17 Oct 2024 23:39  Patient On (Oxygen Delivery Method): room air    General: Age-appearing, in no acute distress  Head: Normochephalic, atraumatic  ENMT: EOMI, neck supple  Cardiovascular: +S1, S2; Regular rate and rhythm, no murmurs, rubs, gallops  Respiratory: CTA BL, no wheezes, rales, rhonchi  Gastrointestinal: Abdomen soft, non-tender, +BS in all 4 quadrants  Extremities: No clubbing, cyanosis, or edema  Vascular: 2+ pulses, cap refill < 2 seconds  Neuro: Non-focal, AAOx4, sensation intact BL  Musculoskeletal: Normal tone, no deformities  Skin: Warm, dry; no acute rash seen  Psych: Appropriate, cooperative      I&O's Detail                        9.6    6.58  )-----------( 377      ( 17 Oct 2024 18:11 )             28.8     17 Oct 2024 18:11    131    |  98     |  21.8   ----------------------------<  103    5.1     |  19.0   |  1.04     Ca    9.0        17 Oct 2024 18:11    TPro  7.6    /  Alb  3.5    /  TBili  <0.2   /  DBili  x      /  AST  78     /  ALT  94     /  AlkPhos  94     17 Oct 2024 18:11    PT/INR - ( 17 Oct 2024 18:11 )   PT: 13.9 sec;   INR: 1.20 ratio         PTT - ( 17 Oct 2024 18:11 )  PTT:31.8 sec  CAPILLARY BLOOD GLUCOSE    LIVER FUNCTIONS - ( 17 Oct 2024 18:11 )  Alb: 3.5 g/dL / Pro: 7.6 g/dL / ALK PHOS: 94 U/L / ALT: 94 U/L / AST: 78 U/L / GGT: x           Urinalysis Basic - ( 17 Oct 2024 18:11 )    Color: x / Appearance: x / SG: x / pH: x  Gluc: 103 mg/dL / Ketone: x  / Bili: x / Urobili: x   Blood: x / Protein: x / Nitrite: x   Leuk Esterase: x / RBC: x / WBC x   Sq Epi: x / Non Sq Epi: x / Bacteria: x    MEDICATIONS  (STANDING):  amLODIPine   Tablet 5 milliGRAM(s) Oral daily  chlorhexidine 2% Cloths 1 Application(s) Topical every 12 hours  DAPTOmycin IVPB      meropenem Injectable 1000 milliGRAM(s) IV Push every 8 hours  metoprolol tartrate 50 milliGRAM(s) Oral two times a day  mirabegron ER 25 milliGRAM(s) Oral daily  mupirocin 2% Ointment 1 Application(s) Both Nostrils two times a day  povidone iodine 10% Nasal Swab 1 Application(s) Both Nostrils once  senna 2 Tablet(s) Oral at bedtime  sodium chloride 0.9%. 1000 milliLiter(s) (75 mL/Hr) IV Continuous <Continuous>  valsartan 320 milliGRAM(s) Oral daily  venlafaxine XR. 150 milliGRAM(s) Oral daily    MEDICATIONS  (PRN):  acetaminophen     Tablet .. 975 milliGRAM(s) Oral every 8 hours PRN Mild Pain (1 - 3)  oxyCODONE    IR 5 milliGRAM(s) Oral every 6 hours PRN Moderate Pain (4 - 6)      RADIOLOGY & ADDITIONAL TESTS: Patient seen and evaluated prior to midnight on 10/17/24    CC: Knee wound infection  HPI:  MRN: [id number]    Patient is a 89y Female presenting to the emergency department with a chief complaint of right knee open wound with drainage.  Patient had a R TKA revision with extensor mechanism reconstruction, incision and drainage on 7/19/24 with Dr Johnston.  Patient was discharged with PICC line on cefepime.  Patient has been seeing her new ID doctor where she lives in [place] who currently has her on bactrim.  Patient states that there has been a small open wound sin e the surgery that has grown in size, it started draining and had a foul odor for the past 10 days.  Patient sent to ED by Dr Johnston.  Patient will be admitted to orthopedics service for surgery tomorrow with Dr Johnston. Denies CP, SOB, calf pain.   Ambulation: Walking independently     Daily Height in cm: 157.48 (17 Oct 2024 15:14)    Daily     (17 Oct 2024 17:07)    INTERVAL HPI/OVERNIGHT EVENTS: Patient seen and examined bedside. She is doing well, pain controlled. She explained wound has never closed since surgery in 7/2024. She has been on multiple antibiotics, complicated by C. diff for which she is still on Dificid. She reports foul smelling drainage from the knee.    ROS: All negative other than as documented above    Vital Signs Last 24 Hrs  T(C): 37 (17 Oct 2024 23:39), Max: 37.2 (17 Oct 2024 15:14)  T(F): 98.6 (17 Oct 2024 23:39), Max: 99 (17 Oct 2024 15:14)  HR: 72 (17 Oct 2024 23:39) (72 - 111)  BP: 140/66 (17 Oct 2024 23:39) (105/46 - 144/77)  BP(mean): --  RR: 18 (17 Oct 2024 23:39) (18 - 18)  SpO2: 95% (17 Oct 2024 23:39) (94% - 97%)    Parameters below as of 17 Oct 2024 23:39  Patient On (Oxygen Delivery Method): room air    PHYSICAL EXAM:    Vital Signs Last 24 Hrs  T(C): 37 (17 Oct 2024 23:39), Max: 37.2 (17 Oct 2024 15:14)  T(F): 98.6 (17 Oct 2024 23:39), Max: 99 (17 Oct 2024 15:14)  HR: 72 (17 Oct 2024 23:39) (72 - 111)  BP: 140/66 (17 Oct 2024 23:39) (105/46 - 144/77)  BP(mean): --  RR: 18 (17 Oct 2024 23:39) (18 - 18)  SpO2: 95% (17 Oct 2024 23:39) (94% - 97%)    Parameters below as of 17 Oct 2024 23:39  Patient On (Oxygen Delivery Method): room air    General: Age-appearing, in no acute distress  Head: Normochephalic, atraumatic  ENMT: EOMI, neck supple  Cardiovascular: +S1, S2; Regular rate and rhythm, no murmurs, rubs, gallops  Respiratory: CTA BL, no wheezes, rales, rhonchi  Gastrointestinal: Abdomen soft, non-tender, +BS in all 4 quadrants  Extremities: No clubbing, cyanosis, or edema; right knee quarter size open wound anterior knee.  Notable discharge foul smell.  No erythema or warmth noted.   Vascular: 2+ pulses, cap refill < 2 seconds  Neuro: Non-focal, AAOx4, sensation intact BL  Musculoskeletal: Normal tone, no deformities  Skin: Warm, dry; no acute rash seen  Psych: Appropriate, cooperative      I&O's Detail                        9.6    6.58  )-----------( 377      ( 17 Oct 2024 18:11 )             28.8     17 Oct 2024 18:11    131    |  98     |  21.8   ----------------------------<  103    5.1     |  19.0   |  1.04     Ca    9.0        17 Oct 2024 18:11    TPro  7.6    /  Alb  3.5    /  TBili  <0.2   /  DBili  x      /  AST  78     /  ALT  94     /  AlkPhos  94     17 Oct 2024 18:11    PT/INR - ( 17 Oct 2024 18:11 )   PT: 13.9 sec;   INR: 1.20 ratio         PTT - ( 17 Oct 2024 18:11 )  PTT:31.8 sec  CAPILLARY BLOOD GLUCOSE    LIVER FUNCTIONS - ( 17 Oct 2024 18:11 )  Alb: 3.5 g/dL / Pro: 7.6 g/dL / ALK PHOS: 94 U/L / ALT: 94 U/L / AST: 78 U/L / GGT: x           Urinalysis Basic - ( 17 Oct 2024 18:11 )    Color: x / Appearance: x / SG: x / pH: x  Gluc: 103 mg/dL / Ketone: x  / Bili: x / Urobili: x   Blood: x / Protein: x / Nitrite: x   Leuk Esterase: x / RBC: x / WBC x   Sq Epi: x / Non Sq Epi: x / Bacteria: x    MEDICATIONS  (STANDING):  amLODIPine   Tablet 5 milliGRAM(s) Oral daily  chlorhexidine 2% Cloths 1 Application(s) Topical every 12 hours  DAPTOmycin IVPB      meropenem Injectable 1000 milliGRAM(s) IV Push every 8 hours  metoprolol tartrate 50 milliGRAM(s) Oral two times a day  mirabegron ER 25 milliGRAM(s) Oral daily  mupirocin 2% Ointment 1 Application(s) Both Nostrils two times a day  povidone iodine 10% Nasal Swab 1 Application(s) Both Nostrils once  senna 2 Tablet(s) Oral at bedtime  sodium chloride 0.9%. 1000 milliLiter(s) (75 mL/Hr) IV Continuous <Continuous>  valsartan 320 milliGRAM(s) Oral daily  venlafaxine XR. 150 milliGRAM(s) Oral daily    MEDICATIONS  (PRN):  acetaminophen     Tablet .. 975 milliGRAM(s) Oral every 8 hours PRN Mild Pain (1 - 3)  oxyCODONE    IR 5 milliGRAM(s) Oral every 6 hours PRN Moderate Pain (4 - 6)      RADIOLOGY & ADDITIONAL TESTS:

## 2024-10-18 NOTE — CONSULT NOTE ADULT - TIME BILLING
This includes chart review, patient assessment, discussion with Dr Johnston. Antibiotic dosing and management with clinical pharmacy.
Time spent on patient encounter including emergency department chart review, history taking and physical exam, developing patient plan and tailoring H&P, as well as discussion with patient, family, RN, and other providers involved in patient's care.

## 2024-10-18 NOTE — PROGRESS NOTE ADULT - SUBJECTIVE AND OBJECTIVE BOX
TABITHA [place]    [id number]    89y      Female    Patient is a 89y old  Female who presents with a chief complaint of     INTERVAL HPI/OVERNIGHT EVENTS:    Patient is doing ok, denies fever, chills, chest pain, sob, dizziness       Vital Signs Last 24 Hrs  T(C): 36.8 (18 Oct 2024 08:48), Max: 37.4 (18 Oct 2024 04:28)  T(F): 98.2 (18 Oct 2024 08:48), Max: 99.4 (18 Oct 2024 04:28)  HR: 85 (18 Oct 2024 08:48) (72 - 111)  BP: 116/64 (18 Oct 2024 08:48) (105/46 - 144/77)  RR: 14 (18 Oct 2024 08:48) (14 - 18)  SpO2: 92% (18 Oct 2024 08:48) (92% - 97%)    Parameters below as of 18 Oct 2024 08:48  Patient On (Oxygen Delivery Method): room air        PHYSICAL EXAM:    GENERAL: Elderly female looking comfortable   HEENT: PERRL, +EOMI  NECK: soft, Supple, No JVD  CHEST/LUNG: Clear to auscultate bilaterally; No wheezing  HEART: S1S2+, Regular rate and rhythm; No murmurs  ABDOMEN: Soft, Nontender, Nondistended; Bowel sounds present  EXTREMITIES:  1+ Peripheral Pulses, No edema, right knee with tenderness   SKIN: No rashes or lesions  NEURO: AAOX3  PSYCH: normal mood      LABS:                        9.6    6.58  )-----------( 377      ( 17 Oct 2024 18:11 )             28.8     10-17    131[L]  |  98  |  21.8[H]  ----------------------------<  103[H]  5.1   |  19.0[L]  |  1.04    Ca    9.0      17 Oct 2024 18:11    TPro  7.6  /  Alb  3.5  /  TBili  <0.2[L]  /  DBili  x   /  AST  78[H]  /  ALT  94[H]  /  AlkPhos  94  10-17    PT/INR - ( 17 Oct 2024 18:11 )   PT: 13.9 sec;   INR: 1.20 ratio         PTT - ( 17 Oct 2024 18:11 )  PTT:31.8 sec        I&O's Summary      MEDICATIONS  (STANDING):  amLODIPine   Tablet 5 milliGRAM(s) Oral daily  aprepitant 40 milliGRAM(s) Oral once  ceFAZolin  Injectable. 2000 milliGRAM(s) IV Push once  DAPTOmycin IVPB      DAPTOmycin IVPB 450 milliGRAM(s) IV Intermittent every 24 hours  influenza  Vaccine (HIGH DOSE) 0.5 milliLiter(s) IntraMuscular once  meropenem Injectable 1000 milliGRAM(s) IV Push every 8 hours  metoprolol succinate ER 50 milliGRAM(s) Oral daily  mirabegron ER 25 milliGRAM(s) Oral daily  mupirocin 2% Ointment 1 Application(s) Both Nostrils two times a day  povidone iodine 10% Nasal Swab 1 Application(s) Both Nostrils once  senna 2 Tablet(s) Oral at bedtime  sodium chloride 0.9%. 1000 milliLiter(s) (75 mL/Hr) IV Continuous <Continuous>  tranexamic acid IVPB 1000 milliGRAM(s) IV Intermittent once  valsartan 320 milliGRAM(s) Oral daily  vancomycin  IVPB 1000 milliGRAM(s) IV Intermittent once  venlafaxine XR. 150 milliGRAM(s) Oral daily    MEDICATIONS  (PRN):  acetaminophen     Tablet .. 975 milliGRAM(s) Oral every 8 hours PRN Mild Pain (1 - 3)  oxyCODONE    IR 5 milliGRAM(s) Oral every 6 hours PRN Moderate Pain (4 - 6)

## 2024-10-18 NOTE — PATIENT PROFILE ADULT - FUNCTIONAL ASSESSMENT - DAILY ACTIVITY 3.
AUTHORIZATION FOR RELEASE OF   CONFIDENTIAL INFORMATION    Dear Enject,    We are seeing Senait Falcon, date of birth 1972, in the clinic at [facility] INTERNAL MEDICINE. Vidal Charles MD is the patient's PCP. Senait Falcon has an outstanding DM EYE EXAM at the time we reviewed her chart. In order to help keep her health information updated, she has authorized us to request the following medical record(s):        (  )  MAMMOGRAM                                      (  )  COLONOSCOPY      (  )  PAP SMEAR                                          (  )  OUTSIDE LAB RESULTS     (  )  DEXA SCAN                                          (  )  EYE EXAM            (  )  FOOT EXAM                                          (  )  ENTIRE RECORD     (  )  OUTSIDE IMMUNIZATIONS                 (  )  3RD REQUEST    Pt reported having had a Diabetic Eye please forward record     Please fax records to [facility], Anderson, Derek J., MD, [phone]     If you have any questions, please contact Magi PACHECO [phone].           Patient Name: Senait Falcon  : 1972  Patient Phone #: [phone]     
       AUTHORIZATION FOR RELEASE OF   CONFIDENTIAL INFORMATION    Dear NGDATA,    We are seeing Senait Falcon, date of birth 1972, in the clinic at [facility] INTERNAL MEDICINE. Vidal Charles MD is the patient's PCP. Senait Falcon has an outstanding  DM eye exam , she has authorized us to request the following medical record(s):        (  )  MAMMOGRAM                                      (  )  COLONOSCOPY      (  )  PAP SMEAR                                          (  )  OUTSIDE LAB RESULTS     (  )  DEXA SCAN                                          ( X ) DM  EYE EXAM            (  )  FOOT EXAM                                          (  )  ENTIRE RECORD     (  )  OUTSIDE IMMUNIZATIONS                 (X )  2ND request    Pt reported having had a Diabetic Eye exam with your office please forward any exams done      Please FAX records to [facility], Anderson, Derek J., MD, [phone]     If you have any questions, please contact Magi PACHECO [phone].           Patient Name: Senait Falcon  : 1972  Patient Phone #: [phone]     
       AUTHORIZATION FOR RELEASE OF   CONFIDENTIAL INFORMATION    Dear Parascale,    We are seeing Senait Falcon, date of birth 1972, in the clinic at [facility] INTERNAL MEDICINE. Vidal Charles MD is the patient's PCP. Senait Falcon has an outstanding DM EYE EXAM at the time we reviewed her chart. In order to help keep her health information updated, she has authorized us to request the following medical record(s):        (  )  MAMMOGRAM                                      (  )  COLONOSCOPY      (  )  PAP SMEAR                                          (  )  OUTSIDE LAB RESULTS     (  )  DEXA SCAN                                          ( x )  DM EYE EXAM            (  )  FOOT EXAM                                          (  )  ENTIRE RECORD     (  )  OUTSIDE IMMUNIZATIONS                 (  )  _______________      Pt reported having had a Diabetic Eye exam with your office please forward any exams done      Please FAX  records to [facility], Anderson, Derek J., MD, [phone]     If you have any questions, please contact Magi PACHECO [phone].           Patient Name: Senait Falcon  : 1972  Patient Phone #: [phone]     
       AUTHORIZATION FOR RELEASE OF   CONFIDENTIAL INFORMATION    Dear Tushky,    We are seeing Senait Falcon, date of birth 1972, in the clinic at [facility] INTERNAL MEDICINE. Vidal Charles MD is the patient's PCP. Senait Falcon has an outstanding DIABETIC EYE EXAM  at the time we reviewed her chart. In order to help keep her health information updated, she has authorized us to request the following medical record(s):        (  )  MAMMOGRAM                                      (  )  COLONOSCOPY      (  )  PAP SMEAR                                          (  )  OUTSIDE LAB RESULTS     (  )  DEXA SCAN                                          ( x ) DM EYE EXAM            (  )  FOOT EXAM                                          (  )  ENTIRE RECORD     (  )  OUTSIDE IMMUNIZATIONS                 (  )  _______________    Please forward DM eye exams done 24 and 2024.     Please FAX  records to [facility], Anderson, Derek J., MD, [phone]     If you have any questions, please contact Magi PACHECO [phone].           Patient Name: Senait Falcon  : 1972  Patient Phone #: [phone]     
3 = A little assistance

## 2024-10-19 LAB
ALBUMIN SERPL ELPH-MCNC: 2.6 G/DL — LOW (ref 3.3–5.2)
ALP SERPL-CCNC: 78 U/L — SIGNIFICANT CHANGE UP (ref 40–120)
ALT FLD-CCNC: 58 U/L — HIGH
ANION GAP SERPL CALC-SCNC: 12 MMOL/L — SIGNIFICANT CHANGE UP (ref 5–17)
ANION GAP SERPL CALC-SCNC: 12 MMOL/L — SIGNIFICANT CHANGE UP (ref 5–17)
ANION GAP SERPL CALC-SCNC: 13 MMOL/L — SIGNIFICANT CHANGE UP (ref 5–17)
ANION GAP SERPL CALC-SCNC: 14 MMOL/L — SIGNIFICANT CHANGE UP (ref 5–17)
AST SERPL-CCNC: 52 U/L — HIGH
BASOPHILS # BLD AUTO: 0 K/UL — SIGNIFICANT CHANGE UP (ref 0–0.2)
BASOPHILS NFR BLD AUTO: 0 % — SIGNIFICANT CHANGE UP (ref 0–2)
BILIRUB SERPL-MCNC: <0.2 MG/DL — LOW (ref 0.4–2)
BUN SERPL-MCNC: 14.6 MG/DL — SIGNIFICANT CHANGE UP (ref 8–20)
BUN SERPL-MCNC: 15 MG/DL — SIGNIFICANT CHANGE UP (ref 8–20)
BUN SERPL-MCNC: 15.5 MG/DL — SIGNIFICANT CHANGE UP (ref 8–20)
BUN SERPL-MCNC: 18.8 MG/DL — SIGNIFICANT CHANGE UP (ref 8–20)
BURR CELLS BLD QL SMEAR: PRESENT — SIGNIFICANT CHANGE UP
CALCIUM SERPL-MCNC: 8.3 MG/DL — LOW (ref 8.4–10.5)
CALCIUM SERPL-MCNC: 8.4 MG/DL — SIGNIFICANT CHANGE UP (ref 8.4–10.5)
CALCIUM SERPL-MCNC: 8.5 MG/DL — SIGNIFICANT CHANGE UP (ref 8.4–10.5)
CALCIUM SERPL-MCNC: 9 MG/DL — SIGNIFICANT CHANGE UP (ref 8.4–10.5)
CHLORIDE SERPL-SCNC: 101 MMOL/L — SIGNIFICANT CHANGE UP (ref 96–108)
CHLORIDE SERPL-SCNC: 102 MMOL/L — SIGNIFICANT CHANGE UP (ref 96–108)
CHLORIDE SERPL-SCNC: 99 MMOL/L — SIGNIFICANT CHANGE UP (ref 96–108)
CHLORIDE SERPL-SCNC: 99 MMOL/L — SIGNIFICANT CHANGE UP (ref 96–108)
CO2 SERPL-SCNC: 15 MMOL/L — LOW (ref 22–29)
CO2 SERPL-SCNC: 15 MMOL/L — LOW (ref 22–29)
CO2 SERPL-SCNC: 16 MMOL/L — LOW (ref 22–29)
CO2 SERPL-SCNC: 17 MMOL/L — LOW (ref 22–29)
CREAT SERPL-MCNC: 0.61 MG/DL — SIGNIFICANT CHANGE UP (ref 0.5–1.3)
CREAT SERPL-MCNC: 0.64 MG/DL — SIGNIFICANT CHANGE UP (ref 0.5–1.3)
CREAT SERPL-MCNC: 0.65 MG/DL — SIGNIFICANT CHANGE UP (ref 0.5–1.3)
CREAT SERPL-MCNC: 0.7 MG/DL — SIGNIFICANT CHANGE UP (ref 0.5–1.3)
EGFR: 83 ML/MIN/1.73M2 — SIGNIFICANT CHANGE UP
EGFR: 85 ML/MIN/1.73M2 — SIGNIFICANT CHANGE UP
EGFR: 85 ML/MIN/1.73M2 — SIGNIFICANT CHANGE UP
EGFR: 86 ML/MIN/1.73M2 — SIGNIFICANT CHANGE UP
EOSINOPHIL # BLD AUTO: 0 K/UL — SIGNIFICANT CHANGE UP (ref 0–0.5)
EOSINOPHIL NFR BLD AUTO: 0 % — SIGNIFICANT CHANGE UP (ref 0–6)
GIANT PLATELETS BLD QL SMEAR: PRESENT — SIGNIFICANT CHANGE UP
GLUCOSE SERPL-MCNC: 111 MG/DL — HIGH (ref 70–99)
GLUCOSE SERPL-MCNC: 116 MG/DL — HIGH (ref 70–99)
GLUCOSE SERPL-MCNC: 117 MG/DL — HIGH (ref 70–99)
GLUCOSE SERPL-MCNC: 154 MG/DL — HIGH (ref 70–99)
GRAM STN FLD: ABNORMAL
HCT VFR BLD CALC: 28.7 % — LOW (ref 34.5–45)
HGB BLD-MCNC: 8.8 G/DL — LOW (ref 11.5–15.5)
LYMPHOCYTES # BLD AUTO: 0.3 K/UL — LOW (ref 1–3.3)
LYMPHOCYTES # BLD AUTO: 3.5 % — LOW (ref 13–44)
MAGNESIUM SERPL-MCNC: 1.8 MG/DL — SIGNIFICANT CHANGE UP (ref 1.6–2.6)
MAGNESIUM SERPL-MCNC: 1.9 MG/DL — SIGNIFICANT CHANGE UP (ref 1.6–2.6)
MAGNESIUM SERPL-MCNC: 2 MG/DL — SIGNIFICANT CHANGE UP (ref 1.6–2.6)
MANUAL SMEAR VERIFICATION: SIGNIFICANT CHANGE UP
MCHC RBC-ENTMCNC: 30.7 GM/DL — LOW (ref 32–36)
MCHC RBC-ENTMCNC: 31.7 PG — SIGNIFICANT CHANGE UP (ref 27–34)
MCV RBC AUTO: 103.2 FL — HIGH (ref 80–100)
MONOCYTES # BLD AUTO: 0.3 K/UL — SIGNIFICANT CHANGE UP (ref 0–0.9)
MONOCYTES NFR BLD AUTO: 3.5 % — SIGNIFICANT CHANGE UP (ref 2–14)
NEUTROPHILS # BLD AUTO: 7.99 K/UL — HIGH (ref 1.8–7.4)
NEUTROPHILS NFR BLD AUTO: 93 % — HIGH (ref 43–77)
PHOSPHATE SERPL-MCNC: 2.8 MG/DL — SIGNIFICANT CHANGE UP (ref 2.4–4.7)
PHOSPHATE SERPL-MCNC: 3.2 MG/DL — SIGNIFICANT CHANGE UP (ref 2.4–4.7)
PHOSPHATE SERPL-MCNC: 3.3 MG/DL — SIGNIFICANT CHANGE UP (ref 2.4–4.7)
PLAT MORPH BLD: NORMAL — SIGNIFICANT CHANGE UP
PLATELET # BLD AUTO: 340 K/UL — SIGNIFICANT CHANGE UP (ref 150–400)
POIKILOCYTOSIS BLD QL AUTO: SIGNIFICANT CHANGE UP
POLYCHROMASIA BLD QL SMEAR: SIGNIFICANT CHANGE UP
POTASSIUM SERPL-MCNC: 5.4 MMOL/L — HIGH (ref 3.5–5.3)
POTASSIUM SERPL-MCNC: 5.7 MMOL/L — HIGH (ref 3.5–5.3)
POTASSIUM SERPL-MCNC: 6.1 MMOL/L — CRITICAL HIGH (ref 3.5–5.3)
POTASSIUM SERPL-MCNC: 6.1 MMOL/L — CRITICAL HIGH (ref 3.5–5.3)
POTASSIUM SERPL-SCNC: 5.4 MMOL/L — HIGH (ref 3.5–5.3)
POTASSIUM SERPL-SCNC: 5.7 MMOL/L — HIGH (ref 3.5–5.3)
POTASSIUM SERPL-SCNC: 6.1 MMOL/L — CRITICAL HIGH (ref 3.5–5.3)
POTASSIUM SERPL-SCNC: 6.1 MMOL/L — CRITICAL HIGH (ref 3.5–5.3)
PROT SERPL-MCNC: 6.4 G/DL — LOW (ref 6.6–8.7)
RBC # BLD: 2.78 M/UL — LOW (ref 3.8–5.2)
RBC # FLD: 14.3 % — SIGNIFICANT CHANGE UP (ref 10.3–14.5)
RBC BLD AUTO: ABNORMAL
SODIUM SERPL-SCNC: 128 MMOL/L — LOW (ref 135–145)
SODIUM SERPL-SCNC: 128 MMOL/L — LOW (ref 135–145)
SODIUM SERPL-SCNC: 129 MMOL/L — LOW (ref 135–145)
SODIUM SERPL-SCNC: 130 MMOL/L — LOW (ref 135–145)
SPECIMEN SOURCE: SIGNIFICANT CHANGE UP
VANCOMYCIN FLD-MCNC: 9.1 UG/ML — SIGNIFICANT CHANGE UP
WBC # BLD: 8.59 K/UL — SIGNIFICANT CHANGE UP (ref 3.8–10.5)
WBC # FLD AUTO: 8.59 K/UL — SIGNIFICANT CHANGE UP (ref 3.8–10.5)

## 2024-10-19 PROCEDURE — 99232 SBSQ HOSP IP/OBS MODERATE 35: CPT

## 2024-10-19 RX ORDER — SODIUM ZIRCONIUM CYCLOSILICATE 10 G/10G
10 POWDER, FOR SUSPENSION ORAL ONCE
Refills: 0 | Status: COMPLETED | OUTPATIENT
Start: 2024-10-19 | End: 2024-10-19

## 2024-10-19 RX ORDER — SODIUM ZIRCONIUM CYCLOSILICATE 10 G/10G
5 POWDER, FOR SUSPENSION ORAL ONCE
Refills: 0 | Status: COMPLETED | OUTPATIENT
Start: 2024-10-19 | End: 2024-10-20

## 2024-10-19 RX ORDER — INSULIN REG, HUM S-S BUFF 100/ML
5 VIAL (ML) INJECTION ONCE
Refills: 0 | Status: DISCONTINUED | OUTPATIENT
Start: 2024-10-19 | End: 2024-10-19

## 2024-10-19 RX ADMIN — VALSARTAN 320 MILLIGRAM(S): 160 TABLET ORAL at 05:14

## 2024-10-19 RX ADMIN — Medication 4 MILLIGRAM(S): at 03:05

## 2024-10-19 RX ADMIN — Medication 50 MILLIGRAM(S): at 05:14

## 2024-10-19 RX ADMIN — VANCOMYCIN HYDROCHLORIDE 250 MILLIGRAM(S): KIT at 05:13

## 2024-10-19 RX ADMIN — Medication 975 MILLIGRAM(S): at 15:58

## 2024-10-19 RX ADMIN — APIXABAN 2.5 MILLIGRAM(S): 5 TABLET, FILM COATED ORAL at 20:07

## 2024-10-19 RX ADMIN — Medication 2000 MILLIGRAM(S): at 03:01

## 2024-10-19 RX ADMIN — Medication 150 MILLIGRAM(S): at 11:48

## 2024-10-19 RX ADMIN — Medication 4 MILLIGRAM(S): at 04:05

## 2024-10-19 RX ADMIN — SODIUM ZIRCONIUM CYCLOSILICATE 10 GRAM(S): 10 POWDER, FOR SUSPENSION ORAL at 11:48

## 2024-10-19 RX ADMIN — Medication 5 MILLIGRAM(S): at 05:14

## 2024-10-19 RX ADMIN — OXYCODONE HYDROCHLORIDE 5 MILLIGRAM(S): 30 TABLET ORAL at 11:47

## 2024-10-19 RX ADMIN — Medication 975 MILLIGRAM(S): at 23:06

## 2024-10-19 RX ADMIN — MEROPENEM 1000 MILLIGRAM(S): 1 INJECTION INTRAVENOUS at 20:07

## 2024-10-19 RX ADMIN — POLYETHYLENE GLYCOL 3350 17 GRAM(S): 17 POWDER, FOR SOLUTION ORAL at 22:23

## 2024-10-19 RX ADMIN — Medication 4 MILLIGRAM(S): at 22:23

## 2024-10-19 RX ADMIN — Medication 2 TABLET(S): at 22:23

## 2024-10-19 RX ADMIN — PANTOPRAZOLE SODIUM 40 MILLIGRAM(S): 40 TABLET, DELAYED RELEASE ORAL at 05:14

## 2024-10-19 RX ADMIN — Medication 975 MILLIGRAM(S): at 06:05

## 2024-10-19 RX ADMIN — MEROPENEM 1000 MILLIGRAM(S): 1 INJECTION INTRAVENOUS at 05:14

## 2024-10-19 RX ADMIN — Medication 975 MILLIGRAM(S): at 05:14

## 2024-10-19 RX ADMIN — Medication 4 MILLIGRAM(S): at 23:20

## 2024-10-19 RX ADMIN — APIXABAN 2.5 MILLIGRAM(S): 5 TABLET, FILM COATED ORAL at 05:15

## 2024-10-19 NOTE — PHYSICAL THERAPY INITIAL EVALUATION ADULT - GENERAL OBSERVATIONS, REHAB EVAL
Pt received in bed with right IV, 3 KB drains, right knee immobilizer, right ACE wrap, left SCD, cardiac monitor, , NAD.

## 2024-10-19 NOTE — PROGRESS NOTE ADULT - SUBJECTIVE AND OBJECTIVE BOX
TABITHA Austin  [id number]    History: 89y Female is status post right knee I&D with poly swap, patellectomy by Dr. Johnston & gastroc flap and skin graft by Dr. Soriano POD #1. Patient is doing well and is comfortable, RLE in Knee immobilizer. The patient's pain is controlled using the prescribed pain medications. The patient is participating in physical therapy. Denies CP, SOB, dizziness, HA, fever/chills, numbness or tingling. No new complaints.    Vital Signs Last 24 Hrs  T(C): 36.4 (19 Oct 2024 04:35), Max: 37.3 (18 Oct 2024 18:03)  T(F): 97.6 (19 Oct 2024 04:35), Max: 99.2 (18 Oct 2024 18:03)  HR: 81 (19 Oct 2024 04:35) (80 - 108)  BP: 127/73 (19 Oct 2024 04:35) (114/62 - 163/77)  BP(mean): 78 (18 Oct 2024 19:03) (68 - 78)  RR: 18 (19 Oct 2024 04:35) (14 - 23)  SpO2: 95% (19 Oct 2024 04:35) (92% - 100%)    Parameters below as of 19 Oct 2024 04:35  Patient On (Oxygen Delivery Method): room air                              8.8    8.59  )-----------( 340      ( 19 Oct 2024 05:24 )             28.7     10-19    128[L]  |  99  |  15.5  ----------------------------<  116[H]  6.1[HH]   |  16.0[L]  |  0.61    Ca    8.5      19 Oct 2024 05:24  Phos  3.3     10-19  Mg     1.9     10-19    TPro  6.4[L]  /  Alb  2.6[L]  /  TBili  <0.2[L]  /  DBili  x   /  AST  52[H]  /  ALT  58[H]  /  AlkPhos  78  10-19      General: Alert, awake, NAD  Physical exam: The right knee ACE dressing is clean, dry and intact. No drainage, discharge noted. In Knee Immobilizer.   The calf is supple nontender b/l.   SILT. +AT/GC/EHL/FHL.   + DP pulse. BCR. No cyanosis.    Plan:   - Na 128, K 6.1 - Dr. Christina aware. BMP ordered for this AM.   - Continue KI at all times   - Monitor drain outputs - x3 drains  - DVT prophylaxis as prescribed, including use of compression devices and ankle pumps  - Continue physical therapy  - Weight bearing status of surgical extremity: NWB RLE  - Incentive spirometry encouraged  - Pain control as clinically indicated  - F/u ID recs

## 2024-10-19 NOTE — PROGRESS NOTE ADULT - SUBJECTIVE AND OBJECTIVE BOX
TABITHA Laurel Bloomery    [id number]    89y      Female    Patient is a 89y old  Female who presents with a chief complaint of     INTERVAL HPI/OVERNIGHT EVENTS:    Patient is doing ok., her pain is well controlled, denies fever, chills, chest pain, sob.       Vital Signs Last 24 Hrs  T(C): 35.6 (19 Oct 2024 09:37), Max: 37.3 (18 Oct 2024 18:03)  T(F): 96 (19 Oct 2024 09:37), Max: 99.2 (18 Oct 2024 18:03)  HR: 80 (19 Oct 2024 08:22) (80 - 108)  BP: 148/72 (19 Oct 2024 08:22) (114/62 - 163/77)  BP(mean): 78 (18 Oct 2024 19:03) (68 - 78)  RR: 18 (19 Oct 2024 08:22) (18 - 23)  SpO2: 96% (19 Oct 2024 08:22) (95% - 98%)    Parameters below as of 19 Oct 2024 08:22  Patient On (Oxygen Delivery Method): room air        PHYSICAL EXAM:      GENERAL: Elderly female looking comfortable   HEENT: PERRL, +EOMI  NECK: soft, Supple, No JVD  CHEST/LUNG: Clear to auscultate bilaterally; No wheezing  HEART: S1S2+, Regular rate and rhythm; No murmurs  ABDOMEN: Soft, Nontender, Nondistended; Bowel sounds present  EXTREMITIES:  1+ Peripheral Pulses, No edema, right knee with tenderness   SKIN: No rashes or lesions  NEURO: AAOX3  PSYCH: normal mood      LABS:                        8.8    8.59  )-----------( 340      ( 19 Oct 2024 05:24 )             28.7     10-19    128[L]  |  99  |  14.6  ----------------------------<  111[H]  5.7[H]   |  15.0[L]  |  0.65    Ca    9.0      19 Oct 2024 09:12  Phos  3.2     10-19  Mg     2.0     10-19    TPro  6.4[L]  /  Alb  2.6[L]  /  TBili  <0.2[L]  /  DBili  x   /  AST  52[H]  /  ALT  58[H]  /  AlkPhos  78  10-19    PT/INR - ( 17 Oct 2024 18:11 )   PT: 13.9 sec;   INR: 1.20 ratio         PTT - ( 17 Oct 2024 18:11 )  PTT:31.8 secx          I&O's Summary    18 Oct 2024 07:01  -  19 Oct 2024 07:00  --------------------------------------------------------  IN: 0 mL / OUT: 407 mL / NET: -407 mL        MEDICATIONS  (STANDING):  acetaminophen     Tablet .. 975 milliGRAM(s) Oral every 8 hours  amLODIPine   Tablet 5 milliGRAM(s) Oral daily  apixaban 2.5 milliGRAM(s) Oral two times a day  influenza  Vaccine (HIGH DOSE) 0.5 milliLiter(s) IntraMuscular once  meropenem Injectable 1000 milliGRAM(s) IV Push every 12 hours  metoprolol succinate ER 50 milliGRAM(s) Oral daily  pantoprazole    Tablet 40 milliGRAM(s) Oral before breakfast  polyethylene glycol 3350 17 Gram(s) Oral at bedtime  senna 2 Tablet(s) Oral at bedtime  sodium zirconium cyclosilicate 10 Gram(s) Oral once  vancomycin  IVPB 750 milliGRAM(s) IV Intermittent every 24 hours  venlafaxine XR. 150 milliGRAM(s) Oral daily    MEDICATIONS  (PRN):  HYDROmorphone   Tablet 4 milliGRAM(s) Oral every 3 hours PRN Severe Pain (7 - 10)  magnesium hydroxide Suspension 30 milliLiter(s) Oral daily PRN Constipation  ondansetron Injectable 4 milliGRAM(s) IV Push every 6 hours PRN Nausea and/or Vomiting  oxyCODONE    IR 5 milliGRAM(s) Oral every 3 hours PRN Mild Pain (1 - 3)  oxyCODONE    IR 10 milliGRAM(s) Oral every 3 hours PRN Moderate Pain (4 - 6)

## 2024-10-19 NOTE — PROGRESS NOTE ADULT - ASSESSMENT
89y Female presenting to the emergency department with a chief complaint of right knee open wound with drainage. PAtient admitted under orthopedics service planned to go to OR tomorrow am for washout.     Infected Right TKA s/p Washout     - VS stable  - abx per ortho   - opiate induced constipation regimen   - encouraging incentive spirometry   -c/w local wound care per ortho   -DVT prophylaxis and Pain meds as per Ortho team   -PT/OT and weight bearing per ortho   -Vanc/ Meropenum   - cultures  to follow   -ID on board.      HTN/HLD  -Continue Amlodipine 5 mg daily  -Continue Metoprolol 50 mg BID  -will hold Valsartan 320 mg daily due to hyperkalemia   will continue BP meds with holding parameters   -Patient reports statin was recently d/jose by her doctor  -Continue Atorvastatin 20 mg qhs    Hx of Left Femoral Vein DVT  -Eliquis on hold for OR  -LE doppler negative for DVT  -would resume anticoagulation after surgery    Depression  -Continue Venlafaxine  mg daily    Hx of C. Diff  -Continue with Dificid taper, currently 200 mg every other day.     chronic Anemia: her Hb is ~9, will continue to monitor CBC.     Hyponatremia: seems chronic, will continue to monitor BMP, if trending down will do fluid restriction and salt tabs as she looks euvolemic, will d/c IV fluids    Hyperkalemia: potassium 5.7,  will give lokelma, will monitor BMP.

## 2024-10-19 NOTE — PHARMACOTHERAPY INTERVENTION NOTE - COMMENTS
As per policy, ordered a vancomycin level for 10/20 AM.    Lawrence Shay, PharmD, [facility]  Clinical Pharmacy Specialist, Infectious Diseases  Tele-Antimicrobial Stewardship Program (Tele-ASP)  Tele-ASP Phone: [phone]

## 2024-10-19 NOTE — PHYSICAL THERAPY INITIAL EVALUATION ADULT - ADDITIONAL COMMENTS
as per patient, lives in a house with , can stay on the first floor, 2 EULALIO (has a bracket for a HR), modified independent prior with RW

## 2024-10-19 NOTE — PHYSICAL THERAPY INITIAL EVALUATION ADULT - WORK/LEISURE ACTIVITY, REHAB EVAL
Quinolones Pregnancy And Lactation Text: This medication is Pregnancy Category C and it isn't know if it is safe during pregnancy. It is also excreted in breast milk. independent

## 2024-10-19 NOTE — PHYSICAL THERAPY INITIAL EVALUATION ADULT - LEVEL OF INDEPENDENCE: SIT/SUPINE, REHAB EVAL
[address]  [place] [address] [address]  Phone: [phone]  Fax: [phone]    Radha Huynh,         January 12, 2023     Patient: Armando Saucedo   YOB: 1960   Date of Visit: 1/12/2023       To Whom it May Concern:    Gabrielle Aquino was seen in my clinic on 1/12/2023. He may return to work on 1/16/2023. Please start excuse on 1/11/2023. If you have any questions or concerns, please don't hesitate to call.     Sincerely,         Melissa Bernal, DO
contact guard

## 2024-10-19 NOTE — PHYSICAL THERAPY INITIAL EVALUATION ADULT - ACTIVE RANGE OF MOTION EXAMINATION, REHAB EVAL
decreased right knee ROM/bilateral upper extremity Active ROM was WFL (within functional limits)/Left LE Active ROM was WFL (within functional limits)/deficits as listed below

## 2024-10-19 NOTE — PHYSICAL THERAPY INITIAL EVALUATION ADULT - PERTINENT HX OF CURRENT PROBLEM, REHAB EVAL
Pt s/p right knee I&D with poly swap, patellectomy by Dr. Johnston & gastroc flap and skin graft by Dr. Soriano. RLE in Knee immobilizer in place.

## 2024-10-20 LAB
ANION GAP SERPL CALC-SCNC: 11 MMOL/L — SIGNIFICANT CHANGE UP (ref 5–17)
BUN SERPL-MCNC: 20.6 MG/DL — HIGH (ref 8–20)
CALCIUM SERPL-MCNC: 8.5 MG/DL — SIGNIFICANT CHANGE UP (ref 8.4–10.5)
CHLORIDE SERPL-SCNC: 100 MMOL/L — SIGNIFICANT CHANGE UP (ref 96–108)
CO2 SERPL-SCNC: 19 MMOL/L — LOW (ref 22–29)
CREAT SERPL-MCNC: 0.61 MG/DL — SIGNIFICANT CHANGE UP (ref 0.5–1.3)
EGFR: 86 ML/MIN/1.73M2 — SIGNIFICANT CHANGE UP
GLUCOSE SERPL-MCNC: 110 MG/DL — HIGH (ref 70–99)
HCT VFR BLD CALC: 27.3 % — LOW (ref 34.5–45)
HGB BLD-MCNC: 8.7 G/DL — LOW (ref 11.5–15.5)
MCHC RBC-ENTMCNC: 31.9 GM/DL — LOW (ref 32–36)
MCHC RBC-ENTMCNC: 31.9 PG — SIGNIFICANT CHANGE UP (ref 27–34)
MCV RBC AUTO: 100 FL — SIGNIFICANT CHANGE UP (ref 80–100)
PLATELET # BLD AUTO: 383 K/UL — SIGNIFICANT CHANGE UP (ref 150–400)
POTASSIUM SERPL-MCNC: 5.4 MMOL/L — HIGH (ref 3.5–5.3)
POTASSIUM SERPL-SCNC: 5.4 MMOL/L — HIGH (ref 3.5–5.3)
RBC # BLD: 2.73 M/UL — LOW (ref 3.8–5.2)
RBC # FLD: 14.3 % — SIGNIFICANT CHANGE UP (ref 10.3–14.5)
SODIUM SERPL-SCNC: 130 MMOL/L — LOW (ref 135–145)
VANCOMYCIN FLD-MCNC: 10.6 UG/ML — SIGNIFICANT CHANGE UP
WBC # BLD: 9.35 K/UL — SIGNIFICANT CHANGE UP (ref 3.8–10.5)
WBC # FLD AUTO: 9.35 K/UL — SIGNIFICANT CHANGE UP (ref 3.8–10.5)

## 2024-10-20 PROCEDURE — 99232 SBSQ HOSP IP/OBS MODERATE 35: CPT

## 2024-10-20 RX ORDER — TAMSULOSIN HCL 0.4 MG
0.4 CAPSULE ORAL AT BEDTIME
Refills: 0 | Status: DISCONTINUED | OUTPATIENT
Start: 2024-10-20 | End: 2024-10-29

## 2024-10-20 RX ORDER — TAMSULOSIN HCL 0.4 MG
0.4 CAPSULE ORAL ONCE
Refills: 0 | Status: COMPLETED | OUTPATIENT
Start: 2024-10-20 | End: 2024-10-20

## 2024-10-20 RX ORDER — SODIUM ZIRCONIUM CYCLOSILICATE 10 G/10G
5 POWDER, FOR SUSPENSION ORAL ONCE
Refills: 0 | Status: COMPLETED | OUTPATIENT
Start: 2024-10-20 | End: 2024-10-20

## 2024-10-20 RX ADMIN — APIXABAN 2.5 MILLIGRAM(S): 5 TABLET, FILM COATED ORAL at 06:03

## 2024-10-20 RX ADMIN — Medication 0.4 MILLIGRAM(S): at 21:45

## 2024-10-20 RX ADMIN — Medication 2 TABLET(S): at 21:44

## 2024-10-20 RX ADMIN — PANTOPRAZOLE SODIUM 40 MILLIGRAM(S): 40 TABLET, DELAYED RELEASE ORAL at 06:03

## 2024-10-20 RX ADMIN — Medication 150 MILLIGRAM(S): at 14:22

## 2024-10-20 RX ADMIN — Medication 975 MILLIGRAM(S): at 14:21

## 2024-10-20 RX ADMIN — Medication 50 MILLIGRAM(S): at 06:02

## 2024-10-20 RX ADMIN — APIXABAN 2.5 MILLIGRAM(S): 5 TABLET, FILM COATED ORAL at 17:07

## 2024-10-20 RX ADMIN — MEROPENEM 1000 MILLIGRAM(S): 1 INJECTION INTRAVENOUS at 06:03

## 2024-10-20 RX ADMIN — Medication 975 MILLIGRAM(S): at 22:44

## 2024-10-20 RX ADMIN — Medication 975 MILLIGRAM(S): at 07:00

## 2024-10-20 RX ADMIN — Medication 0.4 MILLIGRAM(S): at 14:21

## 2024-10-20 RX ADMIN — Medication 5 MILLIGRAM(S): at 06:02

## 2024-10-20 RX ADMIN — Medication 975 MILLIGRAM(S): at 15:21

## 2024-10-20 RX ADMIN — Medication 975 MILLIGRAM(S): at 06:02

## 2024-10-20 RX ADMIN — POLYETHYLENE GLYCOL 3350 17 GRAM(S): 17 POWDER, FOR SOLUTION ORAL at 21:47

## 2024-10-20 RX ADMIN — SODIUM ZIRCONIUM CYCLOSILICATE 5 GRAM(S): 10 POWDER, FOR SUSPENSION ORAL at 09:29

## 2024-10-20 RX ADMIN — OXYCODONE HYDROCHLORIDE 5 MILLIGRAM(S): 30 TABLET ORAL at 17:07

## 2024-10-20 RX ADMIN — Medication 975 MILLIGRAM(S): at 21:44

## 2024-10-20 RX ADMIN — MEROPENEM 1000 MILLIGRAM(S): 1 INJECTION INTRAVENOUS at 17:06

## 2024-10-20 RX ADMIN — Medication 975 MILLIGRAM(S): at 00:00

## 2024-10-20 RX ADMIN — SODIUM ZIRCONIUM CYCLOSILICATE 5 GRAM(S): 10 POWDER, FOR SUSPENSION ORAL at 02:47

## 2024-10-20 RX ADMIN — OXYCODONE HYDROCHLORIDE 5 MILLIGRAM(S): 30 TABLET ORAL at 18:07

## 2024-10-20 RX ADMIN — VANCOMYCIN HYDROCHLORIDE 250 MILLIGRAM(S): KIT at 06:18

## 2024-10-20 NOTE — PROGRESS NOTE ADULT - ASSESSMENT
89y Female presenting to the emergency department with a chief complaint of right knee open wound with drainage. PAtient admitted under orthopedics service planned to go to OR tomorrow am for washout.         Plan:       Infected Right TKA s/p Washout     - VS stable  - abx per ortho   - opiate induced constipation regimen   - encouraging incentive spirometry   -c/w local wound care per ortho   -DVT prophylaxis and Pain meds as per Ortho team   -PT/OT and weight bearing per ortho   -Vanc/ Meropenum   - cultures  to follow   -ID on board.      HTN/HLD  -Continue Amlodipine 5 mg daily  -Continue Metoprolol 50 mg BID  -will hold Valsartan 320 mg daily due to hyperkalemia   will continue BP meds with holding parameters   -Patient reports statin was recently d/jose by her doctor  -Continue Atorvastatin 20 mg qhs    Hx of Left Femoral Vein DVT  -Eliquis on hold for OR  -LE doppler negative for DVT  -would resume anticoagulation after surgery    Depression  -Continue Venlafaxine  mg daily.     Hx of C. Diff  -Continue with Dificid taper, currently 200 mg every other day.     chronic Anemia: her Hb is ~9, will continue to monitor CBC.     Hyponatremia: seems chronic, will continue to monitor BMP, if trending down will do fluid restriction and salt tabs as she looks euvolemic, will d/c IV fluids    Hyperkalemia: potassium 5.4,  will give Lokelma, will monitor BMP.

## 2024-10-20 NOTE — PROGRESS NOTE ADULT - SUBJECTIVE AND OBJECTIVE BOX
TABITHA Kirby    [id number]    89y      Female    Patient is a 89y old  Female who presents with a chief complaint of     INTERVAL HPI/OVERNIGHT EVENTS:    Patient is doing ok, her pain is well controlled, denies fever, chills, chest pain, sob      Vital Signs Last 24 Hrs  T(C): 37.2 (20 Oct 2024 09:15), Max: 37.2 (20 Oct 2024 09:15)  T(F): 98.9 (20 Oct 2024 09:15), Max: 98.9 (20 Oct 2024 09:15)  HR: 76 (20 Oct 2024 09:15) (76 - 94)  BP: 132/67 (20 Oct 2024 09:15) (111/63 - 132/67)  RR: 14 (20 Oct 2024 09:15) (14 - 18)  SpO2: 95% (20 Oct 2024 09:15) (95% - 97%)    Parameters below as of 20 Oct 2024 09:15  Patient On (Oxygen Delivery Method): room air        PHYSICAL EXAM:    GENERAL: Elderly female looking comfortable   HEENT: PERRL, +EOMI  NECK: soft, Supple, No JVD  CHEST/LUNG: Clear to auscultate bilaterally; No wheezing  HEART: S1S2+, Regular rate and rhythm; No murmurs  ABDOMEN: Soft, Nontender, Nondistended; Bowel sounds present  EXTREMITIES:  1+ Peripheral Pulses, No edema, right knee with tenderness, drains on in place, has clean dressings on   SKIN: No rashes or lesions  NEURO: AAOX3  PSYCH: normal mood      LABS:                        8.7    9.35  )-----------( 383      ( 20 Oct 2024 03:36 )             27.3     10-20    130[L]  |  100  |  20.6[H]  ----------------------------<  110[H]  5.4[H]   |  19.0[L]  |  0.61    Ca    8.5      20 Oct 2024 03:36  Phos  2.8     10-19  Mg     1.8     10-19    TPro  6.4[L]  /  Alb  2.6[L]  /  TBili  <0.2[L]  /  DBili  x   /  AST  52[H]  /  ALT  58[H]  /  AlkPhos  78  10-19      I&O's Summary    19 Oct 2024 07:01  -  20 Oct 2024 07:00  --------------------------------------------------------  IN: 0 mL / OUT: 841 mL / NET: -841 mL    20 Oct 2024 07:01  -  20 Oct 2024 12:07  --------------------------------------------------------  IN: 0 mL / OUT: 550 mL / NET: -550 mL        MEDICATIONS  (STANDING):  acetaminophen     Tablet .. 975 milliGRAM(s) Oral every 8 hours  amLODIPine   Tablet 5 milliGRAM(s) Oral daily  apixaban 2.5 milliGRAM(s) Oral two times a day  influenza  Vaccine (HIGH DOSE) 0.5 milliLiter(s) IntraMuscular once  meropenem Injectable 1000 milliGRAM(s) IV Push every 12 hours  metoprolol succinate ER 50 milliGRAM(s) Oral daily  pantoprazole    Tablet 40 milliGRAM(s) Oral before breakfast  polyethylene glycol 3350 17 Gram(s) Oral at bedtime  senna 2 Tablet(s) Oral at bedtime  vancomycin  IVPB 750 milliGRAM(s) IV Intermittent every 24 hours  venlafaxine XR. 150 milliGRAM(s) Oral daily    MEDICATIONS  (PRN):  bisacodyl Suppository 10 milliGRAM(s) Rectal once PRN Constipation  HYDROmorphone   Tablet 4 milliGRAM(s) Oral every 3 hours PRN Severe Pain (7 - 10)  magnesium hydroxide Suspension 30 milliLiter(s) Oral daily PRN Constipation  ondansetron Injectable 4 milliGRAM(s) IV Push every 6 hours PRN Nausea and/or Vomiting  oxyCODONE    IR 5 milliGRAM(s) Oral every 3 hours PRN Mild Pain (1 - 3)  oxyCODONE    IR 10 milliGRAM(s) Oral every 3 hours PRN Moderate Pain (4 - 6)

## 2024-10-20 NOTE — PROGRESS NOTE ADULT - SUBJECTIVE AND OBJECTIVE BOX
[facility]    [id number]    History: Patient seen and eval at bedside. Patient is doing well and is comfortable. The patient's pain is controlled using the prescribed pain medications. The patient is participating in physical therapy. Denies nausea, vomiting, chest pain, shortness of breath, abdominal pain or fever. No new complaints.     Vital Signs Last 24 Hrs  T(C): 37.2 (20 Oct 2024 09:15), Max: 37.2 (20 Oct 2024 09:15)  T(F): 98.9 (20 Oct 2024 09:15), Max: 98.9 (20 Oct 2024 09:15)  HR: 76 (20 Oct 2024 09:15) (76 - 94)  BP: 132/67 (20 Oct 2024 09:15) (88/51 - 132/67)  BP(mean): --  RR: 14 (20 Oct 2024 09:15) (14 - 18)  SpO2: 95% (20 Oct 2024 09:15) (94% - 97%)                         8.7    9.35  )-----------( 383      ( 20 Oct 2024 03:36 )             27.3     10-20    130[L]  |  100  |  20.6[H]  ----------------------------<  110[H]  5.4[H]   |  19.0[L]  |  0.61    PE: NAD, alert, awake  Right LE:   Knee dressing C/D/I, no drainage, no bleeding. Xerofrom in place over anterior knee skin graft and lateral medial leg incision. skin graft site without drainage or bleeding, appears healthy. Right thigh donor skin graft site - ioband intact  EHL/TA/FHL/GS intact, DP pulse 2+  Gross sensation to LT intact distally s/s/DP/SP/tib distrib, Calf soft, NT B/L    Primary Orthopedic Assessment:  • s/p RIGHT total knee replacement revision poly exchange, gastroc flap, skin graft, patellectomy POD# 2    Plan:   DVT prophylaxis as prescribed Eliquis, including use of compression devices and ankle pumps  Continue physical therapy: NON - Weightbearing right LE - knee immobilizer at all times  Abx as per ID  awaiting final OR cultures  As per RN - Bladder scan 550 - straight cath ordered. RN aware  Incentive spirometry encouraged  Pain control as clinically indicated  D/w Dr. Soriano - new xeroform, gauze ABD and ace wrap placed wrapped lightly. KI placed back appropriately. right thigh donor site left in place.

## 2024-10-21 LAB
-  AMPICILLIN: SIGNIFICANT CHANGE UP
-  VANCOMYCIN: SIGNIFICANT CHANGE UP
ANION GAP SERPL CALC-SCNC: 11 MMOL/L — SIGNIFICANT CHANGE UP (ref 5–17)
BUN SERPL-MCNC: 12.2 MG/DL — SIGNIFICANT CHANGE UP (ref 8–20)
CALCIUM SERPL-MCNC: 8.2 MG/DL — LOW (ref 8.4–10.5)
CHLORIDE SERPL-SCNC: 101 MMOL/L — SIGNIFICANT CHANGE UP (ref 96–108)
CO2 SERPL-SCNC: 21 MMOL/L — LOW (ref 22–29)
CREAT SERPL-MCNC: 0.5 MG/DL — SIGNIFICANT CHANGE UP (ref 0.5–1.3)
CULTURE RESULTS: ABNORMAL
EGFR: 90 ML/MIN/1.73M2 — SIGNIFICANT CHANGE UP
GLUCOSE SERPL-MCNC: 129 MG/DL — HIGH (ref 70–99)
HCT VFR BLD CALC: 25.5 % — LOW (ref 34.5–45)
HGB BLD-MCNC: 8.1 G/DL — LOW (ref 11.5–15.5)
MCHC RBC-ENTMCNC: 31.2 PG — SIGNIFICANT CHANGE UP (ref 27–34)
MCHC RBC-ENTMCNC: 31.8 GM/DL — LOW (ref 32–36)
MCV RBC AUTO: 98.1 FL — SIGNIFICANT CHANGE UP (ref 80–100)
METHOD TYPE: SIGNIFICANT CHANGE UP
ORGANISM # SPEC MICROSCOPIC CNT: ABNORMAL
ORGANISM # SPEC MICROSCOPIC CNT: SIGNIFICANT CHANGE UP
PLATELET # BLD AUTO: 323 K/UL — SIGNIFICANT CHANGE UP (ref 150–400)
POTASSIUM SERPL-MCNC: 3.8 MMOL/L — SIGNIFICANT CHANGE UP (ref 3.5–5.3)
POTASSIUM SERPL-SCNC: 3.8 MMOL/L — SIGNIFICANT CHANGE UP (ref 3.5–5.3)
RBC # BLD: 2.6 M/UL — LOW (ref 3.8–5.2)
RBC # FLD: 14.3 % — SIGNIFICANT CHANGE UP (ref 10.3–14.5)
SODIUM SERPL-SCNC: 133 MMOL/L — LOW (ref 135–145)
SPECIMEN SOURCE: SIGNIFICANT CHANGE UP
WBC # BLD: 5.27 K/UL — SIGNIFICANT CHANGE UP (ref 3.8–10.5)
WBC # FLD AUTO: 5.27 K/UL — SIGNIFICANT CHANGE UP (ref 3.8–10.5)

## 2024-10-21 PROCEDURE — 99232 SBSQ HOSP IP/OBS MODERATE 35: CPT

## 2024-10-21 PROCEDURE — 99233 SBSQ HOSP IP/OBS HIGH 50: CPT

## 2024-10-21 RX ORDER — AMPICILLIN 2 G/1
2 INJECTION, POWDER, FOR SOLUTION INTRAVENOUS EVERY 6 HOURS
Refills: 0 | Status: DISCONTINUED | OUTPATIENT
Start: 2024-10-21 | End: 2024-10-29

## 2024-10-21 RX ORDER — MINERAL OIL 471.99 G/472ML
133 OIL TOPICAL ONCE
Refills: 0 | Status: DISCONTINUED | OUTPATIENT
Start: 2024-10-21 | End: 2024-10-29

## 2024-10-21 RX ADMIN — PANTOPRAZOLE SODIUM 40 MILLIGRAM(S): 40 TABLET, DELAYED RELEASE ORAL at 06:00

## 2024-10-21 RX ADMIN — Medication 50 MILLIGRAM(S): at 05:59

## 2024-10-21 RX ADMIN — Medication 4 MILLIGRAM(S): at 22:40

## 2024-10-21 RX ADMIN — Medication 0.4 MILLIGRAM(S): at 21:50

## 2024-10-21 RX ADMIN — AMPICILLIN 200 GRAM(S): 2 INJECTION, POWDER, FOR SOLUTION INTRAVENOUS at 18:19

## 2024-10-21 RX ADMIN — Medication 4 MILLIGRAM(S): at 09:57

## 2024-10-21 RX ADMIN — Medication 4 MILLIGRAM(S): at 21:50

## 2024-10-21 RX ADMIN — Medication 5 MILLIGRAM(S): at 05:59

## 2024-10-21 RX ADMIN — Medication 975 MILLIGRAM(S): at 05:59

## 2024-10-21 RX ADMIN — Medication 4 MILLIGRAM(S): at 10:57

## 2024-10-21 RX ADMIN — AMPICILLIN 200 GRAM(S): 2 INJECTION, POWDER, FOR SOLUTION INTRAVENOUS at 12:16

## 2024-10-21 RX ADMIN — Medication 150 MILLIGRAM(S): at 12:16

## 2024-10-21 RX ADMIN — APIXABAN 2.5 MILLIGRAM(S): 5 TABLET, FILM COATED ORAL at 18:19

## 2024-10-21 RX ADMIN — Medication 2 TABLET(S): at 21:51

## 2024-10-21 RX ADMIN — Medication 975 MILLIGRAM(S): at 07:03

## 2024-10-21 RX ADMIN — APIXABAN 2.5 MILLIGRAM(S): 5 TABLET, FILM COATED ORAL at 05:59

## 2024-10-21 RX ADMIN — Medication 975 MILLIGRAM(S): at 21:50

## 2024-10-21 RX ADMIN — VANCOMYCIN HYDROCHLORIDE 250 MILLIGRAM(S): KIT at 06:04

## 2024-10-21 RX ADMIN — MEROPENEM 1000 MILLIGRAM(S): 1 INJECTION INTRAVENOUS at 05:59

## 2024-10-21 RX ADMIN — AMPICILLIN 200 GRAM(S): 2 INJECTION, POWDER, FOR SOLUTION INTRAVENOUS at 23:50

## 2024-10-21 NOTE — PROGRESS NOTE ADULT - SUBJECTIVE AND OBJECTIVE BOX
CC: Infected Right TKA s/p Washout     INTERVAL HPI/OVERNIGHT EVENTS: Carrera placed overnight for retention. Patient states has not had BM since prior to surgery. Taking opiates for pain. Denies fever, chills. nausea, vomiting.     Vital Signs Last 24 Hrs  T(C): 36.7 (21 Oct 2024 08:18), Max: 37 (20 Oct 2024 15:51)  T(F): 98.1 (21 Oct 2024 08:18), Max: 98.6 (20 Oct 2024 15:51)  HR: 99 (21 Oct 2024 08:18) (86 - 106)  BP: 105/63 (21 Oct 2024 08:18) (105/63 - 139/79)  BP(mean): --  RR: 18 (21 Oct 2024 08:18) (16 - 18)  SpO2: 97% (21 Oct 2024 08:18) (94% - 98%)    Parameters below as of 21 Oct 2024 08:18  Patient On (Oxygen Delivery Method): room air    PHYSICAL EXAM:    GENERAL: Elderly female, NAD  HEENT: PERRL, +EOMI  NECK: soft, Supple, No JVD  CHEST/LUNG: Clear to auscultate bilaterally; No wheezing  HEART: S1S2+, Regular rate and rhythm; No murmurs  ABDOMEN: Soft, Nontender, Nondistended; Bowel sounds present  EXTREMITIES:  Right LE: Knee dressing C/D/I, no drainage, no bleeding.  Right thigh donor skin graft site clean, dry. + 3 drains   SKIN: No rashes or lesions  NEURO: AAOX3  PSYCH: Calm and cooperative    I&O's Detail    20 Oct 2024 07:01  -  21 Oct 2024 07:00  --------------------------------------------------------  IN:  Total IN: 0 mL    OUT:    Bulb (mL): 6 mL    Bulb (mL): 20 mL    Bulb (mL): 70 mL    Indwelling Catheter - Urethral (mL): 1750 mL    Intermittent Catheterization - Urethral (mL): 550 mL  Total OUT: 2396 mL    Total NET: -2396 mL                                    8.1    5.27  )-----------( 323      ( 21 Oct 2024 09:10 )             25.5     21 Oct 2024 09:10    133    |  101    |  12.2   ----------------------------<  129    3.8     |  21.0   |  0.50     Ca    8.2        21 Oct 2024 09:10  Phos  2.8       19 Oct 2024 16:46  Mg     1.8       19 Oct 2024 16:46        CAPILLARY BLOOD GLUCOSE          Urinalysis Basic - ( 21 Oct 2024 09:10 )    Color: x / Appearance: x / SG: x / pH: x  Gluc: 129 mg/dL / Ketone: x  / Bili: x / Urobili: x   Blood: x / Protein: x / Nitrite: x   Leuk Esterase: x / RBC: x / WBC x   Sq Epi: x / Non Sq Epi: x / Bacteria: x        MEDICATIONS  (STANDING):  acetaminophen     Tablet .. 975 milliGRAM(s) Oral every 8 hours  amLODIPine   Tablet 5 milliGRAM(s) Oral daily  apixaban 2.5 milliGRAM(s) Oral two times a day  influenza  Vaccine (HIGH DOSE) 0.5 milliLiter(s) IntraMuscular once  metoprolol succinate ER 50 milliGRAM(s) Oral daily  pantoprazole    Tablet 40 milliGRAM(s) Oral before breakfast  polyethylene glycol 3350 17 Gram(s) Oral at bedtime  senna 2 Tablet(s) Oral at bedtime  tamsulosin 0.4 milliGRAM(s) Oral at bedtime  vancomycin  IVPB 750 milliGRAM(s) IV Intermittent every 24 hours  venlafaxine XR. 150 milliGRAM(s) Oral daily    MEDICATIONS  (PRN):  bisacodyl Suppository 10 milliGRAM(s) Rectal once PRN Constipation  HYDROmorphone   Tablet 4 milliGRAM(s) Oral every 3 hours PRN Severe Pain (7 - 10)  magnesium hydroxide Suspension 30 milliLiter(s) Oral daily PRN Constipation  ondansetron Injectable 4 milliGRAM(s) IV Push every 6 hours PRN Nausea and/or Vomiting  oxyCODONE    IR 5 milliGRAM(s) Oral every 3 hours PRN Mild Pain (1 - 3)  oxyCODONE    IR 10 milliGRAM(s) Oral every 3 hours PRN Moderate Pain (4 - 6)      RADIOLOGY & ADDITIONAL TESTS:   CC: Infected Right TKA s/p Washout     INTERVAL HPI/OVERNIGHT EVENTS: Carrera placed overnight for retention. Patient states has not had BM since prior to surgery. Taking opiates for pain. Denies fever, chills. nausea, vomiting.     Vital Signs Last 24 Hrs  T(C): 36.7 (21 Oct 2024 08:18), Max: 37 (20 Oct 2024 15:51)  T(F): 98.1 (21 Oct 2024 08:18), Max: 98.6 (20 Oct 2024 15:51)  HR: 99 (21 Oct 2024 08:18) (86 - 106)  BP: 105/63 (21 Oct 2024 08:18) (105/63 - 139/79)  RR: 18 (21 Oct 2024 08:18) (16 - 18)  SpO2: 97% (21 Oct 2024 08:18) (94% - 98%)    Parameters below as of 21 Oct 2024 08:18  Patient On (Oxygen Delivery Method): room air    PHYSICAL EXAM:    GENERAL: Elderly female, NAD  HEENT: PERRL, +EOMI  NECK: soft, Supple, No JVD  CHEST/LUNG: Clear to auscultate bilaterally; No wheezing  HEART: S1S2+, Regular rate and rhythm; No murmurs  ABDOMEN: Soft, Nontender, Nondistended; Bowel sounds present  EXTREMITIES:  Right LE: Knee dressing C/D/I, no drainage, no bleeding.  Right thigh donor skin graft site clean, dry, has drains   SKIN: No rashes or lesions  NEURO: AAOX3    I&O's Detail    20 Oct 2024 07:01  -  21 Oct 2024 07:00  --------------------------------------------------------  IN:  Total IN: 0 mL    OUT:    Bulb (mL): 6 mL    Bulb (mL): 20 mL    Bulb (mL): 70 mL    Indwelling Catheter - Urethral (mL): 1750 mL    Intermittent Catheterization - Urethral (mL): 550 mL  Total OUT: 2396 mL    Total NET: -2396 mL                                    8.1    5.27  )-----------( 323      ( 21 Oct 2024 09:10 )             25.5     21 Oct 2024 09:10    133    |  101    |  12.2   ----------------------------<  129    3.8     |  21.0   |  0.50     Ca    8.2        21 Oct 2024 09:10  Phos  2.8       19 Oct 2024 16:46  Mg     1.8       19 Oct 2024 16:46        CAPILLARY BLOOD GLUCOSE          Urinalysis Basic - ( 21 Oct 2024 09:10 )    Color: x / Appearance: x / SG: x / pH: x  Gluc: 129 mg/dL / Ketone: x  / Bili: x / Urobili: x   Blood: x / Protein: x / Nitrite: x   Leuk Esterase: x / RBC: x / WBC x   Sq Epi: x / Non Sq Epi: x / Bacteria: x        MEDICATIONS  (STANDING):  acetaminophen     Tablet .. 975 milliGRAM(s) Oral every 8 hours  amLODIPine   Tablet 5 milliGRAM(s) Oral daily  apixaban 2.5 milliGRAM(s) Oral two times a day  influenza  Vaccine (HIGH DOSE) 0.5 milliLiter(s) IntraMuscular once  metoprolol succinate ER 50 milliGRAM(s) Oral daily  pantoprazole    Tablet 40 milliGRAM(s) Oral before breakfast  polyethylene glycol 3350 17 Gram(s) Oral at bedtime  senna 2 Tablet(s) Oral at bedtime  tamsulosin 0.4 milliGRAM(s) Oral at bedtime  vancomycin  IVPB 750 milliGRAM(s) IV Intermittent every 24 hours  venlafaxine XR. 150 milliGRAM(s) Oral daily    MEDICATIONS  (PRN):  bisacodyl Suppository 10 milliGRAM(s) Rectal once PRN Constipation  HYDROmorphone   Tablet 4 milliGRAM(s) Oral every 3 hours PRN Severe Pain (7 - 10)  magnesium hydroxide Suspension 30 milliLiter(s) Oral daily PRN Constipation  ondansetron Injectable 4 milliGRAM(s) IV Push every 6 hours PRN Nausea and/or Vomiting  oxyCODONE    IR 5 milliGRAM(s) Oral every 3 hours PRN Mild Pain (1 - 3)  oxyCODONE    IR 10 milliGRAM(s) Oral every 3 hours PRN Moderate Pain (4 - 6)      RADIOLOGY & ADDITIONAL TESTS:

## 2024-10-21 NOTE — PROGRESS NOTE ADULT - ASSESSMENT
Patient is a 89y Female presenting to the emergency department with a chief complaint of right knee open wound with drainage.  Patient had a R TKA revision with extensor mechanism reconstruction, incision and drainage on 7/19/24 with Dr Johnston.  Patient was discharged with PICC line on cefepime.  Patient has been seeing her new ID doctor where she lives in [place] who currently has her on bactrim.  Patient states that there has been a small open wound sin e the surgery that has grown in size, it started draining and had a foul odor for the past 10 days.  Patient sent to ED by Dr Johnston.   Patient was be admitted to orthopedics service for surgery. Started on Meropenem and Daptomycin.    Infected Right TKA s/p Washout     - VS stable  - abx per ortho/ID   - encouraging incentive spirometry   -c/w local wound care per ortho   -DVT prophylaxis and Pain meds as per Ortho team   -PT/OT and weight bearing per ortho   -Vanc/ Meropenum   - follow cultures    Urinary retention/Constipation  Carrera placed. Would treat constipation and initiate trial of void after successful evacuation  Add Dulcolax suppository/Enema if no success. Consider bottle of magnesium citrate. Continue Kasey lax/senna, can increase to BID.     HTN/HLD  -Continue Amlodipine 5 mg daily  -Continue Metoprolol 50 mg BID  -will hold Valsartan 320 mg daily due to hyperkalemia   will continue BP meds with holding parameters   -Patient reports statin was recently d/jose by her doctor  -Continue Atorvastatin 20 mg qhs    Hx of Left Femoral Vein DVT  -Eliquis on hold for OR  -LE doppler negative for DVT  -would resume anticoagulation after surgery    Depression  -Continue Venlafaxine  mg daily.     Hx of C. Diff  -Continue with Dificid taper, currently 200 mg every other day.     chronic Anemia: her Hb is ~9, will continue to monitor CBC. Transfuse if symptomatic and continues to downtrend. Keep active Type and screen.     Hyponatremia: seems chronic, will continue to monitor BMP, if trending down will do fluid restriction and salt tabs as she looks euvolemic.     Hyperkalemia: potassium 5.4,  received  Lokelma, now 3.8, will monitor BMP. Consider  diet change to low potassium if uptrends Patient is a 89y Female presenting to the emergency department with a chief complaint of right knee open wound with drainage.  Patient had a R TKA revision with extensor mechanism reconstruction, incision and drainage on 7/19/24 with Dr Johnston.  Patient was discharged with PICC line on cefepime.  Patient has been seeing her new ID doctor where she lives in [place] who currently has her on bactrim.  Patient states that there has been a small open wound sin e the surgery that has grown in size, it started draining and had a foul odor for the past 10 days.  Patient sent to ED by Dr Johnston.   Patient was be admitted to orthopedics service for surgery. Started on Meropenem and Daptomycin.    Infected Right TKA s/p Washout     - VS stable  - abx per ortho/ID   - encouraging incentive spirometry   -c/w local wound care per ortho   -DVT prophylaxis and Pain meds as per Ortho team   -PT/OT and weight bearing per ortho   -Vanc/ Meropenum   - follow cultures    Urinary retention/Constipation  Carrera placed. Would treat constipation and initiate trial of void after successful evacuation  Add Dulcolax suppository/Enema if no success. Consider bottle of magnesium citrate. Continue Kasey lax/senna, can increase to BID.     HTN/HLD  -Continue Amlodipine 5 mg daily  -Continue Metoprolol 50 mg BID  -will hold Valsartan 320 mg daily due to hyperkalemia   will continue BP meds with holding parameters   -Patient reports statin was recently d/jose by her doctor  -Continue Atorvastatin 20 mg qhs    Hx of Left Femoral Vein DVT  -Eliquis on hold for OR  -LE doppler negative for DVT  -would resume anticoagulation after surgery    Depression  -Continue Venlafaxine  mg daily.     Hx of C. Diff  -Continue with Dificid taper, currently 200 mg every other day.     chronic Anemia: her Hb is ~9, will continue to monitor CBC. Transfuse if symptomatic and continues to downtrend. Keep active Type and screen.     Hyponatremia: seems chronic, will continue to monitor BMP, if trending down will do fluid restriction and salt tabs as she looks euvolemic.     Hyperkalemia: potassium 5.4,  received  Lokelma, now 3.8, will monitor BMP. Consider  diet change to low potassium if uptrends, will continue to hold Valsartan

## 2024-10-21 NOTE — PROGRESS NOTE ADULT - SUBJECTIVE AND OBJECTIVE BOX
TABITHA Windsor  [id number]    History: 89y Female is status post right knee revision w poly swap and I&D, patellectomy, gastroc flap and skin graft POD #3. Patient seen and eval at bedside. Patient is doing well and is comfortable. The patient's pain is controlled using the prescribed pain medications. The patient is participating in physical therapy. Denies nausea, vomiting, chest pain, shortness of breath, abdominal pain or fever. No new complaints.     Vital Signs Last 24 Hrs  T(C): 37 (21 Oct 2024 05:11), Max: 37.2 (20 Oct 2024 09:15)  T(F): 98.6 (21 Oct 2024 05:11), Max: 98.9 (20 Oct 2024 09:15)  HR: 106 (21 Oct 2024 05:11) (76 - 106)  BP: 139/68 (21 Oct 2024 05:11) (115/50 - 139/79)  BP(mean): --  RR: 18 (21 Oct 2024 05:11) (14 - 18)  SpO2: 96% (21 Oct 2024 05:11) (94% - 98%)    Parameters below as of 21 Oct 2024 05:11  Patient On (Oxygen Delivery Method): room air                              8.7    9.35  )-----------( 383      ( 20 Oct 2024 03:36 )             27.3     10-20    130[L]  |  100  |  20.6[H]  ----------------------------<  110[H]  5.4[H]   |  19.0[L]  |  0.61    Ca    8.5      20 Oct 2024 03:36  Phos  2.8     10-19  Mg     1.8     10-19    PE: NAD, alert, awake  Right LE:   Knee dressing C/D/I, no drainage, no bleeding.  Right thigh donor skin graft site clean, dry. + 3 drains under suction and tubing patent.   EHL/TA/FHL/GS intact, DP pulse 2+  Gross sensation to LT intact distally s/s/DP/SP/tib distrib, Calf soft, NT B/L      Culture - Tissue with Gram Stain (10.18.24 @ 15:15)    Gram Stain:   No polymorphonuclear cells seen per low power field  Few Gram positive cocci in pairs seen per oil power field   Specimen Source: Tissue   Culture Results:   Moderate Enterococcus faecalis          Plan:   DVT prophylaxis as prescribed Eliquis, including use of compression devices and ankle pumps  Continue physical therapy: NON - Weightbearing right LE - knee immobilizer at all times  Abx as per ID  awaiting final OR cultures  Incentive spirometry encouraged  Pain control as clinically indicated

## 2024-10-21 NOTE — PHARMACOTHERAPY INTERVENTION NOTE - COMMENTS
Reviewed with ID, cultures growing E faecalis - changed antibiotics to ampicillin renally adjusted 2g IV q6h.

## 2024-10-21 NOTE — PROGRESS NOTE ADULT - ASSESSMENT
89-year-old female underwent right total knee arthroplasty in  August 2022 at a facility in [place].  After the replacement patient had pain in the right knee.  She was seen by orthopedics during this time.  She lives between [place], [place], [place] and comes to [facility] during some summer months.  She was seen by her orthopedic surgeon in [place], [place] Dr. Garza and underwent revision in December 2023 at which time per the patient the orthopedist (Dr. Garza) did not notice anything abnormal about her knee replacement.  Patient again had another revision in [place] in February 2024.  Postoperatively since February patient was hospitalized in [place] and found to have MSSA bacteremia and was seen by infectious diseases in that hospitalization, underwent IV antibiotic treatment with cefazolin and Rifampin with anticipated end date of April 22, 2024.  She was discharged to a rehab facility and readmitted from the rehab for unclear reasons.  From some documentation the patient has patient had a drug rash because of rifampin and that was discontinued.  Cefazolin was switched to daptomycin.  Patient had another revision of the knee on March 27, 2024.  And was treated with another 6 weeks of antibiotics this time daptomycin which was completed on May 9.  Patient also had multiple echocardiograms last one on May 6 without any evidence of endocarditis.  After completion of intravenous antibiotics patient was placed on Bactrim for 1 month.  She left [place] and went to [place].  She was seen by her infectious disease physician Dr Lila Alvarado in [place], [place]. She followed up here in [place] with Dr Johnston on June 24, 2024 and Antibiotics was discontinued. Patient underwent right knee joint aspiration July 1, 2024 with 11k nucleated cell. Patient had another right knee aspiration on July 10 which the specimen was clotted and was not analyzed by the Lab. Patient was brought to the ER on July 17, 2024 and underwent another arthrocentesis with a cell fluid count of 32, 580 nucleated cells.  Was taken to the OR for Septic PJI Rt knee infection and s/p right TKA 7/19/24, Joint fluid cultures reporting Serratia marcescens, Joint fluid PCR reporting Serratia marcescens and was planned to be on Cefepime till 8/29/24, was discharged July 24, 2024 to a [facility] in [place] and followed with ID physician Dr Lila Alvarado in [place]. Patient was on Bactrim after the IV antibiotics managed by her ID doctor. She also developed C diff initially resolved with PO Vanco followed by 1st reoccurrence and treated with Dificid. Patient states that there has been a small open wound since the surgery that has grown in size, it started draining and had a foul odor for the past 10 days prior to presentation.  Patient sent to ED by Dr Johnston on 10/17.  Patient was be admitted to orthopedics service for surgery. Started on Meropenem and Daptomycin.      PJI  Surgical incision site wound  h/o C diff      - OR cultures reporting  Enterococcus faecalis  - Continue Vancomycin  - Monitor trough, will order next trough  - Monitor for Vancomycin toxicity   - Vancomycin required at this time pending culture results  - Monitor Cr while on Vancomycin, will order Cr for AM  - D/C  Meropenem  - Start Ampicillin 2gm IV [id number]   - monitor for diarrhea   - Will Check TTE   - Hold off on PICC/Midline for now unless needed for reasons other than infectious diseases  - Follow up cultures  - Trend Fever  - Trend WBC      Thank you for allowing me to participate in the care of your patient.   Will Follow    Discussed treatment plan with: Clinical pharmacy, Orthopedics and Dr Alvarado   89-year-old female underwent right total knee arthroplasty in  August 2022 at a facility in [place].  After the replacement patient had pain in the right knee.  She was seen by orthopedics during this time.  She lives between [place], [place], [place] and comes to [facility] during some summer months.  She was seen by her orthopedic surgeon in [place], [place] Dr. Garza and underwent revision in December 2023 at which time per the patient the orthopedist (Dr. Garza) did not notice anything abnormal about her knee replacement.  Patient again had another revision in [place] in February 2024.  Postoperatively since February patient was hospitalized in [place] and found to have MSSA bacteremia and was seen by infectious diseases in that hospitalization, underwent IV antibiotic treatment with cefazolin and Rifampin with anticipated end date of April 22, 2024.  She was discharged to a rehab facility and readmitted from the rehab for unclear reasons.  From some documentation the patient has patient had a drug rash because of rifampin and that was discontinued.  Cefazolin was switched to daptomycin.  Patient had another revision of the knee on March 27, 2024.  And was treated with another 6 weeks of antibiotics this time daptomycin which was completed on May 9.  Patient also had multiple echocardiograms last one on May 6 without any evidence of endocarditis.  After completion of intravenous antibiotics patient was placed on Bactrim for 1 month.  She left [place] and went to [place].  She was seen by her infectious disease physician Dr Lila Alvarado in [place], [place]. She followed up here in [place] with Dr Johnston on June 24, 2024 and Antibiotics was discontinued. Patient underwent right knee joint aspiration July 1, 2024 with 11k nucleated cell. Patient had another right knee aspiration on July 10 which the specimen was clotted and was not analyzed by the Lab. Patient was brought to the ER on July 17, 2024 and underwent another arthrocentesis with a cell fluid count of 32, 580 nucleated cells.  Was taken to the OR for Septic PJI Rt knee infection and s/p right TKA 7/19/24, Joint fluid cultures reporting Serratia marcescens, Joint fluid PCR reporting Serratia marcescens and was planned to be on Cefepime till 8/29/24, was discharged July 24, 2024 to a [facility] in [place] and followed with ID physician Dr Lila Alvarado in [place]. Patient was on Bactrim after the IV antibiotics managed by her ID doctor. She also developed C diff initially resolved with PO Vanco followed by 1st reoccurrence and treated with Dificid. Patient states that there has been a small open wound since the surgery that has grown in size, it started draining and had a foul odor for the past 10 days prior to presentation.  Patient sent to ED by Dr Johnston on 10/17.  Patient was be admitted to orthopedics service for surgery. Started on Meropenem and Daptomycin.      PJI  Surgical incision site wound  h/o C diff  s/p right knee I&D with poly exchange, patellectomy by Dr. Johnston & gastroc flap and skin graft  10/18/24      - OR cultures reporting  Enterococcus faecalis  - Continue Vancomycin  - Monitor trough, will order next trough  - Monitor for Vancomycin toxicity   - Vancomycin required at this time pending culture results  - Monitor Cr while on Vancomycin, will order Cr for AM  - D/C  Meropenem  - Start Ampicillin 2gm IV [id number]   - monitor for diarrhea   - Will Check TTE   - Hold off on PICC/Midline for now unless needed for reasons other than infectious diseases  - Follow up cultures  - Trend Fever  - Trend WBC      Thank you for allowing me to participate in the care of your patient.   Will Follow    Discussed treatment plan with: Clinical pharmacy, Orthopedics and Dr Alvarado

## 2024-10-21 NOTE — PROGRESS NOTE ADULT - SUBJECTIVE AND OBJECTIVE BOX
[facility]  INFECTIOUS DISEASES at [place] / [place] / [place]  =======================================================                              Ady Zheng MD                              Professor Emeritus:  Dr Terrence Gong MD            Diplomates American Board of Internal Medicine & Infectious Diseases                                   Tel  [phone] Fax [phone]                                  Hospital Consult line:  [phone]  =======================================================      TABITHA MENDENHALL [id number]    Follow up:      Allergies:  Demerol (Unknown)  fluoroquinolone antibiotics (Other)       REVIEW OF SYSTEMS:  CONSTITUTIONAL:  No Fever or chills  HEENT:  No diplopia or blurred vision.  No earache, sore throat or runny nose.  CARDIOVASCULAR:  No chest pain  RESPIRATORY:  No cough, shortness of breath  GASTROINTESTINAL:  No nausea, vomiting or diarrhea.  GENITOURINARY:  No dysuria, frequency or urgency. No Blood in urine  MUSCULOSKELETAL:  no joint aches, no muscle pain  SKIN:  No change in skin, hair or nails.  NEUROLOGIC:  No Headaches      Physical Exam:  GEN: NAD  HEENT: normocephalic and atraumatic. EOMI. PERRL.    NECK: Supple.   LUNGS: CTA B/L.  HEART: RRR  ABDOMEN: Soft, NT, ND.  +BS.    : No CVA tenderness  EXTREMITIES: Without  edema.  MSK: No joint swelling  NEUROLOGIC: No Focal Deficits   SKIN: No rash      Vitals:  T(F): 98.1 (21 Oct 2024 08:18), Max: 98.6 (20 Oct 2024 15:51)  HR: 99 (21 Oct 2024 08:18)  BP: 105/63 (21 Oct 2024 08:18)  RR: 18 (21 Oct 2024 08:18)  SpO2: 97% (21 Oct 2024 08:18) (94% - 98%)  temp max in last 48H T(F): , Max: 98.9 (10-20-24 @ 09:15)      Current Antibiotics:  vancomycin  IVPB 750 milliGRAM(s) IV Intermittent every 24 hours    Other medications:  acetaminophen     Tablet .. 975 milliGRAM(s) Oral every 8 hours  amLODIPine   Tablet 5 milliGRAM(s) Oral daily  apixaban 2.5 milliGRAM(s) Oral two times a day  influenza  Vaccine (HIGH DOSE) 0.5 milliLiter(s) IntraMuscular once  metoprolol succinate ER 50 milliGRAM(s) Oral daily  pantoprazole    Tablet 40 milliGRAM(s) Oral before breakfast  polyethylene glycol 3350 17 Gram(s) Oral at bedtime  senna 2 Tablet(s) Oral at bedtime  tamsulosin 0.4 milliGRAM(s) Oral at bedtime  venlafaxine XR. 150 milliGRAM(s) Oral daily                            8.1    5.27  )-----------( 323      ( 21 Oct 2024 09:10 )             25.5     10-21    133[L]  |  101  |  12.2  ----------------------------<  129[H]  3.8   |  21.0[L]  |  0.50    Ca    8.2[L]      21 Oct 2024 09:10  Phos  2.8     10-19  Mg     1.8     10-19      RECENT CULTURES:  10-18 @ 15:15 Tissue     Moderate Enterococcus faecalis  No polymorphonuclear cells seen per low power field  Few-moderate Gram positive cocci in pairs seen per oil power field      WBC Count: 5.27 K/uL (10-21-24 @ 09:10)  WBC Count: 9.35 K/uL (10-20-24 @ 03:36)  WBC Count: 8.59 K/uL (10-19-24 @ 05:24)  WBC Count: 9.37 K/uL (10-18-24 @ 18:35)  WBC Count: 6.58 K/uL (10-17-24 @ 18:11)    Creatinine: 0.50 mg/dL (10-21-24 @ 09:10)  Creatinine: 0.61 mg/dL (10-20-24 @ 03:36)  Creatinine: 0.70 mg/dL (10-19-24 @ 16:46)  Creatinine: 0.65 mg/dL (10-19-24 @ 09:12)  Creatinine: 0.61 mg/dL (10-19-24 @ 05:24)  Creatinine: 0.64 mg/dL (10-19-24 @ 05:24)  Creatinine: 0.67 mg/dL (10-18-24 @ 18:35)  Creatinine: 0.70 mg/dL (10-18-24 @ 05:27)  Creatinine: 1.04 mg/dL (10-17-24 @ 18:11)    Vancomycin Level, Random: 10.6 ug/mL (10-20-24 @ 03:36)  Vancomycin Level, Random: 9.1 ug/mL (10-19-24 @ 05:24)     [facility]  INFECTIOUS DISEASES at [place] / [place] / [place]  =======================================================                              Ady Zheng MD                              Professor Emeritus:  Dr Terrence Gong MD            Diplomates American Board of Internal Medicine & Infectious Diseases                                   Tel  [phone] Fax [phone]                                  Hospital Consult line:  [phone]  =======================================================      TABITHA MENDENHALL [id number]    Follow up: PJI    No fevers     s/p right knee I&D with poly exchange, patellectomy by Dr. Johnston & gastroc flap and skin graft  10/18/24      Allergies:  Demerol (Unknown)  fluoroquinolone antibiotics (Other)       REVIEW OF SYSTEMS:  CONSTITUTIONAL:  No Fever or chills  HEENT:  No diplopia or blurred vision.  No earache, sore throat or runny nose.  CARDIOVASCULAR:  No chest pain  RESPIRATORY:  No cough, shortness of breath  GASTROINTESTINAL:  No nausea, vomiting or diarrhea.  GENITOURINARY:  No dysuria, frequency or urgency. No Blood in urine  MUSCULOSKELETAL:  no joint aches, no muscle pain  SKIN:  No change in skin, hair or nails.  NEUROLOGIC:  No Headaches      Physical Exam:  GEN: NAD  HEENT: normocephalic and atraumatic. EOMI. PERRL.    NECK: Supple.   LUNGS: CTA B/L.  HEART: RRR  ABDOMEN: Soft, NT, ND.  +BS.    : No CVA tenderness  EXTREMITIES: Without  edema.  MSK: No joint swelling  NEUROLOGIC: No Focal Deficits   SKIN: No rash      Vitals:  T(F): 98.1 (21 Oct 2024 08:18), Max: 98.6 (20 Oct 2024 15:51)  HR: 99 (21 Oct 2024 08:18)  BP: 105/63 (21 Oct 2024 08:18)  RR: 18 (21 Oct 2024 08:18)  SpO2: 97% (21 Oct 2024 08:18) (94% - 98%)  temp max in last 48H T(F): , Max: 98.9 (10-20-24 @ 09:15)      Current Antibiotics:  vancomycin  IVPB 750 milliGRAM(s) IV Intermittent every 24 hours    Other medications:  acetaminophen     Tablet .. 975 milliGRAM(s) Oral every 8 hours  amLODIPine   Tablet 5 milliGRAM(s) Oral daily  apixaban 2.5 milliGRAM(s) Oral two times a day  influenza  Vaccine (HIGH DOSE) 0.5 milliLiter(s) IntraMuscular once  metoprolol succinate ER 50 milliGRAM(s) Oral daily  pantoprazole    Tablet 40 milliGRAM(s) Oral before breakfast  polyethylene glycol 3350 17 Gram(s) Oral at bedtime  senna 2 Tablet(s) Oral at bedtime  tamsulosin 0.4 milliGRAM(s) Oral at bedtime  venlafaxine XR. 150 milliGRAM(s) Oral daily                            8.1    5.27  )-----------( 323      ( 21 Oct 2024 09:10 )             25.5     10-21    133[L]  |  101  |  12.2  ----------------------------<  129[H]  3.8   |  21.0[L]  |  0.50    Ca    8.2[L]      21 Oct 2024 09:10  Phos  2.8     10-19  Mg     1.8     10-19      RECENT CULTURES:  10-18 @ 15:15 Tissue     Moderate Enterococcus faecalis  No polymorphonuclear cells seen per low power field  Few-moderate Gram positive cocci in pairs seen per oil power field      WBC Count: 5.27 K/uL (10-21-24 @ 09:10)  WBC Count: 9.35 K/uL (10-20-24 @ 03:36)  WBC Count: 8.59 K/uL (10-19-24 @ 05:24)  WBC Count: 9.37 K/uL (10-18-24 @ 18:35)  WBC Count: 6.58 K/uL (10-17-24 @ 18:11)    Creatinine: 0.50 mg/dL (10-21-24 @ 09:10)  Creatinine: 0.61 mg/dL (10-20-24 @ 03:36)  Creatinine: 0.70 mg/dL (10-19-24 @ 16:46)  Creatinine: 0.65 mg/dL (10-19-24 @ 09:12)  Creatinine: 0.61 mg/dL (10-19-24 @ 05:24)  Creatinine: 0.64 mg/dL (10-19-24 @ 05:24)  Creatinine: 0.67 mg/dL (10-18-24 @ 18:35)  Creatinine: 0.70 mg/dL (10-18-24 @ 05:27)  Creatinine: 1.04 mg/dL (10-17-24 @ 18:11)    Vancomycin Level, Random: 10.6 ug/mL (10-20-24 @ 03:36)  Vancomycin Level, Random: 9.1 ug/mL (10-19-24 @ 05:24)

## 2024-10-22 ENCOUNTER — RESULT REVIEW (OUTPATIENT)
Age: 89
End: 2024-10-22

## 2024-10-22 LAB
ANION GAP SERPL CALC-SCNC: 10 MMOL/L — SIGNIFICANT CHANGE UP (ref 5–17)
BLD GP AB SCN SERPL QL: SIGNIFICANT CHANGE UP
BUN SERPL-MCNC: 9.2 MG/DL — SIGNIFICANT CHANGE UP (ref 8–20)
CALCIUM SERPL-MCNC: 8.2 MG/DL — LOW (ref 8.4–10.5)
CHLORIDE SERPL-SCNC: 100 MMOL/L — SIGNIFICANT CHANGE UP (ref 96–108)
CO2 SERPL-SCNC: 23 MMOL/L — SIGNIFICANT CHANGE UP (ref 22–29)
CREAT SERPL-MCNC: 0.44 MG/DL — LOW (ref 0.5–1.3)
DAT C3-SP REAG RBC QL: SIGNIFICANT CHANGE UP
DAT IGG-SP REAG RBC-IMP: ABNORMAL
DIR ANTIGLOB POLYSPECIFIC INTERPRETATION: ABNORMAL
EGFR: 93 ML/MIN/1.73M2 — SIGNIFICANT CHANGE UP
GLUCOSE SERPL-MCNC: 79 MG/DL — SIGNIFICANT CHANGE UP (ref 70–99)
HCT VFR BLD CALC: 23.6 % — LOW (ref 34.5–45)
HGB BLD-MCNC: 7.5 G/DL — LOW (ref 11.5–15.5)
MAGNESIUM SERPL-MCNC: 1.7 MG/DL — SIGNIFICANT CHANGE UP (ref 1.6–2.6)
MCHC RBC-ENTMCNC: 31.1 PG — SIGNIFICANT CHANGE UP (ref 27–34)
MCHC RBC-ENTMCNC: 31.8 GM/DL — LOW (ref 32–36)
MCV RBC AUTO: 97.9 FL — SIGNIFICANT CHANGE UP (ref 80–100)
PHOSPHATE SERPL-MCNC: 2.6 MG/DL — SIGNIFICANT CHANGE UP (ref 2.4–4.7)
PLATELET # BLD AUTO: 363 K/UL — SIGNIFICANT CHANGE UP (ref 150–400)
POTASSIUM SERPL-MCNC: 4.7 MMOL/L — SIGNIFICANT CHANGE UP (ref 3.5–5.3)
POTASSIUM SERPL-SCNC: 4.7 MMOL/L — SIGNIFICANT CHANGE UP (ref 3.5–5.3)
RBC # BLD: 2.41 M/UL — LOW (ref 3.8–5.2)
RBC # FLD: 14.3 % — SIGNIFICANT CHANGE UP (ref 10.3–14.5)
SODIUM SERPL-SCNC: 133 MMOL/L — LOW (ref 135–145)
WBC # BLD: 4.66 K/UL — SIGNIFICANT CHANGE UP (ref 3.8–10.5)
WBC # FLD AUTO: 4.66 K/UL — SIGNIFICANT CHANGE UP (ref 3.8–10.5)

## 2024-10-22 PROCEDURE — 99232 SBSQ HOSP IP/OBS MODERATE 35: CPT

## 2024-10-22 PROCEDURE — 99233 SBSQ HOSP IP/OBS HIGH 50: CPT

## 2024-10-22 PROCEDURE — 93306 TTE W/DOPPLER COMPLETE: CPT | Mod: 26

## 2024-10-22 PROCEDURE — 74177 CT ABD & PELVIS W/CONTRAST: CPT | Mod: 26

## 2024-10-22 RX ORDER — METRONIDAZOLE 250 MG/1
500 TABLET ORAL EVERY 12 HOURS
Refills: 0 | Status: DISCONTINUED | OUTPATIENT
Start: 2024-10-22 | End: 2024-10-29

## 2024-10-22 RX ORDER — KETOROLAC TROMETHAMINE 30 MG/ML
15 INJECTION INTRAMUSCULAR; INTRAVENOUS ONCE
Refills: 0 | Status: DISCONTINUED | OUTPATIENT
Start: 2024-10-22 | End: 2024-10-22

## 2024-10-22 RX ADMIN — Medication 50 MILLIGRAM(S): at 05:20

## 2024-10-22 RX ADMIN — AMPICILLIN 200 GRAM(S): 2 INJECTION, POWDER, FOR SOLUTION INTRAVENOUS at 05:18

## 2024-10-22 RX ADMIN — APIXABAN 2.5 MILLIGRAM(S): 5 TABLET, FILM COATED ORAL at 05:20

## 2024-10-22 RX ADMIN — Medication 975 MILLIGRAM(S): at 05:19

## 2024-10-22 RX ADMIN — PANTOPRAZOLE SODIUM 40 MILLIGRAM(S): 40 TABLET, DELAYED RELEASE ORAL at 05:19

## 2024-10-22 RX ADMIN — AMPICILLIN 200 GRAM(S): 2 INJECTION, POWDER, FOR SOLUTION INTRAVENOUS at 21:24

## 2024-10-22 RX ADMIN — KETOROLAC TROMETHAMINE 15 MILLIGRAM(S): 30 INJECTION INTRAMUSCULAR; INTRAVENOUS at 14:13

## 2024-10-22 RX ADMIN — Medication 975 MILLIGRAM(S): at 21:29

## 2024-10-22 RX ADMIN — Medication 975 MILLIGRAM(S): at 14:13

## 2024-10-22 RX ADMIN — METRONIDAZOLE 500 MILLIGRAM(S): 250 TABLET ORAL at 10:00

## 2024-10-22 RX ADMIN — Medication 150 MILLIGRAM(S): at 14:12

## 2024-10-22 RX ADMIN — Medication 0.4 MILLIGRAM(S): at 21:24

## 2024-10-22 RX ADMIN — AMPICILLIN 200 GRAM(S): 2 INJECTION, POWDER, FOR SOLUTION INTRAVENOUS at 15:33

## 2024-10-22 RX ADMIN — Medication 975 MILLIGRAM(S): at 21:24

## 2024-10-22 RX ADMIN — APIXABAN 2.5 MILLIGRAM(S): 5 TABLET, FILM COATED ORAL at 17:57

## 2024-10-22 RX ADMIN — METRONIDAZOLE 500 MILLIGRAM(S): 250 TABLET ORAL at 17:57

## 2024-10-22 RX ADMIN — Medication 5 MILLIGRAM(S): at 05:20

## 2024-10-22 NOTE — PROGRESS NOTE ADULT - SUBJECTIVE AND OBJECTIVE BOX
Patient seen and eval at bedside. Patient has no complaints. Patient would like to know what her plan is. Patient denies CP, SOB, dizziness, numbness tingling.    Vital Signs Last 24 Hrs  T(C): 37.1 (22 Oct 2024 04:33), Max: 37.1 (21 Oct 2024 21:45)  T(F): 98.7 (22 Oct 2024 04:33), Max: 98.8 (21 Oct 2024 21:45)  HR: 97 (22 Oct 2024 04:33) (97 - 109)  BP: 122/71 (22 Oct 2024 04:33) (105/63 - 136/69)  BP(mean): --  RR: 18 (22 Oct 2024 04:33) (16 - 18)  SpO2: 94% (22 Oct 2024 04:33) (94% - 97%)    PE: NAD, alert awake  Right LE: KI in place, Dressing C/D/I KB drains x 3 intact  EHL/TA/GS/FHL intact  Gross SILT s/s/DP/SP/tib distrib  DP pulse 2+, calf soft. NT B/L                          7.5    4.66  )-----------( 363      ( 22 Oct 2024 05:22 )             23.6     10-22    133[L]  |  100  |  9.2  ----------------------------<  79  4.7   |  23.0  |  0.44[L]    Culture - Tissue with Gram Stain (10.18.24 @ 15:15)    Gram Stain:   No polymorphonuclear cells seen per low power field  Few Gram positive cocci in pairs seen per oil power field   -  Ampicillin: S <=2 Predicts results to ampicillin/sulbactam, amoxacillin-clavulanate and  piperacillin-tazobactam.   -  Vancomycin: S 2   Specimen Source: Tissue   Culture Results:   Moderate Enterococcus faecalis  Numerous Finegoldia magna "Susceptibilities not performed"   Organism Identification: Enterococcus faecalis   Organism: Enterococcus faecalis   Method Type: SACHIN    Culture - Tissue with Gram Stain (10.18.24 @ 15:15)    -  Ampicillin: S <=2 Predicts results to ampicillin/sulbactam, amoxacillin-clavulanate and  piperacillin-tazobactam.   Gram Stain:   No polymorphonuclear cells seen seen per low power field  Few Gram positive cocci in pairs seen per oil power field   -  Vancomycin: S 2   Specimen Source: Tissue   Culture Results:   Moderate Enterococcus faecalis  Rare Prevotella disiens "Susceptibilities not performed"  Moderate Finegoldia magna "Susceptibilities not performed"   Organism Identification: Enterococcus faecalis   Organism: Enterococcus faecalis   Method Type: SACHIN    A/P: s/p right TKA revision poly exchange, patellectomy, gastroc flap and skin graft for right septic TKA POD#4  ·	Pain control  ·	DVT propx: eliquis  ·	Abx as per ID  ·	PT - WBAT in KI  ·	PICC line pending  ·	Med following     Patient seen and eval at bedside. Patient has no complaints. Patient would like to know what her plan is. Patient denies CP, SOB, dizziness, numbness tingling.    Vital Signs Last 24 Hrs  T(C): 37.1 (22 Oct 2024 04:33), Max: 37.1 (21 Oct 2024 21:45)  T(F): 98.7 (22 Oct 2024 04:33), Max: 98.8 (21 Oct 2024 21:45)  HR: 97 (22 Oct 2024 04:33) (97 - 109)  BP: 122/71 (22 Oct 2024 04:33) (105/63 - 136/69)  BP(mean): --  RR: 18 (22 Oct 2024 04:33) (16 - 18)  SpO2: 94% (22 Oct 2024 04:33) (94% - 97%)    PE: NAD, alert awake  Right LE: KI in place, Dressing C/D/I KB drains x 3 intact  EHL/TA/GS/FHL intact  Gross SILT s/s/DP/SP/tib distrib  DP pulse 2+, calf soft. NT B/L                          7.5    4.66  )-----------( 363      ( 22 Oct 2024 05:22 )             23.6     10-22    133[L]  |  100  |  9.2  ----------------------------<  79  4.7   |  23.0  |  0.44[L]    Culture - Tissue with Gram Stain (10.18.24 @ 15:15)    Gram Stain:   No polymorphonuclear cells seen per low power field  Few Gram positive cocci in pairs seen per oil power field   -  Ampicillin: S <=2 Predicts results to ampicillin/sulbactam, amoxacillin-clavulanate and  piperacillin-tazobactam.   -  Vancomycin: S 2   Specimen Source: Tissue   Culture Results:   Moderate Enterococcus faecalis  Numerous Finegoldia magna "Susceptibilities not performed"   Organism Identification: Enterococcus faecalis   Organism: Enterococcus faecalis   Method Type: SACHIN    Culture - Tissue with Gram Stain (10.18.24 @ 15:15)    -  Ampicillin: S <=2 Predicts results to ampicillin/sulbactam, amoxacillin-clavulanate and  piperacillin-tazobactam.   Gram Stain:   No polymorphonuclear cells seen seen per low power field  Few Gram positive cocci in pairs seen per oil power field   -  Vancomycin: S 2   Specimen Source: Tissue   Culture Results:   Moderate Enterococcus faecalis  Rare Prevotella disiens "Susceptibilities not performed"  Moderate Finegoldia magna "Susceptibilities not performed"   Organism Identification: Enterococcus faecalis   Organism: Enterococcus faecalis   Method Type: SACHIN    A/P: s/p right TKA revision poly exchange, patellectomy, gastroc flap and skin graft for right septic TKA POD#4  ·	Pain control  ·	DVT propx: eliquis  ·	Abx as per ID  ·	PT - NWB in KI right LE  ·	PICC line pending  ·	Med following

## 2024-10-22 NOTE — PROGRESS NOTE ADULT - ASSESSMENT
89-year-old female underwent right total knee arthroplasty in  August 2022 at a facility in [place].  After the replacement patient had pain in the right knee.  She was seen by orthopedics during this time.  She lives between [place], [place], [place] and comes to [facility] during some summer months.  She was seen by her orthopedic surgeon in [place], [place] Dr. Garza and underwent revision in December 2023 at which time per the patient the orthopedist (Dr. Garza) did not notice anything abnormal about her knee replacement.  Patient again had another revision in [place] in February 2024.  Postoperatively since February patient was hospitalized in [place] and found to have MSSA bacteremia and was seen by infectious diseases in that hospitalization, underwent IV antibiotic treatment with cefazolin and Rifampin with anticipated end date of April 22, 2024.  She was discharged to a rehab facility and readmitted from the rehab for unclear reasons.  From some documentation the patient has patient had a drug rash because of rifampin and that was discontinued.  Cefazolin was switched to daptomycin.  Patient had another revision of the knee on March 27, 2024.  And was treated with another 6 weeks of antibiotics this time daptomycin which was completed on May 9.  Patient also had multiple echocardiograms last one on May 6 without any evidence of endocarditis.  After completion of intravenous antibiotics patient was placed on Bactrim for 1 month.  She left [place] and went to [place].  She was seen by her infectious disease physician Dr Lila Alvarado in [place], [place]. She followed up here in [place] with Dr Johnston on June 24, 2024 and Antibiotics was discontinued. Patient underwent right knee joint aspiration July 1, 2024 with 11k nucleated cell. Patient had another right knee aspiration on July 10 which the specimen was clotted and was not analyzed by the Lab. Patient was brought to the ER on July 17, 2024 and underwent another arthrocentesis with a cell fluid count of 32, 580 nucleated cells.  Was taken to the OR for Septic PJI Rt knee infection and s/p right TKA 7/19/24, Joint fluid cultures reporting Serratia marcescens, Joint fluid PCR reporting Serratia marcescens and was planned to be on Cefepime till 8/29/24, was discharged July 24, 2024 to a [facility] in [place] and followed with ID physician Dr Lila Alvarado in [place]. Patient was on Bactrim after the IV antibiotics managed by her ID doctor. She also developed C diff initially resolved with PO Vanco followed by 1st reoccurrence and treated with Dificid. Patient states that there has been a small open wound since the surgery that has grown in size, it started draining and had a foul odor for the past 10 days prior to presentation.  Patient sent to ED by Dr Johnston on 10/17.  Patient was be admitted to orthopedics service for surgery. Started on Meropenem and Daptomycin.      PJI  Surgical incision site wound  h/o C diff  s/p right knee I&D with poly exchange, patellectomy by Dr. Johnston & gastroc flap and skin graft  10/18/24      - OR cultures reporting  Enterococcus faecalis, Prevotella disiens and Finegoldia magna  - Continue Vancomycin  - Monitor trough, will order next trough  - Monitor for Vancomycin toxicity   - Vancomycin required at this time pending culture results  - Monitor Cr while on Vancomycin, will order Cr for AM  - Start Metronidazole 500mg PO q 12hours   - Continue Ampicillin 2gm IV [id number]   - monitor for diarrhea   - TTE ordered  - Given OR culture data, will order CT A/P with oral and IV contrast   - Hold off on PICC/Midline for now unless needed for reasons other than infectious diseases  - Follow up cultures  - Trend Fever  - Trend WBC      Thank you for allowing me to participate in the care of your patient.   Will Follow    Discussed treatment plan with: medical team, Clinical pharmacy, Orthopedics and Dr Alvarado

## 2024-10-22 NOTE — PROGRESS NOTE ADULT - SUBJECTIVE AND OBJECTIVE BOX
[facility]  INFECTIOUS DISEASES at [place] / [place] / [place]  =======================================================                              Ady Zheng MD                              Professor Emeritus:  Dr Terrence Gong MD            Diplomates American Board of Internal Medicine & Infectious Diseases                                   Tel  [phone] Fax [phone]                                  Hospital Consult line:  [phone]  =======================================================      TABITHA MENDENHALL [id number]    Follow up: PJI    No fevers     s/p right knee I&D with poly exchange, patellectomy by Dr. Johnston & gastroc flap and skin graft  10/18/24      Allergies:  Demerol (Unknown)  fluoroquinolone antibiotics (Other)       REVIEW OF SYSTEMS:  CONSTITUTIONAL:  No Fever or chills  HEENT:  No diplopia or blurred vision.  No earache, sore throat or runny nose.  CARDIOVASCULAR:  No chest pain  RESPIRATORY:  No cough, shortness of breath  GASTROINTESTINAL:  No nausea, vomiting or diarrhea.  GENITOURINARY:  No dysuria, frequency or urgency. No Blood in urine  MUSCULOSKELETAL:  no joint aches, no muscle pain  SKIN:  No change in skin, hair or nails.  NEUROLOGIC:  No Headaches      Physical Exam:  GEN: NAD  HEENT: normocephalic and atraumatic. EOMI. PERRL.    NECK: Supple.   LUNGS: CTA B/L.  HEART: RRR  ABDOMEN: Soft, NT, ND.  +BS.    : No CVA tenderness  EXTREMITIES: Without  edema.  MSK: No joint swelling  NEUROLOGIC: No Focal Deficits   SKIN: No rash      Vitals:  T(F): 98.7 (22 Oct 2024 04:33), Max: 98.8 (21 Oct 2024 21:45)  HR: 97 (22 Oct 2024 04:33)  BP: 122/71 (22 Oct 2024 04:33)  RR: 18 (22 Oct 2024 04:33)  SpO2: 94% (22 Oct 2024 04:33) (94% - 97%)  temp max in last 48H T(F): , Max: 98.8 (10-21-24 @ 21:45)    Current Antibiotics:  ampicillin  IVPB 2 Gram(s) IV Intermittent every 6 hours  metroNIDAZOLE    Tablet 500 milliGRAM(s) Oral every 12 hours    Other medications:  acetaminophen     Tablet .. 975 milliGRAM(s) Oral every 8 hours  amLODIPine   Tablet 5 milliGRAM(s) Oral daily  apixaban 2.5 milliGRAM(s) Oral two times a day  influenza  Vaccine (HIGH DOSE) 0.5 milliLiter(s) IntraMuscular once  metoprolol succinate ER 50 milliGRAM(s) Oral daily  pantoprazole    Tablet 40 milliGRAM(s) Oral before breakfast  polyethylene glycol 3350 17 Gram(s) Oral at bedtime  senna 2 Tablet(s) Oral at bedtime  tamsulosin 0.4 milliGRAM(s) Oral at bedtime  venlafaxine XR. 150 milliGRAM(s) Oral daily                            7.5    4.66  )-----------( 363      ( 22 Oct 2024 05:22 )             23.6     10-22    133[L]  |  100  |  9.2  ----------------------------<  79  4.7   |  23.0  |  0.44[L]    Ca    8.2[L]      22 Oct 2024 05:22  Phos  2.6     10-22  Mg     1.7     10-22      RECENT CULTURES:  10-18 @ 15:15 Tissue Enterococcus faecalis    Moderate Enterococcus faecalis  Numerous Finegoldia magna "Susceptibilities not performed"  Rare Prevotella disiens "Susceptibilities not performed"  No polymorphonuclear cells seen per low power field  Few-moderate Gram positive cocci in pairs seen per oil power field      WBC Count: 4.66 K/uL (10-22-24 @ 05:22)  WBC Count: 5.27 K/uL (10-21-24 @ 09:10)  WBC Count: 9.35 K/uL (10-20-24 @ 03:36)  WBC Count: 8.59 K/uL (10-19-24 @ 05:24)  WBC Count: 9.37 K/uL (10-18-24 @ 18:35)  WBC Count: 6.58 K/uL (10-17-24 @ 18:11)    Creatinine: 0.44 mg/dL (10-22-24 @ 05:22)  Creatinine: 0.50 mg/dL (10-21-24 @ 09:10)  Creatinine: 0.61 mg/dL (10-20-24 @ 03:36)  Creatinine: 0.70 mg/dL (10-19-24 @ 16:46)  Creatinine: 0.65 mg/dL (10-19-24 @ 09:12)  Creatinine: 0.61 mg/dL (10-19-24 @ 05:24)  Creatinine: 0.64 mg/dL (10-19-24 @ 05:24)  Creatinine: 0.67 mg/dL (10-18-24 @ 18:35)  Creatinine: 0.70 mg/dL (10-18-24 @ 05:27)  Creatinine: 1.04 mg/dL (10-17-24 @ 18:11)    Vancomycin Level, Random: 10.6 ug/mL (10-20-24 @ 03:36)

## 2024-10-22 NOTE — PROGRESS NOTE ADULT - SUBJECTIVE AND OBJECTIVE BOX
CC:  Infected Right TKA s/p Washout       INTERVAL HPI/OVERNIGHT EVENTS: Sitting up in bed. Agrees to blood transfusion. Reports BM yesterday. Waiting for PT. Denies chest pain, SOB, dizziness, nausea, vomiting, fever, chills.     Vital Signs Last 24 Hrs  T(C): 36.8 (22 Oct 2024 08:50), Max: 37.1 (21 Oct 2024 21:45)  T(F): 98.2 (22 Oct 2024 08:50), Max: 98.8 (21 Oct 2024 21:45)  HR: 96 (22 Oct 2024 08:50) (96 - 109)  BP: 137/69 (22 Oct 2024 08:50) (122/71 - 137/69)  BP(mean): --  RR: 17 (22 Oct 2024 08:50) (16 - 18)  SpO2: 93% (22 Oct 2024 08:50) (93% - 97%)    Parameters below as of 22 Oct 2024 08:50  Patient On (Oxygen Delivery Method): room air      PHYSICAL EXAM:    GENERAL: Elderly female, NAD  HEENT: PERRL, +EOMI  NECK: soft, Supple, No JVD  CHEST/LUNG: Clear to auscultate bilaterally; No wheezing  HEART: S1S2+, Regular rate and rhythm; No murmurs  ABDOMEN: Soft, Nontender, Nondistended; Bowel sounds present  EXTREMITIES:  Right LE: Knee dressing C/D/I, no drainage, no bleeding.  Right thigh donor skin graft site clean, dry, has drains   SKIN: No rashes or lesions  NEURO: AAOX3  I&O's Detail    21 Oct 2024 07:01  -  22 Oct 2024 07:00  --------------------------------------------------------  IN:    Oral Fluid: 240 mL  Total IN: 240 mL    OUT:    Bulb (mL): 5 mL    Bulb (mL): 10 mL    Bulb (mL): 15 mL    Indwelling Catheter - Urethral (mL): 950 mL    Voided (mL): 250 mL  Total OUT: 1230 mL    Total NET: -990 mL                                    7.5    4.66  )-----------( 363      ( 22 Oct 2024 05:22 )             23.6     22 Oct 2024 05:22    133    |  100    |  9.2    ----------------------------<  79     4.7     |  23.0   |  0.44     Ca    8.2        22 Oct 2024 05:22  Phos  2.6       22 Oct 2024 05:22  Mg     1.7       22 Oct 2024 05:22        CAPILLARY BLOOD GLUCOSE          Urinalysis Basic - ( 22 Oct 2024 05:22 )    Color: x / Appearance: x / SG: x / pH: x  Gluc: 79 mg/dL / Ketone: x  / Bili: x / Urobili: x   Blood: x / Protein: x / Nitrite: x   Leuk Esterase: x / RBC: x / WBC x   Sq Epi: x / Non Sq Epi: x / Bacteria: x        MEDICATIONS  (STANDING):  acetaminophen     Tablet .. 975 milliGRAM(s) Oral every 8 hours  amLODIPine   Tablet 5 milliGRAM(s) Oral daily  ampicillin  IVPB 2 Gram(s) IV Intermittent every 6 hours  apixaban 2.5 milliGRAM(s) Oral two times a day  influenza  Vaccine (HIGH DOSE) 0.5 milliLiter(s) IntraMuscular once  metoprolol succinate ER 50 milliGRAM(s) Oral daily  metroNIDAZOLE    Tablet 500 milliGRAM(s) Oral every 12 hours  pantoprazole    Tablet 40 milliGRAM(s) Oral before breakfast  polyethylene glycol 3350 17 Gram(s) Oral at bedtime  senna 2 Tablet(s) Oral at bedtime  tamsulosin 0.4 milliGRAM(s) Oral at bedtime  venlafaxine XR. 150 milliGRAM(s) Oral daily    MEDICATIONS  (PRN):  bisacodyl Suppository 10 milliGRAM(s) Rectal daily PRN Constipation  HYDROmorphone   Tablet 4 milliGRAM(s) Oral every 3 hours PRN Severe Pain (7 - 10)  magnesium hydroxide Suspension 30 milliLiter(s) Oral daily PRN Constipation  mineral oil enema 133 milliLiter(s) Rectal once PRN if no BM after suppository  ondansetron Injectable 4 milliGRAM(s) IV Push every 6 hours PRN Nausea and/or Vomiting  oxyCODONE    IR 10 milliGRAM(s) Oral every 3 hours PRN Moderate Pain (4 - 6)  oxyCODONE    IR 5 milliGRAM(s) Oral every 3 hours PRN Mild Pain (1 - 3)      RADIOLOGY & ADDITIONAL TESTS:

## 2024-10-22 NOTE — PROGRESS NOTE ADULT - ASSESSMENT
Patient is a 89y Female presenting to the emergency department with a chief complaint of right knee open wound with drainage.  Patient had a R TKA revision with extensor mechanism reconstruction, incision and drainage on 7/19/24 with Dr Johnston.  Patient was discharged with PICC line on cefepime.  Patient has been seeing her new ID doctor where she lives in [place] who currently has her on bactrim.  Patient states that there has been a small open wound sin e the surgery that has grown in size, it started draining and had a foul odor for the past 10 days.  Patient sent to ED by Dr Johnston.   Patient was be admitted to orthopedics service for surgery. Started on Meropenem and Daptomycin.    Infected Right TKA s/p Washout     - VS stable  - abx per ortho/ID   - encouraging incentive spirometry   -c/w local wound care per ortho   -DVT prophylaxis and Pain meds as per Ortho team   -PT/OT and weight bearing per ortho (WBAT with KI)    - Continue  Vancomycin, will order Cr for AM  - Started on  Metronidazole 500mg PO q 12hours   - Continue Ampicillin 2gm IV [id number]   - follow cultures,  TTE and ABD/Pelvis CT ordered to assess for source    chronic Anemia: HGB 7.5, transfuse one unit PRBC    Urinary retention/Constipation  Carrera placed.  Had BM yesterday. Would attempt voiding trial when more ambulatory.     HTN/HLD  -Continue Amlodipine 5 mg daily  -Continue Metoprolol 50 mg BID  -will hold Valsartan 320 mg daily due to hyperkalemia   will continue BP meds with holding parameters   -Patient reports statin was recently d/jose by her doctor  -Continue Atorvastatin 20 mg qhs    Hx of Left Femoral Vein DVT  -Eliquis on hold for OR  -LE doppler negative for DVT  -resumed anticoagulation after surgery    Depression  -Continue Venlafaxine  mg daily.     Hx of C. Diff  -Continue with Dificid taper    Hyponatremia: uptrending, continue to monitor.     Hyperkalemia: Resolved, if uptrends would put on low potassium diet

## 2024-10-23 LAB
HCT VFR BLD CALC: 28.5 % — LOW (ref 34.5–45)
HGB BLD-MCNC: 9.3 G/DL — LOW (ref 11.5–15.5)
MCHC RBC-ENTMCNC: 30.3 PG — SIGNIFICANT CHANGE UP (ref 27–34)
MCHC RBC-ENTMCNC: 32.6 GM/DL — SIGNIFICANT CHANGE UP (ref 32–36)
MCV RBC AUTO: 92.8 FL — SIGNIFICANT CHANGE UP (ref 80–100)
PLATELET # BLD AUTO: 354 K/UL — SIGNIFICANT CHANGE UP (ref 150–400)
RBC # BLD: 3.07 M/UL — LOW (ref 3.8–5.2)
RBC # FLD: 15.9 % — HIGH (ref 10.3–14.5)
WBC # BLD: 5.94 K/UL — SIGNIFICANT CHANGE UP (ref 3.8–10.5)
WBC # FLD AUTO: 5.94 K/UL — SIGNIFICANT CHANGE UP (ref 3.8–10.5)

## 2024-10-23 PROCEDURE — 99233 SBSQ HOSP IP/OBS HIGH 50: CPT

## 2024-10-23 PROCEDURE — 99232 SBSQ HOSP IP/OBS MODERATE 35: CPT

## 2024-10-23 RX ADMIN — AMPICILLIN 200 GRAM(S): 2 INJECTION, POWDER, FOR SOLUTION INTRAVENOUS at 09:40

## 2024-10-23 RX ADMIN — METRONIDAZOLE 500 MILLIGRAM(S): 250 TABLET ORAL at 18:23

## 2024-10-23 RX ADMIN — Medication 50 MILLIGRAM(S): at 05:47

## 2024-10-23 RX ADMIN — APIXABAN 2.5 MILLIGRAM(S): 5 TABLET, FILM COATED ORAL at 18:24

## 2024-10-23 RX ADMIN — PANTOPRAZOLE SODIUM 40 MILLIGRAM(S): 40 TABLET, DELAYED RELEASE ORAL at 05:00

## 2024-10-23 RX ADMIN — Medication 975 MILLIGRAM(S): at 14:03

## 2024-10-23 RX ADMIN — APIXABAN 2.5 MILLIGRAM(S): 5 TABLET, FILM COATED ORAL at 05:01

## 2024-10-23 RX ADMIN — Medication 5 MILLIGRAM(S): at 05:01

## 2024-10-23 RX ADMIN — Medication 975 MILLIGRAM(S): at 21:49

## 2024-10-23 RX ADMIN — Medication 975 MILLIGRAM(S): at 05:31

## 2024-10-23 RX ADMIN — AMPICILLIN 200 GRAM(S): 2 INJECTION, POWDER, FOR SOLUTION INTRAVENOUS at 21:49

## 2024-10-23 RX ADMIN — AMPICILLIN 200 GRAM(S): 2 INJECTION, POWDER, FOR SOLUTION INTRAVENOUS at 03:02

## 2024-10-23 RX ADMIN — AMPICILLIN 200 GRAM(S): 2 INJECTION, POWDER, FOR SOLUTION INTRAVENOUS at 14:03

## 2024-10-23 RX ADMIN — Medication 0.4 MILLIGRAM(S): at 21:49

## 2024-10-23 RX ADMIN — METRONIDAZOLE 500 MILLIGRAM(S): 250 TABLET ORAL at 05:01

## 2024-10-23 RX ADMIN — Medication 150 MILLIGRAM(S): at 14:02

## 2024-10-23 RX ADMIN — Medication 975 MILLIGRAM(S): at 05:00

## 2024-10-23 NOTE — PROGRESS NOTE ADULT - ASSESSMENT
89-year-old female underwent right total knee arthroplasty in  August 2022 at a facility in [place].  After the replacement patient had pain in the right knee.  She was seen by orthopedics during this time.  She lives between [place], [place], [place] and comes to [facility] during some summer months.  She was seen by her orthopedic surgeon in [place], [place] Dr. Garza and underwent revision in December 2023 at which time per the patient the orthopedist (Dr. Garza) did not notice anything abnormal about her knee replacement.  Patient again had another revision in [place] in February 2024.  Postoperatively since February patient was hospitalized in [place] and found to have MSSA bacteremia and was seen by infectious diseases in that hospitalization, underwent IV antibiotic treatment with cefazolin and Rifampin with anticipated end date of April 22, 2024.  She was discharged to a rehab facility and readmitted from the rehab for unclear reasons.  From some documentation the patient has patient had a drug rash because of rifampin and that was discontinued.  Cefazolin was switched to daptomycin.  Patient had another revision of the knee on March 27, 2024.  And was treated with another 6 weeks of antibiotics this time daptomycin which was completed on May 9.  Patient also had multiple echocardiograms last one on May 6 without any evidence of endocarditis.  After completion of intravenous antibiotics patient was placed on Bactrim for 1 month.  She left [place] and went to [place].  She was seen by her infectious disease physician Dr Lila Alvarado in [place], [place]. She followed up here in [place] with Dr Johnston on June 24, 2024 and Antibiotics was discontinued. Patient underwent right knee joint aspiration July 1, 2024 with 11k nucleated cell. Patient had another right knee aspiration on July 10 which the specimen was clotted and was not analyzed by the Lab. Patient was brought to the ER on July 17, 2024 and underwent another arthrocentesis with a cell fluid count of 32, 580 nucleated cells.  Was taken to the OR for Septic PJI Rt knee infection and s/p right TKA 7/19/24, Joint fluid cultures reporting Serratia marcescens, Joint fluid PCR reporting Serratia marcescens and was planned to be on Cefepime till 8/29/24, was discharged July 24, 2024 to a [facility] in [place] and followed with ID physician Dr Lila Alvarado in [place]. Patient was on Bactrim after the IV antibiotics managed by her ID doctor. She also developed C diff initially resolved with PO Vanco followed by 1st reoccurrence and treated with Dificid. Patient states that there has been a small open wound since the surgery that has grown in size, it started draining and had a foul odor for the past 10 days prior to presentation.  Patient sent to ED by Dr Johnston on 10/17.  Patient was be admitted to orthopedics service for surgery. Started on Meropenem and Daptomycin.      PJI  Surgical incision site wound  h/o C diff  s/p right knee I&D with poly exchange, patellectomy by Dr. Johnston & gastroc flap and skin graft  10/18/24      - OR cultures reporting  Enterococcus faecalis, Prevotella disiens and Finegoldia magna  - D/C Vancomycin  - Continue Metronidazole 500mg PO q 12hours   - Continue Ampicillin 2gm IV [id number]   - Will need Metronidazole and Ampicillin till 11/28/24  - monitor for diarrhea   - TTE 10/22 with no gross veg   - CT A/P with oral and IV contrast 10/22 reporting no acute findings for infection   - Hold off on PICC/Midline for now unless needed for reasons other than infectious diseases  - Follow up cultures  - Trend Fever  - Trend WBC      Thank you for allowing me to participate in the care of your patient.   Will Follow    Discussed treatment plan with: medical team, Clinical pharmacy, Orthopedics and Dr Alvarado

## 2024-10-23 NOTE — PROGRESS NOTE ADULT - SUBJECTIVE AND OBJECTIVE BOX
Pt Name: TABITHA MENDENHALL    MRN: [id number]      Patient is POD#5 s/p right knee I/D, poly swap, patellectomy, gastic flap and skin graft plastics,  comfortable in bed. Knee immobilizer in place to RLE  3 drains per plastics        PAST MEDICAL & SURGICAL HISTORY:  PAST MEDICAL & SURGICAL HISTORY:  HTN (hypertension)      HLD (hyperlipidemia)      History of total hip replacement, left      S/P arthroscopy of shoulder  bilateral      S/P spinal fusion          Allergies: Demerol (Unknown)  fluoroquinolone antibiotics (Other)      Medications: acetaminophen     Tablet .. 975 milliGRAM(s) Oral every 8 hours  amLODIPine   Tablet 5 milliGRAM(s) Oral daily  ampicillin  IVPB 2 Gram(s) IV Intermittent every 6 hours  apixaban 2.5 milliGRAM(s) Oral two times a day  bisacodyl Suppository 10 milliGRAM(s) Rectal daily PRN  HYDROmorphone   Tablet 4 milliGRAM(s) Oral every 3 hours PRN  influenza  Vaccine (HIGH DOSE) 0.5 milliLiter(s) IntraMuscular once  magnesium hydroxide Suspension 30 milliLiter(s) Oral daily PRN  metoprolol succinate ER 50 milliGRAM(s) Oral daily  metroNIDAZOLE    Tablet 500 milliGRAM(s) Oral every 12 hours  mineral oil enema 133 milliLiter(s) Rectal once PRN  ondansetron Injectable 4 milliGRAM(s) IV Push every 6 hours PRN  oxyCODONE    IR 5 milliGRAM(s) Oral every 3 hours PRN  oxyCODONE    IR 10 milliGRAM(s) Oral every 3 hours PRN  pantoprazole    Tablet 40 milliGRAM(s) Oral before breakfast  polyethylene glycol 3350 17 Gram(s) Oral at bedtime  senna 2 Tablet(s) Oral at bedtime  tamsulosin 0.4 milliGRAM(s) Oral at bedtime  venlafaxine XR. 150 milliGRAM(s) Oral daily        Ambulation: WBAT in knee immobilizer for no more than 20 minutes. per Dr Soriano/ plastics  Mostly NWB RLE   KI at all times                          7.5    4.66  )-----------( 363      ( 22 Oct 2024 05:22 )             23.6     10-22    133[L]  |  100  |  9.2  ----------------------------<  79  4.7   |  23.0  |  0.44[L]    Ca    8.2[L]      22 Oct 2024 05:22  Phos  2.6     10-22  Mg     1.7     10-22        PHYSICAL EXAM:    Vital Signs Last 24 Hrs  T(C): 36.7 (23 Oct 2024 08:40), Max: 37.1 (22 Oct 2024 14:15)  T(F): 98.1 (23 Oct 2024 08:40), Max: 98.7 (22 Oct 2024 14:15)  HR: 82 (23 Oct 2024 08:40) (77 - 94)  BP: 118/61 (23 Oct 2024 08:40) (118/61 - 148/71)  BP(mean): --  RR: 18 (23 Oct 2024 08:40) (17 - 19)  SpO2: 94% (23 Oct 2024 08:40) (93% - 96%)    Parameters below as of 23 Oct 2024 08:40  Patient On (Oxygen Delivery Method): room air      Daily     Daily Weight in k (23 Oct 2024 01:41)    Appearance: Alert, responsive, in no acute distress.    Neurological: Sensation is grossly intact to light touch. 5/5 motor function of all extremities. No focal deficits or weaknesses found.    Vascular: 2+ distal pulses. Cap refill < 2 sec. No signs of venous   insufficiency   or stasis. No extremity ulcerations. No cyanosis.    KI in place to RLE  3 drains per plastics   EHL/FHL intact  ' motor and sensory intact.       A/P:  Pt is a  89y Female with     PLAN:   * drains per Dr Soriano  Antibiotics per infectious disease   transfused 1 unit PRBC 1 day ago/ repeat H/H ordered

## 2024-10-23 NOTE — PROGRESS NOTE ADULT - SUBJECTIVE AND OBJECTIVE BOX
[facility]  INFECTIOUS DISEASES at [place] / [place] / [place]  =======================================================                              Ady Zheng MD                              Professor Emeritus:  Dr Terrence Gong MD            Diplomates American Board of Internal Medicine & Infectious Diseases                                   Tel  [phone] Fax [phone]                                  Hospital Consult line:  [phone]  =======================================================      TABITHA MENDENHALL [id number]    Follow up: PJI    No fevers     s/p right knee I&D with poly exchange, patellectomy by Dr. Johnston & gastroc flap and skin graft  10/18/24      Allergies:  Demerol (Unknown)  fluoroquinolone antibiotics (Other)       REVIEW OF SYSTEMS:  CONSTITUTIONAL:  No Fever or chills  HEENT:  No diplopia or blurred vision.  No earache, sore throat or runny nose.  CARDIOVASCULAR:  No chest pain  RESPIRATORY:  No cough, shortness of breath  GASTROINTESTINAL:  No nausea, vomiting or diarrhea.  GENITOURINARY:  No dysuria, frequency or urgency. No Blood in urine  MUSCULOSKELETAL:  no joint aches, no muscle pain  SKIN:  No change in skin, hair or nails.  NEUROLOGIC:  No Headaches      Physical Exam:  GEN: NAD  HEENT: normocephalic and atraumatic. EOMI. PERRL.    NECK: Supple.   LUNGS: CTA B/L.  HEART: RRR  ABDOMEN: Soft, NT, ND.  +BS.    : No CVA tenderness  EXTREMITIES: Without  edema.  MSK: No joint swelling  NEUROLOGIC: No Focal Deficits   SKIN: No rash      Vitals:  T(F): 98.1 (23 Oct 2024 08:40), Max: 98.7 (22 Oct 2024 14:15)  HR: 82 (23 Oct 2024 08:40)  BP: 118/61 (23 Oct 2024 08:40)  RR: 18 (23 Oct 2024 08:40)  SpO2: 94% (23 Oct 2024 08:40) (93% - 96%)  temp max in last 48H T(F): , Max: 98.8 (10-21-24 @ 21:45)    Current Antibiotics:  ampicillin  IVPB 2 Gram(s) IV Intermittent every 6 hours  metroNIDAZOLE    Tablet 500 milliGRAM(s) Oral every 12 hours    Other medications:  acetaminophen     Tablet .. 975 milliGRAM(s) Oral every 8 hours  amLODIPine   Tablet 5 milliGRAM(s) Oral daily  apixaban 2.5 milliGRAM(s) Oral two times a day  influenza  Vaccine (HIGH DOSE) 0.5 milliLiter(s) IntraMuscular once  metoprolol succinate ER 50 milliGRAM(s) Oral daily  pantoprazole    Tablet 40 milliGRAM(s) Oral before breakfast  polyethylene glycol 3350 17 Gram(s) Oral at bedtime  senna 2 Tablet(s) Oral at bedtime  tamsulosin 0.4 milliGRAM(s) Oral at bedtime  venlafaxine XR. 150 milliGRAM(s) Oral daily                            9.3    5.94  )-----------( 354      ( 23 Oct 2024 09:36 )             28.5     10-22    133[L]  |  100  |  9.2  ----------------------------<  79  4.7   |  23.0  |  0.44[L]    Ca    8.2[L]      22 Oct 2024 05:22  Phos  2.6     10-22  Mg     1.7     10-22      RECENT CULTURES:  10-18 @ 15:15 Tissue Enterococcus faecalis    Moderate Enterococcus faecalis  Numerous Finegoldia magna "Susceptibilities not performed"  Rare Prevotella disiens "Susceptibilities not performed"  No polymorphonuclear cells seen per low power field  Few-moderate Gram positive cocci in pairs seen per oil power field      WBC Count: 5.94 K/uL (10-23-24 @ 09:36)  WBC Count: 4.66 K/uL (10-22-24 @ 05:22)  WBC Count: 5.27 K/uL (10-21-24 @ 09:10)  WBC Count: 9.35 K/uL (10-20-24 @ 03:36)  WBC Count: 8.59 K/uL (10-19-24 @ 05:24)  WBC Count: 9.37 K/uL (10-18-24 @ 18:35)    Creatinine: 0.44 mg/dL (10-22-24 @ 05:22)  Creatinine: 0.50 mg/dL (10-21-24 @ 09:10)  Creatinine: 0.61 mg/dL (10-20-24 @ 03:36)  Creatinine: 0.70 mg/dL (10-19-24 @ 16:46)  Creatinine: 0.65 mg/dL (10-19-24 @ 09:12)  Creatinine: 0.61 mg/dL (10-19-24 @ 05:24)  Creatinine: 0.64 mg/dL (10-19-24 @ 05:24)  Creatinine: 0.67 mg/dL (10-18-24 @ 18:35)        < from: CT Abdomen and Pelvis w/ Oral Cont and w/ IV Cont (10.22.24 @ 18:51) >  ACC: [id number] EXAM:  CT ABDOMEN AND PELVIS OC IC   ORDERED BY: JASON RIVAS     PROCEDURE DATE:  10/22/2024      INTERPRETATION:  CLINICAL INFORMATION: Polymicrobial infection.    COMPARISON: 9/13/2019    CONTRAST/COMPLICATIONS:  IV Contrast: Omnipaque 350  90 cc administered   10 cc discarded  Oral Contrast: Gastroview  Complications: None reported at time of study completion    PROCEDURE:  CT of the Abdomen and Pelvis was performed.  Sagittal and coronal reformats were performed.    FINDINGS:  LOWER CHEST: Trace bilateral pleural effusions. Passive atelectasis at   the lung bases.    LIVER: Subcentimeter lesion right hepatic lobe too small to characterize,   likely a cyst.  BILE DUCTS: Normal caliber.  GALLBLADDER: Within normal limits.  SPLEEN: Within normal limits.  PANCREAS: Within normal limits.  ADRENALS: Within normal limits.  KIDNEYS/URETERS: Bilateral renal cortical scarring. Renal cysts and   lesions too small to characterize. Peripherally calcified right renal   cyst measuring 1.4 cm.    BLADDER: Air, likely related to instrumentation.  REPRODUCTIVE ORGANS: Uterus and bilateral adnexa within normal limits.    BOWEL: Small hiatal hernia. No bowel obstruction. Appendix is not   visualized. Stool throughout the colon.  PERITONEUM/RETROPERITONEUM: Within normal limits.  VESSELS: Atherosclerotic calcifications.  LYMPH NODES: No lymphadenopathy.  ABDOMINAL WALL: Mild subcutaneous edema.  BONES: Osteopenia and degenerative changes. Posterior fusion extending   fromL4 through S1. Bilateral total hip arthroplasty. Fracture of the   right iliac bone with fracture lines still evident. Sclerosis of the   surrounding bony structures.. Processes and nondisplaced fracture is also   present in the left pubic bone.    IMPRESSION:  *  No evidence of acute infectious process in the abdomen and pelvis.  *  Comminuted fracture of the left iliac bone and nondisplaced fracture   of the left pubic bone with surrounding sclerosis of the bony structures,   possibly related to healing. Further evaluation with MRI of the bony   pelvis is recommended to evaluate for potential pathologic fracture.    --- End of Report ---    < end of copied text >       < from: TTE W or WO Ultrasound Enhancing Agent (10.22.24 @ 12:34) >  CONCLUSIONS:      1. Left ventricular cavity is normal in size. Left ventricular systolic function is mildly decreased.   2. Basal and mid inferolateral wall and basal inferior segment are abnormal.   3. Normal right ventricular cavity size and normal right ventricular systolic function.   4. Left atrium is normal in size.   5. No pericardial effusion seen.   6. No left ventricular hypertrophy.    < end of copied text >

## 2024-10-23 NOTE — PROGRESS NOTE ADULT - SUBJECTIVE AND OBJECTIVE BOX
CC: right knee I/D, poly swap, patellectomy (23 Oct 2024 09:03)    HPI:  89y Female presenting to the emergency department with a chief complaint of right knee open wound with drainage.  Patient had a R TKA revision with extensor mechanism reconstruction, incision and drainage on 7/19/24 with Dr Johnston.  Patient was discharged with PICC line on cefepime.  Patient has been seeing her new ID doctor where she lives in [place] who currently has her on bactrim.  Patient stated that there had been a small open wound since the surgery that had grown in size, it started draining and had a foul odor for 10 days.  Patient sent to ED by Dr Johnston.  Patient now s/p right knee I/D, poly swap, patellectomy, gastic flap and skin graft 10/18/24.    INTERVAL HPI/OVERNIGHT EVENTS: Patient seen and examined sitting up in bed.  Patient reports right knee pain controlled.  Awaiting PT.  Patient reported headache this am, now resolved.  Patient denies any dizziness, SOB, CP, abdominal pain, nausea, vomiting, dysuria.  Other ROS reviewed and are negative.    Vital Signs Last 24 Hrs  T(C): 36.7 (23 Oct 2024 08:40), Max: 37.1 (22 Oct 2024 14:15)  T(F): 98.1 (23 Oct 2024 08:40), Max: 98.7 (22 Oct 2024 14:15)  HR: 82 (23 Oct 2024 08:40) (77 - 94)  BP: 118/61 (23 Oct 2024 08:40) (118/61 - 148/71)  BP(mean): --  RR: 18 (23 Oct 2024 08:40) (17 - 19)  SpO2: 94% (23 Oct 2024 08:40) (93% - 96%)    Parameters below as of 23 Oct 2024 08:40  Patient On (Oxygen Delivery Method): room air      I&O's Detail    22 Oct 2024 07:01  -  23 Oct 2024 07:00  --------------------------------------------------------  IN:    Oral Fluid: 240 mL  Total IN: 240 mL    OUT:    Bulb (mL): 40 mL    Bulb (mL): 25 mL    Bulb (mL): 20 mL    Indwelling Catheter - Urethral (mL): 600 mL  Total OUT: 685 mL    Total NET: -445 mL      PHYSICAL EXAM:  GENERAL: NAD  HEAD:  Atraumatic, Normocephalic  NECK: Supple, No JVD, Normal thyroid  NERVOUS SYSTEM:  Alert & Oriented X3, Good concentration; Motor Strength 5/5 B/L upper and lower extremities  CHEST/LUNG: Clear to auscultation bilaterally  HEART: Regular rate and rhythm; No murmurs, rubs, or gallops  ABDOMEN: Soft, Nontender, Nondistended; Bowel sounds present  EXTREMITIES:  2+ Peripheral Pulses, RLE with clean dressing and immobilizer and JPx3                            7.5    4.66  )-----------( 363      ( 22 Oct 2024 05:22 )             23.6     22 Oct 2024 05:22    133    |  100    |  9.2    ----------------------------<  79     4.7     |  23.0   |  0.44     Ca    8.2        22 Oct 2024 05:22  Phos  2.6       22 Oct 2024 05:22  Mg     1.7       22 Oct 2024 05:22      Urinalysis Basic - ( 22 Oct 2024 05:22 )    Color: x / Appearance: x / SG: x / pH: x  Gluc: 79 mg/dL / Ketone: x  / Bili: x / Urobili: x   Blood: x / Protein: x / Nitrite: x   Leuk Esterase: x / RBC: x / WBC x   Sq Epi: x / Non Sq Epi: x / Bacteria: x    Culture - Tissue with Gram Stain (10.18.24 @ 15:15)    Gram Stain:   No polymorphonuclear cells seen per low power field  Few Gram positive cocci in pairs seen per oil power field   -  Ampicillin: S <=2 Predicts results to ampicillin/sulbactam, amoxacillin-clavulanate and  piperacillin-tazobactam.   -  Vancomycin: S 2   Specimen Source: Tissue   Culture Results:   Moderate Enterococcus faecalis  Numerous Finegoldia magna "Susceptibilities not performed"   Organism Identification: Enterococcus faecalis   Organism: Enterococcus faecalis   Method Type: SACHIN    Culture - Tissue with Gram Stain (10.18.24 @ 15:15)    -  Ampicillin: S <=2 Predicts results to ampicillin/sulbactam, amoxacillin-clavulanate and  piperacillin-tazobactam.   Gram Stain:   No polymorphonuclear cells seen seen per low power field  Few Gram positive cocci in pairs seen per oil power field   -  Vancomycin: S 2   Specimen Source: Tissue   Culture Results:   Moderate Enterococcus faecalis  Rare Prevotella disiens "Susceptibilities not performed"  Moderate Finegoldia magna "Susceptibilities not performed"   Organism Identification: Enterococcus faecalis   Organism: Enterococcus faecalis   Method Type: SACHIN        MEDICATIONS  (STANDING):  acetaminophen     Tablet .. 975 milliGRAM(s) Oral every 8 hours  amLODIPine   Tablet 5 milliGRAM(s) Oral daily  ampicillin  IVPB 2 Gram(s) IV Intermittent every 6 hours  apixaban 2.5 milliGRAM(s) Oral two times a day  influenza  Vaccine (HIGH DOSE) 0.5 milliLiter(s) IntraMuscular once  metoprolol succinate ER 50 milliGRAM(s) Oral daily  metroNIDAZOLE    Tablet 500 milliGRAM(s) Oral every 12 hours  pantoprazole    Tablet 40 milliGRAM(s) Oral before breakfast  polyethylene glycol 3350 17 Gram(s) Oral at bedtime  senna 2 Tablet(s) Oral at bedtime  tamsulosin 0.4 milliGRAM(s) Oral at bedtime  venlafaxine XR. 150 milliGRAM(s) Oral daily    MEDICATIONS  (PRN):  bisacodyl Suppository 10 milliGRAM(s) Rectal daily PRN Constipation  HYDROmorphone   Tablet 4 milliGRAM(s) Oral every 3 hours PRN Severe Pain (7 - 10)  magnesium hydroxide Suspension 30 milliLiter(s) Oral daily PRN Constipation  mineral oil enema 133 milliLiter(s) Rectal once PRN if no BM after suppository  ondansetron Injectable 4 milliGRAM(s) IV Push every 6 hours PRN Nausea and/or Vomiting  oxyCODONE    IR 5 milliGRAM(s) Oral every 3 hours PRN Mild Pain (1 - 3)  oxyCODONE    IR 10 milliGRAM(s) Oral every 3 hours PRN Moderate Pain (4 - 6)      RADIOLOGY & ADDITIONAL TESTS:  < from: CT Abdomen and Pelvis w/ Oral Cont and w/ IV Cont (10.22.24 @ 18:51) >  ACC: [id number] EXAM:  CT ABDOMEN AND PELVIS OC IC   ORDERED BY: SUNJIT S   ROB     PROCEDURE DATE:  10/22/2024          INTERPRETATION:  CLINICAL INFORMATION: Polymicrobial infection.    COMPARISON: 9/13/2019    CONTRAST/COMPLICATIONS:  IV Contrast: Omnipaque 350  90 cc administered   10 cc discarded  Oral Contrast: Gastroview  Complications: None reported at time of study completion    PROCEDURE:  CT of the Abdomen and Pelvis was performed.  Sagittal and coronal reformats were performed.    FINDINGS:  LOWER CHEST: Trace bilateral pleural effusions. Passive atelectasis at   the lung bases.    LIVER: Subcentimeter lesion right hepatic lobe too small to characterize,   likely a cyst.  BILE DUCTS: Normal caliber.  GALLBLADDER: Within normal limits.  SPLEEN: Within normal limits.  PANCREAS: Within normal limits.  ADRENALS: Within normal limits.  KIDNEYS/URETERS: Bilateral renal cortical scarring. Renal cysts and   lesions too small to characterize. Peripherally calcified right renal   cyst measuring 1.4 cm.    BLADDER: Air, likely related to instrumentation.  REPRODUCTIVE ORGANS: Uterus and bilateral adnexa within normal limits.    BOWEL: Small hiatal hernia. No bowel obstruction. Appendix is not   visualized. Stool throughout the colon.  PERITONEUM/RETROPERITONEUM: Within normal limits.  VESSELS: Atherosclerotic calcifications.  LYMPH NODES: No lymphadenopathy.  ABDOMINAL WALL: Mild subcutaneous edema.  BONES: Osteopenia and degenerative changes. Posterior fusion extending   fromL4 through S1. Bilateral total hip arthroplasty. Fracture of the   right iliac bone with fracture lines still evident. Sclerosis of the   surrounding bony structures.. Processes and nondisplaced fracture is also   present in the left pubic bone.    IMPRESSION:  *  No evidence of acute infectious process in the abdomen and pelvis.  *  Comminuted fracture of the left iliac bone and nondisplaced fracture   of the left pubic bone with surrounding sclerosis of the bony structures,   possibly related to healing. Further evaluation with MRI of the bony   pelvis is recommended to evaluate for potential pathologic fracture.        --- End of Report ---            KIKI LAWRENCE MD; Attending Radiologist  This document has been electronically signed. Oct 23 2024  6:57AM    < end of copied text >      < from: TTE W or WO Ultrasound Enhancing Agent (10.22.24 @ 12:34) >  TRANSTHORACIC ECHOCARDIOGRAM REPORT  ________________________________________________________________________________                                      _______       Pt. Name:       TABITHA MENDENHALL Study Date:    10/22/2024  MRN:   [id number]                YOB: 1935  Accession #:    [id number]               Age:           89 years  Account#:       [id number]              Gender:        F  Heart Rate:                             Height:        62.00 in (157.48 cm)  Rhythm:                                 Weight:        110.00 lb (49.90 kg)  Blood Pressure: 122/71 mmHg             BSA/BMI:       1.48 m² / 20.12 kg/m²  ________________________________________________________________________________________  Referring Physician:    [id number] Dinh Johnston  Interpreting Physician: Shabana Thomson MD  Primary Sonographer:    Joselin Marie    CPT:               ECHO TTE WO CON COMP W DOPP - 14147.m  Indication(s):     Cardiac murmur, unspecified - R01.1  Procedure:         Transthoracic echocardiogram with 2-D, M-mode and complete                     spectral and color flow Doppler.  Ordering Location: [facility]  Admission Status:  Inpatient  Study Information: Image quality for this study is good.    _______________________________________________________________________________________     CONCLUSIONS:      1. Left ventricular cavity is normal in size. Left ventricular systolic function is mildly decreased.   2. Basal and mid inferolateral wall and basal inferior segment are abnormal.   3. Normal right ventricular cavity size and normal right ventricular systolic function.   4. Left atrium is normal in size.   5. No pericardial effusion seen.   6. No left ventricular hypertrophy.    ________________________________________________________________________________________  FINDINGS:     Left Ventricle:  The left ventricular cavity is normal in size. Left ventricular systolic function is mildly decreased. No left ventricular hypertrophy. There ismild (grade 1) left ventricular diastolic dysfunction.  LV Wall Scoring: The basal and mid inferolateral wall and basal inferior segment  are hypokinetic.          Right Ventricle:  The right ventricular cavity is normal in size and right ventricularsystolic function is normal.     Left Atrium:  The left atrium is normal in size.     Right Atrium:  The right atrium is normal in size.     Interatrial Septum:  The interatrial septum appears intact.     Aortic Valve:  The aortic valve appears trileaflet. There is calcification of the aortic valve leaflets. There is mild aortic regurgitation.     Mitral Valve:  There is mitral valve thickening of the anterior and posterior leaflets. There is calcification of the mitral valve annulus. There is mild mitral regurgitation.     Tricuspid Valve:  Structurally normal tricuspid valve with normal leaflet excursion. There is trace tricuspid regurgitation.     Pulmonic Valve:  Structurally normal pulmonic valve with normal leaflet excursion. There is trace pulmonic regurgitation.     Pulmonary Artery:  The main pulmonary artery is normal in size, origin, and position.     Aorta:  The aortic root appears normal in size.     Pericardium:  No pericardial effusion seen.     Systemic Veins:  The inferior vena cava is normal in size (normal <2.1cm) with normal inspiratory collapse (normal >50%) consistent with normal right atrial pressure (~3, range 0-5mmHg).  ____________________________________________________________________  QUANTITATIVE DATA:  Left Ventricle Measurements: (Indexed to BSA)     IVSd (2D):   1.0 cm  LVPWd (2D):  1.1 cm  LVIDd (2D):  3.5 cm  LVIDs (2D):  2.7 cm  LV Mass:     112 g  75.6 g/m²     MV E Vmax:   0.81 m/s  MV A Vmax:   0.92 m/s  MV E/A:      0.89  e' medial:   6.96 cm/s  E/e' medial: 11.67    Aorta Measurements: (Normal range) (Indexed to BSA)     Ao Root d 3.90 cm (2.7 - 3.3 cm) 2.63 cm/m²            Left Atrium Measurements: (Indexed to BSA)  LA Diam 2D: 3.00 cm            LVOT / RVOT/ Qp/Qs Data: (Indexed to BSA)  LVOT Diameter:  2.10 cm  LVOT Area:      3.46 cm²  LVOT Vmax:      0.91 m/s  LVOT Vmn:       0.624 m/s  LVOT VTI:       14.80 cm  LVOT peak grad: 3 mmHg  LVOT mean grad: 2.0 mmHg  LVOT SV:        51.3 ml   34.57 ml/m²    Aortic Valve Measurements:  AV Vmax:                1.1 m/s  AV Peak Gradient:       4.8 mmHg  AV Mean Gradient:       3.0 mmHg  AV VTI:                 20.9 cm  AV VTI Ratio:           0.71  AoV EOA, Contin:        2.45 cm²  AoV EOA, Contin i:      1.65 cm²/m²  AoV Dimensionless Index 0.71    Mitral Valve Measurements:     MV E Vmax: 0.8 m/s  MV A Vmax: 0.9 m/s  MV E/A:    0.9       Tricuspid Valve Measurements:     RA Pressure: 3 mmHg    ________________________________________________________________________________________  Electronically signed on 10/22/2024 at 2:03:02 PM by Shabana Thomson MD  ***         *** Final ***    < end of copied text >

## 2024-10-23 NOTE — PROGRESS NOTE ADULT - SUBJECTIVE AND OBJECTIVE BOX
POD 5 s/p gastroc muslce flap coverage of right knee wound.  Doing well.  Pt able to ambulate with immobilizer.  No fevers.     AVSS    Right knee incision is in tact.  Moderate lower leg edema.  No collections or erythema.  Visible muscle flap is viable, skin graft is adherent.    JPs serosang      A/P:  OK to ambulate with knee immobilizer, limit leg down for 20-30 minutes at a time  Leg elevation when possible  Abx per ID  Will remove one drain tomorrow  Plan for rehab early next week

## 2024-10-23 NOTE — PROGRESS NOTE ADULT - ASSESSMENT
Patient is a 89y Female presenting to the emergency department with a chief complaint of right knee open wound with drainage.  Patient had a R TKA revision with extensor mechanism reconstruction, incision and drainage on 7/19/24 with Dr Johnston.  Patient was discharged with PICC line on cefepime.  Patient had been seeing her new ID doctor where she lives in [place] who currently had her on bactrim.  Patient stated that there had been a small open wound since the surgery that had grown in size, it started draining and had a foul odor for 10 days.  Patient sent to ED by Dr Johnston.   Patient was be admitted to orthopedics service for surgery. Started on Meropenem and Daptomycin.   Patient now s/p right knee I/D, poly swap, patellectomy, gastic flap and skin graft 10/18/24.    Infected Right TKA s/p Washout     - VS stable  - abx per ortho/ID   - encouraging incentive spirometry   -c/w local wound care per ortho and plastics   -DVT prophylaxis and Pain meds as per Ortho team   -PT/OT and weight bearing per ortho (WBAT with KI)    -Vancomycin dc'ed as per ID  -Continue Metronidazole 500mg PO q 12hours   -Continue Ampicillin 2gm IV [id number]   -follow cultures - Entercoccus faecalis, Finegoldia magna, Prevotella disiens  -TTE with no vegetation  -ABD/Pelvis CT negative     chronic Anemia: HGB 7.5, s/p one unit PRBC 10/22/24, monitor CBC.    Urinary retention/Constipation  Improved, voiding without difficulty.    HTN/HLD  -Continue Amlodipine 5 mg daily  -Continue Metoprolol 50 mg BID  -held Valsartan 320 mg daily due to hyperkalemia   -will continue BP meds with holding parameters   -Continue Atorvastatin 20 mg qhs-Patient reports statin was recently d/jose by her doctor    Hx of Left Femoral Vein DVT  -Continue Eliquis   -LE doppler negative for DVT    Depression  -Continue Venlafaxine  mg daily.     Hx of C. Diff  -Continue with Dificid taper, if needed.    Hyponatremia: improved, continue to monitor.     Hyperkalemia: Resolved, if uptrends would put on low potassium diet

## 2024-10-24 ENCOUNTER — TRANSCRIPTION ENCOUNTER (OUTPATIENT)
Age: 89
End: 2024-10-24

## 2024-10-24 LAB
ALBUMIN SERPL ELPH-MCNC: 2.7 G/DL — LOW (ref 3.3–5.2)
ALP SERPL-CCNC: 82 U/L — SIGNIFICANT CHANGE UP (ref 40–120)
ALT FLD-CCNC: 76 U/L — HIGH
ANION GAP SERPL CALC-SCNC: 11 MMOL/L — SIGNIFICANT CHANGE UP (ref 5–17)
AST SERPL-CCNC: 60 U/L — HIGH
BILIRUB SERPL-MCNC: <0.2 MG/DL — LOW (ref 0.4–2)
BUN SERPL-MCNC: 9.3 MG/DL — SIGNIFICANT CHANGE UP (ref 8–20)
CALCIUM SERPL-MCNC: 8.8 MG/DL — SIGNIFICANT CHANGE UP (ref 8.4–10.5)
CHLORIDE SERPL-SCNC: 99 MMOL/L — SIGNIFICANT CHANGE UP (ref 96–108)
CO2 SERPL-SCNC: 23 MMOL/L — SIGNIFICANT CHANGE UP (ref 22–29)
CREAT SERPL-MCNC: 0.53 MG/DL — SIGNIFICANT CHANGE UP (ref 0.5–1.3)
EGFR: 89 ML/MIN/1.73M2 — SIGNIFICANT CHANGE UP
GLUCOSE SERPL-MCNC: 82 MG/DL — SIGNIFICANT CHANGE UP (ref 70–99)
HCT VFR BLD CALC: 31.7 % — LOW (ref 34.5–45)
HGB BLD-MCNC: 10.2 G/DL — LOW (ref 11.5–15.5)
MAGNESIUM SERPL-MCNC: 1.9 MG/DL — SIGNIFICANT CHANGE UP (ref 1.8–2.6)
MCHC RBC-ENTMCNC: 29.6 PG — SIGNIFICANT CHANGE UP (ref 27–34)
MCHC RBC-ENTMCNC: 32.2 GM/DL — SIGNIFICANT CHANGE UP (ref 32–36)
MCV RBC AUTO: 91.9 FL — SIGNIFICANT CHANGE UP (ref 80–100)
PLATELET # BLD AUTO: 344 K/UL — SIGNIFICANT CHANGE UP (ref 150–400)
POTASSIUM SERPL-MCNC: 3.9 MMOL/L — SIGNIFICANT CHANGE UP (ref 3.5–5.3)
POTASSIUM SERPL-SCNC: 3.9 MMOL/L — SIGNIFICANT CHANGE UP (ref 3.5–5.3)
PROT SERPL-MCNC: 6.5 G/DL — LOW (ref 6.6–8.7)
RBC # BLD: 3.45 M/UL — LOW (ref 3.8–5.2)
RBC # FLD: 15.8 % — HIGH (ref 10.3–14.5)
SODIUM SERPL-SCNC: 133 MMOL/L — LOW (ref 135–145)
TSH SERPL-MCNC: 7.45 UIU/ML — HIGH (ref 0.27–4.2)
WBC # BLD: 5.11 K/UL — SIGNIFICANT CHANGE UP (ref 3.8–10.5)
WBC # FLD AUTO: 5.11 K/UL — SIGNIFICANT CHANGE UP (ref 3.8–10.5)

## 2024-10-24 PROCEDURE — 99232 SBSQ HOSP IP/OBS MODERATE 35: CPT

## 2024-10-24 PROCEDURE — 93010 ELECTROCARDIOGRAM REPORT: CPT

## 2024-10-24 RX ORDER — METOPROLOL TARTRATE 50 MG
5 TABLET ORAL EVERY 6 HOURS
Refills: 0 | Status: DISCONTINUED | OUTPATIENT
Start: 2024-10-24 | End: 2024-10-29

## 2024-10-24 RX ORDER — VENLAFAXINE HCL 150 MG
150 CAPSULE, EXT RELEASE 24 HR ORAL
Refills: 0 | Status: DISCONTINUED | OUTPATIENT
Start: 2024-10-24 | End: 2024-10-29

## 2024-10-24 RX ADMIN — PANTOPRAZOLE SODIUM 40 MILLIGRAM(S): 40 TABLET, DELAYED RELEASE ORAL at 05:28

## 2024-10-24 RX ADMIN — Medication 975 MILLIGRAM(S): at 12:30

## 2024-10-24 RX ADMIN — Medication 975 MILLIGRAM(S): at 05:29

## 2024-10-24 RX ADMIN — AMPICILLIN 200 GRAM(S): 2 INJECTION, POWDER, FOR SOLUTION INTRAVENOUS at 21:59

## 2024-10-24 RX ADMIN — Medication 5 MILLIGRAM(S): at 05:29

## 2024-10-24 RX ADMIN — METRONIDAZOLE 500 MILLIGRAM(S): 250 TABLET ORAL at 17:05

## 2024-10-24 RX ADMIN — AMPICILLIN 200 GRAM(S): 2 INJECTION, POWDER, FOR SOLUTION INTRAVENOUS at 08:30

## 2024-10-24 RX ADMIN — METRONIDAZOLE 500 MILLIGRAM(S): 250 TABLET ORAL at 05:28

## 2024-10-24 RX ADMIN — APIXABAN 2.5 MILLIGRAM(S): 5 TABLET, FILM COATED ORAL at 17:06

## 2024-10-24 RX ADMIN — Medication 975 MILLIGRAM(S): at 21:58

## 2024-10-24 RX ADMIN — Medication 150 MILLIGRAM(S): at 12:35

## 2024-10-24 RX ADMIN — Medication 50 MILLIGRAM(S): at 05:29

## 2024-10-24 RX ADMIN — Medication 975 MILLIGRAM(S): at 13:00

## 2024-10-24 RX ADMIN — AMPICILLIN 200 GRAM(S): 2 INJECTION, POWDER, FOR SOLUTION INTRAVENOUS at 04:21

## 2024-10-24 RX ADMIN — APIXABAN 2.5 MILLIGRAM(S): 5 TABLET, FILM COATED ORAL at 05:29

## 2024-10-24 RX ADMIN — Medication 975 MILLIGRAM(S): at 22:40

## 2024-10-24 NOTE — PROGRESS NOTE ADULT - SUBJECTIVE AND OBJECTIVE BOX
CC: right knee I/D, poly swap, patellectomy (23 Oct 2024 09:03)    HPI:  89y Female presenting to the emergency department with a chief complaint of right knee open wound with drainage.  Patient had a R TKA revision with extensor mechanism reconstruction, incision and drainage on 7/19/24 with Dr Johnston.  Patient was discharged with PICC line on cefepime.  Patient has been seeing her new ID doctor where she lives in [place] who currently has her on bactrim.  Patient stated that there had been a small open wound since the surgery that had grown in size, it started draining and had a foul odor for 10 days.  Patient sent to ED by Dr Johnston.  Patient now s/p right knee I/D, poly swap, patellectomy, gastic flap and skin graft 10/18/24.    INTERVAL HPI/OVERNIGHT EVENTS: Patient seen and examined sitting up in the chair.  Patient noted to be tachycardic this am 120s, now 90s-105.  Patient reports pain controlled.  Tolerated PT this am.  Patient denies any headache, dizziness, SOB, CP, abdominal pain, nausea, vomiting, dysuria.  Other ROS reviewed and are negative.    Vital Signs Last 24 Hrs  T(C): 36.4 (24 Oct 2024 09:50), Max: 36.9 (24 Oct 2024 04:58)  T(F): 97.6 (24 Oct 2024 09:50), Max: 98.4 (24 Oct 2024 04:58)  HR: 92 (24 Oct 2024 09:50) (92 - 100)  BP: 118/69 (24 Oct 2024 09:50) (118/69 - 147/80)  BP(mean): --  RR: 18 (24 Oct 2024 09:50) (18 - 18)  SpO2: 98% (24 Oct 2024 09:50) (95% - 98%)    Parameters below as of 24 Oct 2024 09:50  Patient On (Oxygen Delivery Method): room air      I&O's Detail    23 Oct 2024 07:01  -  24 Oct 2024 07:00  --------------------------------------------------------  IN:    Oral Fluid: 240 mL  Total IN: 240 mL    OUT:    Bulb (mL): 50 mL    Bulb (mL): 25 mL    Bulb (mL): 15 mL  Total OUT: 90 mL    Total NET: 150 mL      PHYSICAL EXAM:  GENERAL: NAD  HEAD:  Atraumatic, Normocephalic  NECK: Supple, No JVD, Normal thyroid  NERVOUS SYSTEM:  Alert & Oriented X3, Good concentration; Motor Strength 5/5 B/L upper and lower extremities  CHEST/LUNG: Clear to auscultation bilaterally  HEART: Regular rate and rhythm; No murmurs, rubs, or gallops  ABDOMEN: Soft, Nontender, Nondistended; Bowel sounds present  EXTREMITIES:  2+ Peripheral Pulses, RLE with clean dressing and immobilizer and KB x2  SKIN: No rashes or lesions                                10.2   5.11  )-----------( 344      ( 24 Oct 2024 05:30 )             31.7     24 Oct 2024 05:30    133    |  99     |  9.3    ----------------------------<  82     3.9     |  23.0   |  0.53     Ca    8.8        24 Oct 2024 05:30  Mg     1.9       24 Oct 2024 05:30    TPro  6.5    /  Alb  2.7    /  TBili  <0.2   /  DBili  x      /  AST  60     /  ALT  76     /  AlkPhos  82     24 Oct 2024 05:30        LIVER FUNCTIONS - ( 24 Oct 2024 05:30 )  Alb: 2.7 g/dL / Pro: 6.5 g/dL / ALK PHOS: 82 U/L / ALT: 76 U/L / AST: 60 U/L / GGT: x           Urinalysis Basic - ( 24 Oct 2024 05:30 )    Color: x / Appearance: x / SG: x / pH: x  Gluc: 82 mg/dL / Ketone: x  / Bili: x / Urobili: x   Blood: x / Protein: x / Nitrite: x   Leuk Esterase: x / RBC: x / WBC x   Sq Epi: x / Non Sq Epi: x / Bacteria: x        MEDICATIONS  (STANDING):  acetaminophen     Tablet .. 975 milliGRAM(s) Oral every 8 hours  amLODIPine   Tablet 5 milliGRAM(s) Oral daily  ampicillin  IVPB 2 Gram(s) IV Intermittent every 6 hours  apixaban 2.5 milliGRAM(s) Oral two times a day  influenza  Vaccine (HIGH DOSE) 0.5 milliLiter(s) IntraMuscular once  metoprolol succinate ER 50 milliGRAM(s) Oral daily  metroNIDAZOLE    Tablet 500 milliGRAM(s) Oral every 12 hours  pantoprazole    Tablet 40 milliGRAM(s) Oral before breakfast  polyethylene glycol 3350 17 Gram(s) Oral at bedtime  senna 2 Tablet(s) Oral at bedtime  tamsulosin 0.4 milliGRAM(s) Oral at bedtime  venlafaxine XR. 150 milliGRAM(s) Oral daily    MEDICATIONS  (PRN):  bisacodyl Suppository 10 milliGRAM(s) Rectal daily PRN Constipation  HYDROmorphone   Tablet 4 milliGRAM(s) Oral every 3 hours PRN Severe Pain (7 - 10)  magnesium hydroxide Suspension 30 milliLiter(s) Oral daily PRN Constipation  metoprolol tartrate Injectable 5 milliGRAM(s) IV Push every 6 hours PRN HR >130  mineral oil enema 133 milliLiter(s) Rectal once PRN if no BM after suppository  ondansetron Injectable 4 milliGRAM(s) IV Push every 6 hours PRN Nausea and/or Vomiting  oxyCODONE    IR 5 milliGRAM(s) Oral every 3 hours PRN Mild Pain (1 - 3)  oxyCODONE    IR 10 milliGRAM(s) Oral every 3 hours PRN Moderate Pain (4 - 6)

## 2024-10-24 NOTE — DISCHARGE NOTE PROVIDER - NSDCFUADDINST_GEN_ALL_CORE_FT
The patient will be seen in the office between 2-3 weeks for wound check.   **Your first post-operative visit has been scheduled prior to your admission. PLEASE CONTACT OFFICE TO CONFIRM THE APPOINTMENT DATE. Sutures/Staples/Tape will be removed at that time.  **  The silver based dressing is to be removed 7 days from the date of surgery.   ** CONTACT THE OFFICE IF THE FOLLOWING DEVELOP:  - the dressing becomes soiled or saturated  - you develop a fever greater that 101F  - the wound becomes red or you develop blistering around the wound  * Patient may shower after post-op day #3.   * The patient will continue home PT consistent with  total knee replacement protocol. Transition to outpatient PT will occur at the time of the first office visit.   * The patient will practice knee extension exercises regularly to minimize hamstring contraction.   * The patient is FULL weight bearing.  * The patient will continue ELIQUIS after surgery for blood clot prevention.  * The patient will take OXYCODONE for pain control and adjust according to prescription and patient needs. The patient will also take TYLENOL 975mg every 8 hours for pain control. Contact the office if pain increases while taking prescribed pain medications or related concerns develop.  * Celebrex will be taken twice daily for 3 weeks for pain control and prevention of excessive bone growth. Additional prescription may be requested at your office follow-up visit.   * The patient will take Senna S while taking oxycodone to prevent narcotic associated constipation.  Additionally, increase water intake (drink at least 8 glasses of water daily) and try adding fiber to the diet by eating fruits, vegetables and foods that are rich in grains. If constipation is experienced, contact the medical/primary care provider to discuss further treatment options.  * To avoid injury at home:  - continue use of rolling walker until cleared by physical therapist  - have family or friend remove all throw rug or objects in hallways that may present a trip hazard.  - if you experience any dizziness or medical concerns, call your medical doctor or  911.  * The implant may activate metal detection devices.     The patient will need an outpatient follow-up with gastroenterology for evaluation and colonoscopy.     The patient will require outpatient follow-up with an endocrinologist for thyroid function evaluation.  The patient will be seen in the office between 2-3 weeks for wound check.   **Your first post-operative visit has been scheduled prior to your admission. PLEASE CONTACT OFFICE TO CONFIRM THE APPOINTMENT DATE. Sutures/Staples/Tape will be removed at that time.  **  The silver based dressing is to be removed 7 days from the date of surgery.   ** CONTACT THE OFFICE IF THE FOLLOWING DEVELOP:  - the dressing becomes soiled or saturated  - you develop a fever greater that 101F  - the wound becomes red or you develop blistering around the wound  * Patient may shower after post-op day #3.   * The patient will continue home PT consistent with  total knee replacement protocol. Transition to outpatient PT will occur at the time of the first office visit.   * The patient will practice knee extension exercises regularly to minimize hamstring contraction.   * The patient is FULL weight bearing in knee immobilizer at all times. Can stand for no longer than 20 minutes at a time.  * The patient will continue ELIQUIS after surgery for blood clot prevention.  * The patient will take OXYCODONE for pain control and adjust according to prescription and patient needs. The patient will also take TYLENOL 975mg every 8 hours for pain control. Contact the office if pain increases while taking prescribed pain medications or related concerns develop.  * Celebrex will be taken twice daily for 3 weeks for pain control and prevention of excessive bone growth. Additional prescription may be requested at your office follow-up visit.   * The patient will take Senna S while taking oxycodone to prevent narcotic associated constipation.  Additionally, increase water intake (drink at least 8 glasses of water daily) and try adding fiber to the diet by eating fruits, vegetables and foods that are rich in grains. If constipation is experienced, contact the medical/primary care provider to discuss further treatment options.  * To avoid injury at home:  - continue use of rolling walker until cleared by physical therapist  - have family or friend remove all throw rug or objects in hallways that may present a trip hazard.  - if you experience any dizziness or medical concerns, call your medical doctor or  911.  * The implant may activate metal detection devices.     The patient will need an outpatient follow-up with gastroenterology for evaluation and colonoscopy.     The patient will require outpatient follow-up with an endocrinologist for thyroid function evaluation.

## 2024-10-24 NOTE — DISCHARGE NOTE PROVIDER - NSDCCPCAREPLAN_GEN_ALL_CORE_FT
PRINCIPAL DISCHARGE DIAGNOSIS  Diagnosis: Infected prosthetic knee joint  Assessment and Plan of Treatment:

## 2024-10-24 NOTE — DISCHARGE NOTE PROVIDER - HOSPITAL COURSE
The patient underwent a RIGHT TOTAL KNEE REPLACEMENT I&D/POLY EXCHANGE on 10/18/24 with Dr. Johnston. The patient received antibiotics consistent with SCIP guidelines. The patient underwent the procedure and had no intra-operative complications. Post-operatively, the patient was seen by medicine and PT. The patient received ASPIRIN for DVTP. The patient received pain medications per orthopedic pain management protocol and the pain was appropriately controlled. The patient did not have any post-operative medical complications. The patient was discharged in stable condition.

## 2024-10-24 NOTE — DISCHARGE NOTE PROVIDER - NSDCMRMEDTOKEN_GEN_ALL_CORE_FT
acetaminophen 325 mg oral tablet: 3 tab(s) orally every 8 hours  amLODIPine 5 mg oral tablet: 1 tab(s) orally once a day  atorvastatin 20 mg oral tablet: 1 tab(s) orally once a day (at bedtime)  cefepime 2 g intravenous injection: 2 gram(s) intravenous every 8 hours As per ID recommendation. End date 8/29/24  celecoxib 200 mg oral capsule: 1 cap(s) orally every 12 hours x 21 days  Dificid 200 mg oral tablet: 1 tab(s) orally every other day (at bedtime)  Eliquis 2.5 mg oral tablet: 1 tab(s) orally every 12 hours x 3 months for DVT treatment  furosemide 20 mg oral tablet: 1 tab(s) orally once a day  gabapentin 100 mg oral capsule: 1 cap(s) orally 3 times a day  metoprolol succinate 50 mg oral capsule, extended release: 1 cap(s) orally 2 times a day  naloxone 4 mg/0.1 mL nasal spray: 1 spray(s) intranasally once a day as needed for  narcotic overdose  omeprazole 20 mg oral delayed release capsule: 1 cap(s) orally once a day  oxyCODONE 5 mg oral tablet: 1 tab(s) orally every 6 hours  senna oral tablet: 2 tab(s) orally once a day (at bedtime)  valsartan 320 mg oral tablet: 1 tab(s) orally once a day with breakfast  venlafaxine 37.5 mg oral capsule, extended release: 1 cap(s) orally once a day   acetaminophen 325 mg oral tablet: 3 tab(s) orally every 8 hours  amLODIPine 5 mg oral tablet: 1 tab(s) orally once a day  ampicillin: 2 gram(s) intravenous every 6 hours  atorvastatin 20 mg oral tablet: 1 tab(s) orally once a day (at bedtime)  cefepime 2 g intravenous injection: 2 gram(s) intravenous every 8 hours As per ID recommendation. End date 8/29/24  Dificid 200 mg oral tablet: 1 tab(s) orally every other day (at bedtime)  Eliquis 2.5 mg oral tablet: 1 tab(s) orally every 12 hours x 3 months for DVT treatment  gabapentin 100 mg oral capsule: 1 cap(s) orally 3 times a day  metoprolol succinate 50 mg oral capsule, extended release: 1 cap(s) orally 2 times a day  metroNIDAZOLE 500 mg oral tablet: 1 tab(s) orally every 12 hours  oxyCODONE 5 mg oral tablet: 1 tab(s) orally every 6 hours  pantoprazole 40 mg oral delayed release tablet: 1 tab(s) orally once a day (before a meal)  senna leaf extract oral tablet: 2 tab(s) orally once a day (at bedtime)  tamsulosin 0.4 mg oral capsule: 1 cap(s) orally once a day (at bedtime)  valsartan 320 mg oral tablet: 1 tab(s) orally once a day with breakfast  venlafaxine 37.5 mg oral capsule, extended release: 1 cap(s) orally once a day

## 2024-10-24 NOTE — PROGRESS NOTE ADULT - ASSESSMENT
Patient is a 89y Female presenting to the emergency department with a chief complaint of right knee open wound with drainage.  Patient had a R TKA revision with extensor mechanism reconstruction, incision and drainage on 7/19/24 with Dr Johnston.  Patient was discharged with PICC line on cefepime.  Patient had been seeing her new ID doctor where she lives in [place] who currently had her on bactrim.  Patient stated that there had been a small open wound since the surgery that had grown in size, it started draining and had a foul odor for 10 days.  Patient sent to ED by Dr Johnston.   Patient was be admitted to orthopedics service for surgery. Started on Meropenem and Daptomycin.   Patient now s/p right knee I/D, poly swap, patellectomy, gastic flap and skin graft 10/18/24.    Infected Right TKA s/p Washout     - VS stable  - abx per ortho/ID   - encouraging incentive spirometry   -c/w local wound care per ortho and plastics   -DVT prophylaxis and Pain meds as per Ortho team   -PT/OT and weight bearing per ortho (WBAT with KI)    -Vancomycin dc'ed as per ID  -Continue Metronidazole 500mg PO q 12hours until 11/28/24  -Continue Ampicillin 2gm IV [id number] until 11/28/24  -follow cultures - Entercoccus faecalis, Finegoldia magna, Prevotella disiens  -TTE with no vegetation  -ABD/Pelvis CT negative   -patient will need outpatient colonoscopy.    Sinus Tachycardia  Likely secondary to ambulation.  TSH 7.45 - would repeat as outpatient.  EKG with .  Pain control.  Monitor on telemetry.    chronic Anemia: HGB 7.5, s/p one unit PRBC 10/22/24, monitor CBC, now stable.    Urinary retention/Constipation  Improved, voiding without difficulty.    HTN/HLD  -Continue Amlodipine 5 mg daily  -Continue Metoprolol 50 mg BID  -held Valsartan 320 mg daily due to hyperkalemia   -will continue BP meds with holding parameters   -Continue Atorvastatin 20 mg qhs-Patient reports statin was recently d/jose by her doctor    Hx of Left Femoral Vein DVT  -Continue Eliquis   -LE doppler negative for DVT    Depression  -Continue Venlafaxine  mg daily.     Hx of C. Diff  -Continue with Dificid taper, if needed.    Hyponatremia: improved, continue to monitor.     Hyperkalemia: Resolved, if uptrends would put on low potassium diet

## 2024-10-24 NOTE — PROGRESS NOTE ADULT - ASSESSMENT
89-year-old female underwent right total knee arthroplasty in  August 2022 at a facility in [place].  After the replacement patient had pain in the right knee.  She was seen by orthopedics during this time.  She lives between [place], [place], [place] and comes to [facility] during some summer months.  She was seen by her orthopedic surgeon in [place], [place] Dr. Garza and underwent revision in December 2023 at which time per the patient the orthopedist (Dr. Garza) did not notice anything abnormal about her knee replacement.  Patient again had another revision in [place] in February 2024.  Postoperatively since February patient was hospitalized in [place] and found to have MSSA bacteremia and was seen by infectious diseases in that hospitalization, underwent IV antibiotic treatment with cefazolin and Rifampin with anticipated end date of April 22, 2024.  She was discharged to a rehab facility and readmitted from the rehab for unclear reasons.  From some documentation the patient has patient had a drug rash because of rifampin and that was discontinued.  Cefazolin was switched to daptomycin.  Patient had another revision of the knee on March 27, 2024.  And was treated with another 6 weeks of antibiotics this time daptomycin which was completed on May 9.  Patient also had multiple echocardiograms last one on May 6 without any evidence of endocarditis.  After completion of intravenous antibiotics patient was placed on Bactrim for 1 month.  She left [place] and went to [place].  She was seen by her infectious disease physician Dr Lila Alvarado in [place], [place]. She followed up here in [place] with Dr Johnston on June 24, 2024 and Antibiotics was discontinued. Patient underwent right knee joint aspiration July 1, 2024 with 11k nucleated cell. Patient had another right knee aspiration on July 10 which the specimen was clotted and was not analyzed by the Lab. Patient was brought to the ER on July 17, 2024 and underwent another arthrocentesis with a cell fluid count of 32, 580 nucleated cells.  Was taken to the OR for Septic PJI Rt knee infection and s/p right TKA 7/19/24, Joint fluid cultures reporting Serratia marcescens, Joint fluid PCR reporting Serratia marcescens and was planned to be on Cefepime till 8/29/24, was discharged July 24, 2024 to a [facility] in [place] and followed with ID physician Dr Lila Alvarado in [place]. Patient was on Bactrim after the IV antibiotics managed by her ID doctor. She also developed C diff initially resolved with PO Vanco followed by 1st reoccurrence and treated with Dificid. Patient states that there has been a small open wound since the surgery that has grown in size, it started draining and had a foul odor for the past 10 days prior to presentation.  Patient sent to ED by Dr Johnston on 10/17.  Patient was be admitted to orthopedics service for surgery. Started on Meropenem and Daptomycin.      PJI  Surgical incision site wound  h/o C diff  s/p right knee I&D with poly exchange, patellectomy & gastroc flap and skin graft  10/18/24      - OR cultures reporting  Enterococcus faecalis, Prevotella disiens and Finegoldia magna  - Continue Metronidazole 500mg PO q 12hours   - Continue Ampicillin 2gm IV [id number]   - Will need Metronidazole and Ampicillin till 11/28/24  - monitor for diarrhea   - TTE 10/22 with no gross veg   - CT A/P with oral and IV contrast 10/22 reporting no acute findings for infection   - Hold off on PICC/Midline for now unless needed for reasons other than infectious diseases  - Patient has an Infectious Diseases physician she follows in [place], Dr Lila Colunga  - Follow up cultures  - Trend Fever  - Trend WBC      Will Follow    Discussed treatment plan with: medical team, Clinical pharmacy, Orthopedics and Dr Alvarado

## 2024-10-24 NOTE — DISCHARGE NOTE PROVIDER - CARE PROVIDER_API CALL
Dinh Johnston  Joint Reconstruction  [address] [place] [address]  Phone: [phone]  Fax: [phone]  Follow Up Time:   
no

## 2024-10-24 NOTE — PROGRESS NOTE ADULT - SUBJECTIVE AND OBJECTIVE BOX
TABITHA [place]    [id number]    History: 89y Female is status post right total knee I&D with poly swap and complex plastics closure on 10/18/24 POD # 6. Patient is doing well and is comfortable. The patient's pain is controlled using the prescribed pain medications. The patient is participating in physical therapy. Denies/nausea, vomiting, chest pain, shortness of breath, abdominal pain or fever. No new complaints.  Patient culture - poly microbial ID on board                             10.2   5.11  )-----------( 344      ( 24 Oct 2024 05:30 )             31.7     10-24    133[L]  |  99  |  9.3  ----------------------------<  82  3.9   |  23.0  |  0.53    Ca    8.8      24 Oct 2024 05:30    TPro  6.5[L]  /  Alb  2.7[L]  /  TBili  <0.2[L]  /  DBili  x   /  AST  60[H]  /  ALT  76[H]  /  AlkPhos  82  10-24      MEDICATIONS  (STANDING):  acetaminophen     Tablet .. 975 milliGRAM(s) Oral every 8 hours  amLODIPine   Tablet 5 milliGRAM(s) Oral daily  ampicillin  IVPB 2 Gram(s) IV Intermittent every 6 hours  apixaban 2.5 milliGRAM(s) Oral two times a day  influenza  Vaccine (HIGH DOSE) 0.5 milliLiter(s) IntraMuscular once  metoprolol succinate ER 50 milliGRAM(s) Oral daily  metroNIDAZOLE    Tablet 500 milliGRAM(s) Oral every 12 hours  pantoprazole    Tablet 40 milliGRAM(s) Oral before breakfast  polyethylene glycol 3350 17 Gram(s) Oral at bedtime  senna 2 Tablet(s) Oral at bedtime  tamsulosin 0.4 milliGRAM(s) Oral at bedtime  venlafaxine XR. 150 milliGRAM(s) Oral daily    MEDICATIONS  (PRN):  bisacodyl Suppository 10 milliGRAM(s) Rectal daily PRN Constipation  HYDROmorphone   Tablet 4 milliGRAM(s) Oral every 3 hours PRN Severe Pain (7 - 10)  magnesium hydroxide Suspension 30 milliLiter(s) Oral daily PRN Constipation  mineral oil enema 133 milliLiter(s) Rectal once PRN if no BM after suppository  ondansetron Injectable 4 milliGRAM(s) IV Push every 6 hours PRN Nausea and/or Vomiting  oxyCODONE    IR 10 milliGRAM(s) Oral every 3 hours PRN Moderate Pain (4 - 6)  oxyCODONE    IR 5 milliGRAM(s) Oral every 3 hours PRN Mild Pain (1 - 3)      Physical exam: The right knee dressing is clean, dry and intact. No drainage or discharge. No erythema is noted. No blistering.  Drain # 1 10/50, Drain 2 5/15, Drain 5/25  Drain 2 removed, incisions healing well, wound redressed    Primary Orthopedic Assessment:  • s/p RIGHT total knee replacement I&D with polyswap and complex wound closure     Secondary  Orthopedic Assessment(s):   •     Secondary  Medical Assessment(s):   •     Plan:   • DVT prophylaxis as prescribed of Eliquis   ABX per ID   WBAT in KI for 20 mins at a time   2 drains remaining

## 2024-10-24 NOTE — PROGRESS NOTE ADULT - SUBJECTIVE AND OBJECTIVE BOX
[facility]  INFECTIOUS DISEASES at [place] / [place] / [place]  =======================================================                              Ady Zheng MD                              Professor Emeritus:  Dr Terrence Gong MD            Diplomates American Board of Internal Medicine & Infectious Diseases                                   Tel  [phone] Fax [phone]                                  Hospital Consult line:  [phone]  =======================================================      TABITHA [place] [id number]    Follow up: PJI    No fevers     s/p right knee I&D with poly exchange, patellectomy & gastroc flap and skin graft  10/18/24      Allergies:  Demerol (Unknown)  fluoroquinolone antibiotics (Other)       REVIEW OF SYSTEMS:  CONSTITUTIONAL:  No Fever or chills  HEENT:  No diplopia or blurred vision.  No earache, sore throat or runny nose.  CARDIOVASCULAR:  No chest pain  RESPIRATORY:  No cough, shortness of breath  GASTROINTESTINAL:  No nausea, vomiting or diarrhea.  GENITOURINARY:  No dysuria, frequency or urgency. No Blood in urine  MUSCULOSKELETAL:  no joint aches, no muscle pain  SKIN:  No change in skin, hair or nails.  NEUROLOGIC:  No Headaches      Physical Exam:  GEN: NAD  HEENT: normocephalic and atraumatic. EOMI. PERRL.    NECK: Supple.   LUNGS: CTA B/L.  HEART: RRR  ABDOMEN: Soft, NT, ND.  +BS.    : No CVA tenderness  EXTREMITIES: Without  edema.  MSK: No joint swelling  NEUROLOGIC: No Focal Deficits   SKIN: No rash      Vitals:  T(F): 98.4 (24 Oct 2024 04:58), Max: 98.4 (24 Oct 2024 04:58)  HR: 93 (24 Oct 2024 04:58)  BP: 147/77 (24 Oct 2024 04:58)  RR: 18 (24 Oct 2024 04:58)  SpO2: 95% (24 Oct 2024 04:58) (94% - 96%)  temp max in last 48H T(F): , Max: 98.7 (10-22-24 @ 14:15)      Current Antibiotics:  ampicillin  IVPB 2 Gram(s) IV Intermittent every 6 hours  metroNIDAZOLE    Tablet 500 milliGRAM(s) Oral every 12 hours    Other medications:  acetaminophen     Tablet .. 975 milliGRAM(s) Oral every 8 hours  amLODIPine   Tablet 5 milliGRAM(s) Oral daily  apixaban 2.5 milliGRAM(s) Oral two times a day  influenza  Vaccine (HIGH DOSE) 0.5 milliLiter(s) IntraMuscular once  metoprolol succinate ER 50 milliGRAM(s) Oral daily  pantoprazole    Tablet 40 milliGRAM(s) Oral before breakfast  polyethylene glycol 3350 17 Gram(s) Oral at bedtime  senna 2 Tablet(s) Oral at bedtime  tamsulosin 0.4 milliGRAM(s) Oral at bedtime  venlafaxine XR. 150 milliGRAM(s) Oral daily                            10.2   5.11  )-----------( 344      ( 24 Oct 2024 05:30 )             31.7     10-24    133[L]  |  99  |  9.3  ----------------------------<  82  3.9   |  23.0  |  0.53    Ca    8.8      24 Oct 2024 05:30    TPro  6.5[L]  /  Alb  2.7[L]  /  TBili  <0.2[L]  /  DBili  x   /  AST  60[H]  /  ALT  76[H]  /  AlkPhos  82  10-24    RECENT CULTURES:  10-18 @ 15:15 Tissue Enterococcus faecalis    Moderate Enterococcus faecalis  Numerous Finegoldia magna "Susceptibilities not performed"  Rare Prevotella disiens "Susceptibilities not performed"  No polymorphonuclear cells seen per low power field  Few-moderate Gram positive cocci in pairs seen per oil power field      WBC Count: 5.11 K/uL (10-24-24 @ 05:30)  WBC Count: 5.94 K/uL (10-23-24 @ 09:36)  WBC Count: 4.66 K/uL (10-22-24 @ 05:22)  WBC Count: 5.27 K/uL (10-21-24 @ 09:10)  WBC Count: 9.35 K/uL (10-20-24 @ 03:36)    Creatinine: 0.53 mg/dL (10-24-24 @ 05:30)  Creatinine: 0.44 mg/dL (10-22-24 @ 05:22)  Creatinine: 0.50 mg/dL (10-21-24 @ 09:10)  Creatinine: 0.61 mg/dL (10-20-24 @ 03:36)  Creatinine: 0.70 mg/dL (10-19-24 @ 16:46)  Creatinine: 0.65 mg/dL (10-19-24 @ 09:12)                       < from: CT Abdomen and Pelvis w/ Oral Cont and w/ IV Cont (10.22.24 @ 18:51) >  ACC: [id number] EXAM:  CT ABDOMEN AND PELVIS OC IC   ORDERED BY: JASON RIVAS     PROCEDURE DATE:  10/22/2024      INTERPRETATION:  CLINICAL INFORMATION: Polymicrobial infection.    COMPARISON: 9/13/2019    CONTRAST/COMPLICATIONS:  IV Contrast: Omnipaque 350  90 cc administered   10 cc discarded  Oral Contrast: Gastroview  Complications: None reported at time of study completion    PROCEDURE:  CT of the Abdomen and Pelvis was performed.  Sagittal and coronal reformats were performed.    FINDINGS:  LOWER CHEST: Trace bilateral pleural effusions. Passive atelectasis at   the lung bases.    LIVER: Subcentimeter lesion right hepatic lobe too small to characterize,   likely a cyst.  BILE DUCTS: Normal caliber.  GALLBLADDER: Within normal limits.  SPLEEN: Within normal limits.  PANCREAS: Within normal limits.  ADRENALS: Within normal limits.  KIDNEYS/URETERS: Bilateral renal cortical scarring. Renal cysts and   lesions too small to characterize. Peripherally calcified right renal   cyst measuring 1.4 cm.    BLADDER: Air, likely related to instrumentation.  REPRODUCTIVE ORGANS: Uterus and bilateral adnexa within normal limits.    BOWEL: Small hiatal hernia. No bowel obstruction. Appendix is not   visualized. Stool throughout the colon.  PERITONEUM/RETROPERITONEUM: Within normal limits.  VESSELS: Atherosclerotic calcifications.  LYMPH NODES: No lymphadenopathy.  ABDOMINAL WALL: Mild subcutaneous edema.  BONES: Osteopenia and degenerative changes. Posterior fusion extending   fromL4 through S1. Bilateral total hip arthroplasty. Fracture of the   right iliac bone with fracture lines still evident. Sclerosis of the   surrounding bony structures.. Processes and nondisplaced fracture is also   present in the left pubic bone.    IMPRESSION:  *  No evidence of acute infectious process in the abdomen and pelvis.  *  Comminuted fracture of the left iliac bone and nondisplaced fracture   of the left pubic bone with surrounding sclerosis of the bony structures,   possibly related to healing. Further evaluation with MRI of the bony   pelvis is recommended to evaluate for potential pathologic fracture.    --- End of Report ---    < end of copied text >       < from: TTE W or WO Ultrasound Enhancing Agent (10.22.24 @ 12:34) >  CONCLUSIONS:      1. Left ventricular cavity is normal in size. Left ventricular systolic function is mildly decreased.   2. Basal and mid inferolateral wall and basal inferior segment are abnormal.   3. Normal right ventricular cavity size and normal right ventricular systolic function.   4. Left atrium is normal in size.   5. No pericardial effusion seen.   6. No left ventricular hypertrophy.    < end of copied text >

## 2024-10-24 NOTE — DISCHARGE NOTE PROVIDER - DETAILS OF MALNUTRITION DIAGNOSIS/DIAGNOSES
This patient has been assessed with a concern for Malnutrition and was treated during this hospitalization for the following Nutrition diagnosis/diagnoses:     -  10/26/2024: Moderate protein-calorie malnutrition

## 2024-10-25 PROCEDURE — 99232 SBSQ HOSP IP/OBS MODERATE 35: CPT

## 2024-10-25 RX ADMIN — Medication 975 MILLIGRAM(S): at 21:44

## 2024-10-25 RX ADMIN — Medication 0.4 MILLIGRAM(S): at 00:59

## 2024-10-25 RX ADMIN — Medication 0.4 MILLIGRAM(S): at 21:44

## 2024-10-25 RX ADMIN — Medication 975 MILLIGRAM(S): at 07:00

## 2024-10-25 RX ADMIN — Medication 50 MILLIGRAM(S): at 06:17

## 2024-10-25 RX ADMIN — AMPICILLIN 200 GRAM(S): 2 INJECTION, POWDER, FOR SOLUTION INTRAVENOUS at 21:44

## 2024-10-25 RX ADMIN — Medication 975 MILLIGRAM(S): at 16:37

## 2024-10-25 RX ADMIN — METRONIDAZOLE 500 MILLIGRAM(S): 250 TABLET ORAL at 16:39

## 2024-10-25 RX ADMIN — METRONIDAZOLE 500 MILLIGRAM(S): 250 TABLET ORAL at 06:22

## 2024-10-25 RX ADMIN — APIXABAN 2.5 MILLIGRAM(S): 5 TABLET, FILM COATED ORAL at 16:38

## 2024-10-25 RX ADMIN — AMPICILLIN 200 GRAM(S): 2 INJECTION, POWDER, FOR SOLUTION INTRAVENOUS at 06:12

## 2024-10-25 RX ADMIN — AMPICILLIN 200 GRAM(S): 2 INJECTION, POWDER, FOR SOLUTION INTRAVENOUS at 16:36

## 2024-10-25 RX ADMIN — PANTOPRAZOLE SODIUM 40 MILLIGRAM(S): 40 TABLET, DELAYED RELEASE ORAL at 06:16

## 2024-10-25 RX ADMIN — Medication 975 MILLIGRAM(S): at 22:44

## 2024-10-25 RX ADMIN — APIXABAN 2.5 MILLIGRAM(S): 5 TABLET, FILM COATED ORAL at 06:25

## 2024-10-25 RX ADMIN — Medication 975 MILLIGRAM(S): at 17:37

## 2024-10-25 RX ADMIN — Medication 5 MILLIGRAM(S): at 06:16

## 2024-10-25 RX ADMIN — Medication 150 MILLIGRAM(S): at 11:11

## 2024-10-25 RX ADMIN — AMPICILLIN 200 GRAM(S): 2 INJECTION, POWDER, FOR SOLUTION INTRAVENOUS at 11:12

## 2024-10-25 RX ADMIN — Medication 975 MILLIGRAM(S): at 06:16

## 2024-10-25 NOTE — PROGRESS NOTE ADULT - NS ATTEND AMEND GEN_ALL_CORE FT
Patient is see and evaluated, chart reviewed in detail, agree with assessment and plan of the NP  patient's pain is well controlled, has no complains  CTA b/l   right knee Joint area is covered with clean dressing, no bleeding or soaking, has drain in place   Will continue with current plan of care  will d/c IV fluids.  has urine retention, will give flomax  for constipation will do bowel meds   will continue with antibiotics and follow cultures
Patient is see and evaluated, chart reviewed in detail, agree with assessment and plan of the NP  patient's pain is well controlled, has no complains  CTA b/l   right knee Joint area is covered with clean dressing, no bleeding or soaking  Will continue with current plan of care  continue antibiotics   joint cultures positive for Entercoccus faecalis, Finegoldia magna, Prevotella disiens  TTE and CT abdomen and Pelvis done came unremarkable   ID is following   patient need colonoscopy as out patient to r/o colon cancer.
Patient is see and evaluated, chart reviewed in detail, agree with assessment and plan of the NP  patient's pain is well controlled, has no complains  CTA b/l   right knee Joint area is covered with clean dressing, no bleeding or soaking  Will continue with current plan of care  continue antibiotics   joint cultures positive for Entercoccus faecalis, Finegoldia magna, Prevotella disiens  TTE and CT abdomen and Pelvis done came unremarkable   ID is following   patient need colonoscopy as out patient to r/o colon cancer.  intermittent tachycardia in setting of Ambulation likely from deconditioning.     Patient is stable from the medicine point of view for discharge pending PT and Ortho eval.
Patient is see and evaluated, chart reviewed in detail, agree with assessment and plan of the NP  patient's pain is well controlled, has no complains  CTA b/l   right knee Joint area is covered with clean dressing, no bleeding or soaking, has drain in place   Will continue with current plan of care  has urine retention, continue Flomax  bowel meds   will continue with antibiotics and follow cultures.  ID recommended TTE and CT abdomen and pelvis

## 2024-10-25 NOTE — PROGRESS NOTE ADULT - ASSESSMENT
Patient is a 89y Female presenting to the emergency department with a chief complaint of right knee open wound with drainage.  Patient had a R TKA revision with extensor mechanism reconstruction, incision and drainage on 7/19/24 with Dr Johnston.  Patient was discharged with PICC line on cefepime.  Patient had been seeing her new ID doctor where she lives in [place] who currently had her on bactrim.  Patient stated that there had been a small open wound since the surgery that had grown in size, it started draining and had a foul odor for 10 days.  Patient sent to ED by Dr Johnston.   Patient was be admitted to orthopedics service for surgery. Started on Meropenem and Daptomycin.   Patient now s/p right knee I/D, poly swap, patellectomy, gastic flap and skin graft 10/18/24.    Infected Right TKA s/p Washout     - VS stable  - abx per ortho/ID   - encouraging incentive spirometry   -c/w local wound care per ortho and plastics   -DVT prophylaxis and Pain meds as per Ortho team   -PT/OT and weight bearing per ortho (WBAT with KI)    -Vancomycin dc'ed as per ID  -Continue Metronidazole 500mg PO q 12hours until 11/28/24  -Continue Ampicillin 2gm IV [id number] until 11/28/24  -follow cultures - Entercoccus faecalis, Finegoldia magna, Prevotella disiens  -TTE with no vegetation  -ABD/Pelvis CT negative   -patient will need outpatient colonoscopy, discussed with patient.    Sinus Tachycardia  Likely secondary to ambulation.  TSH 7.45 - would repeat as outpatient in 4-6 weeks.  EKG with .  Pain control.  Monitor on telemetry.    chronic Anemia: HGB 7.5, s/p one unit PRBC 10/22/24, monitor CBC, now stable.    Urinary retention/Constipation  Improved, voiding without difficulty.    HTN/HLD  -Continue Amlodipine 5 mg daily  -Continue Metoprolol 50 mg BID  -held Valsartan 320 mg daily due to hyperkalemia   -will continue BP meds with holding parameters   -Continue Atorvastatin 20 mg qhs-Patient reports statin was recently d/jose by her doctor    Hx of Left Femoral Vein DVT  -Continue Eliquis   -LE doppler negative for DVT    Depression  -Continue Venlafaxine  mg daily.     Hx of C. Diff  -Continue with Dificid taper, if needed.    Hyponatremia: improved, continue to monitor.     Hyperkalemia: Resolved    Patient medically stable, will sign off.  Please call with questions.   Patient is a 89y Female presenting to the emergency department with a chief complaint of right knee open wound with drainage.  Patient had a R TKA revision with extensor mechanism reconstruction, incision and drainage on 7/19/24 with Dr Johnston.  Patient was discharged with PICC line on cefepime.  Patient had been seeing her new ID doctor where she lives in [place] who currently had her on bactrim.  Patient stated that there had been a small open wound since the surgery that had grown in size, it started draining and had a foul odor for 10 days.  Patient sent to ED by Dr Johnston.   Patient was be admitted to orthopedics service for surgery. Started on Meropenem and Daptomycin.   Patient now s/p right knee I/D, poly swap, patellectomy, gastic flap and skin graft 10/18/24.    Infected Right TKA s/p Washout     - VS stable  - abx per ortho/ID   - encouraging incentive spirometry   -c/w local wound care per ortho and plastics   -DVT prophylaxis and Pain meds as per Ortho team   -PT/OT and weight bearing per ortho (WBAT with KI)    -Vancomycin dc'ed as per ID  -Continue Metronidazole 500mg PO q 12hours until 11/28/24  -Continue Ampicillin 2gm IV [id number] until 11/28/24  -follow cultures - Entercoccus faecalis, Finegoldia magna, Prevotella disiens  -TTE with no vegetation  -ABD/Pelvis CT negative   -patient will need outpatient colonoscopy, discussed with patient.    Sinus Tachycardia  Likely secondary to ambulation.  TSH 7.45 - would repeat as outpatient in 4-6 weeks.  EKG with .  Pain control.  Monitor on telemetry.  like from deconditioning related as tachycardia is in setting with ambulation    chronic Anemia: HGB 7.5, s/p one unit PRBC 10/22/24, monitor CBC, now stable.    Urinary retention/Constipation  Improved, voiding without difficulty.    HTN/HLD  -Continue Amlodipine 5 mg daily  -Continue Metoprolol 50 mg BID  -held Valsartan 320 mg daily due to hyperkalemia   -will continue BP meds with holding parameters   -Continue Atorvastatin 20 mg qhs-Patient reports statin was recently d/jose by her doctor    Hx of Left Femoral Vein DVT  -Continue Eliquis   -LE doppler negative for DVT    Depression  -Continue Venlafaxine  mg daily.     Hx of C. Diff  -Continue with Dificid taper, if needed.    Hyponatremia: improved, continue to monitor.     Hyperkalemia: Resolved    Patient medically stable, will sign off.  Please call with questions.

## 2024-10-25 NOTE — PROGRESS NOTE ADULT - ASSESSMENT
89-year-old female underwent right total knee arthroplasty in  August 2022 at a facility in [place].  After the replacement patient had pain in the right knee.  She was seen by orthopedics during this time.  She lives between [place], [place], [place] and comes to [facility] during some summer months.  She was seen by her orthopedic surgeon in [place], [place] Dr. Garza and underwent revision in December 2023 at which time per the patient the orthopedist (Dr. Garza) did not notice anything abnormal about her knee replacement.  Patient again had another revision in [place] in February 2024.  Postoperatively since February patient was hospitalized in [place] and found to have MSSA bacteremia and was seen by infectious diseases in that hospitalization, underwent IV antibiotic treatment with cefazolin and Rifampin with anticipated end date of April 22, 2024.  She was discharged to a rehab facility and readmitted from the rehab for unclear reasons.  From some documentation the patient has patient had a drug rash because of rifampin and that was discontinued.  Cefazolin was switched to daptomycin.  Patient had another revision of the knee on March 27, 2024.  And was treated with another 6 weeks of antibiotics this time daptomycin which was completed on May 9.  Patient also had multiple echocardiograms last one on May 6 without any evidence of endocarditis.  After completion of intravenous antibiotics patient was placed on Bactrim for 1 month.  She left [place] and went to [place].  She was seen by her infectious disease physician Dr Lila Alvarado in [place], [place]. She followed up here in [place] with Dr Johnston on June 24, 2024 and Antibiotics was discontinued. Patient underwent right knee joint aspiration July 1, 2024 with 11k nucleated cell. Patient had another right knee aspiration on July 10 which the specimen was clotted and was not analyzed by the Lab. Patient was brought to the ER on July 17, 2024 and underwent another arthrocentesis with a cell fluid count of 32, 580 nucleated cells.  Was taken to the OR for Septic PJI Rt knee infection and s/p right TKA 7/19/24, Joint fluid cultures reporting Serratia marcescens, Joint fluid PCR reporting Serratia marcescens and was planned to be on Cefepime till 8/29/24, was discharged July 24, 2024 to a [facility] in [place] and followed with ID physician Dr Lila Alvarado in [place]. Patient was on Bactrim after the IV antibiotics managed by her ID doctor. She also developed C diff initially resolved with PO Vanco followed by 1st reoccurrence and treated with Dificid. Patient states that there has been a small open wound since the surgery that has grown in size, it started draining and had a foul odor for the past 10 days prior to presentation.  Patient sent to ED by Dr Johnston on 10/17.  Patient was be admitted to orthopedics service for surgery. Started on Meropenem and Daptomycin.      PJI  Surgical incision site wound  h/o C diff  s/p right knee I&D with poly exchange, patellectomy & gastroc flap and skin graft  10/18/24      - OR cultures reporting  Enterococcus faecalis, Prevotella disiens and Finegoldia magna  - Continue Metronidazole 500mg PO q 12hours   - Continue Ampicillin 2gm IV [id number]   - Will need Metronidazole and Ampicillin till 11/28/24  - monitor for diarrhea   - TTE 10/22 with no gross veg   - CT A/P with oral and IV contrast 10/22 reporting no acute findings for infection   - Hold off on PICC/Midline for now unless needed for reasons other than infectious diseases  - Patient has an Infectious Diseases physician she follows in [place], Dr Lila Colunga  - Will likely need an eventual colonoscopy   - Follow up cultures  - Trend Fever  - Trend WBC      Will Follow    Discussed treatment plan with: medical team, Clinical pharmacy, Orthopedics and Dr Alvarado

## 2024-10-25 NOTE — PROGRESS NOTE ADULT - SUBJECTIVE AND OBJECTIVE BOX
TABITHA [place]    [id number]    History: 89y Female is status post right total knee I&D with poly swap and complex plastics closure on 10/18/24 POD # 7. Patient is doing well and is comfortable. The patient's pain is controlled using the prescribed pain medications. The patient is participating in physical therapy. Denies/nausea, vomiting, chest pain, shortness of breath, abdominal pain or fever. No new complaints.  Patient culture - poly microbial ID on board                                10.2   5.11  )-----------( 344      ( 24 Oct 2024 05:30 )             31.7   10-24    133[L]  |  99  |  9.3  ----------------------------<  82  3.9   |  23.0  |  0.53    Ca    8.8      24 Oct 2024 05:30  Mg     1.9     10-24    TPro  6.5[L]  /  Alb  2.7[L]  /  TBili  <0.2[L]  /  DBili  x   /  AST  60[H]  /  ALT  76[H]  /  AlkPhos  82  10-24      Culture - Tissue with Gram Stain (10.18.24 @ 15:15)    Gram Stain:   No polymorphonuclear cells seen per low power field  Few Gram positive cocci in pairs seen per oil power field   -  Ampicillin: S <=2 Predicts results to ampicillin/sulbactam, amoxacillin-clavulanate and  piperacillin-tazobactam.   -  Vancomycin: S 2   Specimen Source: Tissue   Culture Results:   Moderate Enterococcus faecalis  Numerous Finegoldia magna "Susceptibilities not performed"   Organism Identification: Enterococcus faecalis   Organism: Enterococcus faecalis   Method Type: SACHIN    Culture - Tissue with Gram Stain (10.18.24 @ 15:15)    Gram Stain:   No polymorphonuclear cells seen seen per low power field  Few Gram positive cocci in pairs seen per oil power field   -  Ampicillin: S <=2 Predicts results to ampicillin/sulbactam, amoxacillin-clavulanate and  piperacillin-tazobactam.   -  Vancomycin: S 2   Specimen Source: Tissue   Culture Results:   Moderate Enterococcus faecalis  Rare Prevotella disiens "Susceptibilities not performed"  Moderate Finegoldia magna "Susceptibilities not performed"   Organism Identification: Enterococcus faecalis   Organism: Enterococcus faecalis   Method Type: SACHIN          Physical exam: The right knee dressing is clean, dry and intact. No drainage or discharge. No erythema is noted. No blistering.  Drain # 1 removed., Drain 3 will remain in.   Dressing removed.  [place] site healing well.  Mild bloody discharge, no erythema or warmth noted.  Primary incision healing well, sutures in place.  No erythema discharge or warmth.  Zeroform applied to graft site.  4x4 over primary incsion with Webril and ace wrap.       Primary Orthopedic Assessment:  • s/p RIGHT total knee replacement I&D with polyswap and complex wound closure         Plan:   • DVT prophylaxis as prescribed of Eliquis   ABX per ID   WBAT in KI for 20 mins at a time   1 drain remaining

## 2024-10-25 NOTE — PROGRESS NOTE ADULT - SUBJECTIVE AND OBJECTIVE BOX
[facility]  INFECTIOUS DISEASES at [place] / [place] / [place]  =======================================================                              Ady Zheng MD                              Professor Emeritus:  Dr Terrence Gong MD            Diplomates American Board of Internal Medicine & Infectious Diseases                                   Tel  [phone] Fax [phone]                                  Hospital Consult line:  [phone]  =======================================================      TABITHA MENDENHALL [id number]    Follow up: PJI    No fevers     s/p right knee I&D with poly exchange, patellectomy & gastroc flap and skin graft  10/18/24      Allergies:  Demerol (Unknown)  fluoroquinolone antibiotics (Other)       REVIEW OF SYSTEMS:  CONSTITUTIONAL:  No Fever or chills  HEENT:  No diplopia or blurred vision.  No earache, sore throat or runny nose.  CARDIOVASCULAR:  No chest pain  RESPIRATORY:  No cough, shortness of breath  GASTROINTESTINAL:  No nausea, vomiting or diarrhea.  GENITOURINARY:  No dysuria, frequency or urgency. No Blood in urine  MUSCULOSKELETAL:  no joint aches, no muscle pain  SKIN:  No change in skin, hair or nails.  NEUROLOGIC:  No Headaches      Physical Exam:  GEN: NAD  HEENT: normocephalic and atraumatic. EOMI. PERRL.    NECK: Supple.   LUNGS: CTA B/L.  HEART: RRR  ABDOMEN: Soft, NT, ND.  +BS.    : No CVA tenderness  EXTREMITIES: Without  edema.  MSK: No joint swelling  NEUROLOGIC: No Focal Deficits   SKIN: No rash      Vitals:  T(F): 98.2 (25 Oct 2024 08:50), Max: 98.5 (25 Oct 2024 04:51)  HR: 97 (25 Oct 2024 08:50)  BP: 128/75 (25 Oct 2024 08:50)  RR: 18 (25 Oct 2024 08:50)  SpO2: 95% (25 Oct 2024 08:50) (95% - 96%)  temp max in last 48H T(F): , Max: 98.5 (10-25-24 @ 04:51)    Current Antibiotics:  ampicillin  IVPB 2 Gram(s) IV Intermittent every 6 hours  metroNIDAZOLE    Tablet 500 milliGRAM(s) Oral every 12 hours    Other medications:  acetaminophen     Tablet .. 975 milliGRAM(s) Oral every 8 hours  amLODIPine   Tablet 5 milliGRAM(s) Oral daily  apixaban 2.5 milliGRAM(s) Oral two times a day  influenza  Vaccine (HIGH DOSE) 0.5 milliLiter(s) IntraMuscular once  metoprolol succinate ER 50 milliGRAM(s) Oral daily  pantoprazole    Tablet 40 milliGRAM(s) Oral before breakfast  polyethylene glycol 3350 17 Gram(s) Oral at bedtime  senna 2 Tablet(s) Oral at bedtime  tamsulosin 0.4 milliGRAM(s) Oral at bedtime  venlafaxine XR. 150 milliGRAM(s) Oral <User Schedule>                            10.2   5.11  )-----------( 344      ( 24 Oct 2024 05:30 )             31.7     10-24    133[L]  |  99  |  9.3  ----------------------------<  82  3.9   |  23.0  |  0.53    Ca    8.8      24 Oct 2024 05:30  Mg     1.9     10-24    TPro  6.5[L]  /  Alb  2.7[L]  /  TBili  <0.2[L]  /  DBili  x   /  AST  60[H]  /  ALT  76[H]  /  AlkPhos  82  10-24    RECENT CULTURES:  10-18 @ 15:15 Tissue Enterococcus faecalis    Moderate Enterococcus faecalis  Numerous Finegoldia magna "Susceptibilities not performed"  Rare Prevotella disiens "Susceptibilities not performed"  No polymorphonuclear cells seen per low power field  Few-moderate Gram positive cocci in pairs seen per oil power field      WBC Count: 5.11 K/uL (10-24-24 @ 05:30)  WBC Count: 5.94 K/uL (10-23-24 @ 09:36)  WBC Count: 4.66 K/uL (10-22-24 @ 05:22)  WBC Count: 5.27 K/uL (10-21-24 @ 09:10)    Creatinine: 0.53 mg/dL (10-24-24 @ 05:30)  Creatinine: 0.44 mg/dL (10-22-24 @ 05:22)  Creatinine: 0.50 mg/dL (10-21-24 @ 09:10)                   < from: CT Abdomen and Pelvis w/ Oral Cont and w/ IV Cont (10.22.24 @ 18:51) >  ACC: [id number] EXAM:  CT ABDOMEN AND PELVIS OC IC   ORDERED BY: JASON RIVAS     PROCEDURE DATE:  10/22/2024      INTERPRETATION:  CLINICAL INFORMATION: Polymicrobial infection.    COMPARISON: 9/13/2019    CONTRAST/COMPLICATIONS:  IV Contrast: Omnipaque 350  90 cc administered   10 cc discarded  Oral Contrast: Gastroview  Complications: None reported at time of study completion    PROCEDURE:  CT of the Abdomen and Pelvis was performed.  Sagittal and coronal reformats were performed.    FINDINGS:  LOWER CHEST: Trace bilateral pleural effusions. Passive atelectasis at   the lung bases.    LIVER: Subcentimeter lesion right hepatic lobe too small to characterize,   likely a cyst.  BILE DUCTS: Normal caliber.  GALLBLADDER: Within normal limits.  SPLEEN: Within normal limits.  PANCREAS: Within normal limits.  ADRENALS: Within normal limits.  KIDNEYS/URETERS: Bilateral renal cortical scarring. Renal cysts and   lesions too small to characterize. Peripherally calcified right renal   cyst measuring 1.4 cm.    BLADDER: Air, likely related to instrumentation.  REPRODUCTIVE ORGANS: Uterus and bilateral adnexa within normal limits.    BOWEL: Small hiatal hernia. No bowel obstruction. Appendix is not   visualized. Stool throughout the colon.  PERITONEUM/RETROPERITONEUM: Within normal limits.  VESSELS: Atherosclerotic calcifications.  LYMPH NODES: No lymphadenopathy.  ABDOMINAL WALL: Mild subcutaneous edema.  BONES: Osteopenia and degenerative changes. Posterior fusion extending   fromL4 through S1. Bilateral total hip arthroplasty. Fracture of the   right iliac bone with fracture lines still evident. Sclerosis of the   surrounding bony structures.. Processes and nondisplaced fracture is also   present in the left pubic bone.    IMPRESSION:  *  No evidence of acute infectious process in the abdomen and pelvis.  *  Comminuted fracture of the left iliac bone and nondisplaced fracture   of the left pubic bone with surrounding sclerosis of the bony structures,   possibly related to healing. Further evaluation with MRI of the bony   pelvis is recommended to evaluate for potential pathologic fracture.    --- End of Report ---    < end of copied text >       < from: TTE W or WO Ultrasound Enhancing Agent (10.22.24 @ 12:34) >  CONCLUSIONS:      1. Left ventricular cavity is normal in size. Left ventricular systolic function is mildly decreased.   2. Basal and mid inferolateral wall and basal inferior segment are abnormal.   3. Normal right ventricular cavity size and normal right ventricular systolic function.   4. Left atrium is normal in size.   5. No pericardial effusion seen.   6. No left ventricular hypertrophy.    < end of copied text >

## 2024-10-25 NOTE — PROGRESS NOTE ADULT - SUBJECTIVE AND OBJECTIVE BOX
CC: right knee I/D, poly swap, patellectomy (23 Oct 2024 09:03)    HPI:  89y Female presenting to the emergency department with a chief complaint of right knee open wound with drainage.  Patient had a R TKA revision with extensor mechanism reconstruction, incision and drainage on 7/19/24 with Dr Johnston.  Patient was discharged with PICC line on cefepime.  Patient has been seeing her new ID doctor where she lives in [place] who currently has her on bactrim.  Patient stated that there had been a small open wound since the surgery that had grown in size, it started draining and had a foul odor for 10 days.  Patient sent to ED by Dr Johnston.  Patient now s/p right knee I/D, poly swap, patellectomy, gastic flap and skin graft 10/18/24.      INTERVAL HPI/OVERNIGHT EVENTS:  Patient seen and examined sitting up in the bed.  Patient reports pain controlled.  Tolerated PT.  Patient denies any headache, dizziness, SOB, CP, abdominal pain, nausea, vomiting, dysuria.  Other ROS reviewed and are negative.    Vital Signs Last 24 Hrs  T(C): 36.8 (25 Oct 2024 08:50), Max: 36.9 (25 Oct 2024 04:51)  T(F): 98.2 (25 Oct 2024 08:50), Max: 98.5 (25 Oct 2024 04:51)  HR: 97 (25 Oct 2024 08:50) (85 - 97)  BP: 128/75 (25 Oct 2024 08:50) (128/75 - 152/70)  BP(mean): --  RR: 18 (25 Oct 2024 08:50) (18 - 18)  SpO2: 95% (25 Oct 2024 08:50) (95% - 96%)    Parameters below as of 25 Oct 2024 08:50  Patient On (Oxygen Delivery Method): room air      I&O's Detail    24 Oct 2024 07:01  -  25 Oct 2024 07:00  --------------------------------------------------------  IN:  Total IN: 0 mL    OUT:    Bulb (mL): 40 mL    Bulb (mL): 5 mL  Total OUT: 45 mL    Total NET: -45 mL      PHYSICAL EXAM:  GENERAL: NAD  HEAD:  Atraumatic, Normocephalic  NECK: Supple, No JVD, Normal thyroid  NERVOUS SYSTEM:  Alert & Oriented X3, Good concentration; Motor Strength 5/5 B/L upper and lower extremities  CHEST/LUNG: Clear to auscultation bilaterally  HEART: Regular rate and rhythm; No murmurs, rubs, or gallops  ABDOMEN: Soft, Nontender, Nondistended; Bowel sounds present  EXTREMITIES:  2+ Peripheral Pulses, RLE with clean dressing and immobilizer and KB x2  SKIN: No rashes or lesions                                10.2   5.11  )-----------( 344      ( 24 Oct 2024 05:30 )             31.7     24 Oct 2024 05:30    133    |  99     |  9.3    ----------------------------<  82     3.9     |  23.0   |  0.53     Ca    8.8        24 Oct 2024 05:30  Mg     1.9       24 Oct 2024 05:30    TPro  6.5    /  Alb  2.7    /  TBili  <0.2   /  DBili  x      /  AST  60     /  ALT  76     /  AlkPhos  82     24 Oct 2024 05:30          LIVER FUNCTIONS - ( 24 Oct 2024 05:30 )  Alb: 2.7 g/dL / Pro: 6.5 g/dL / ALK PHOS: 82 U/L / ALT: 76 U/L / AST: 60 U/L / GGT: x           Urinalysis Basic - ( 24 Oct 2024 05:30 )    Color: x / Appearance: x / SG: x / pH: x  Gluc: 82 mg/dL / Ketone: x  / Bili: x / Urobili: x   Blood: x / Protein: x / Nitrite: x   Leuk Esterase: x / RBC: x / WBC x   Sq Epi: x / Non Sq Epi: x / Bacteria: x        MEDICATIONS  (STANDING):  acetaminophen     Tablet .. 975 milliGRAM(s) Oral every 8 hours  amLODIPine   Tablet 5 milliGRAM(s) Oral daily  ampicillin  IVPB 2 Gram(s) IV Intermittent every 6 hours  apixaban 2.5 milliGRAM(s) Oral two times a day  influenza  Vaccine (HIGH DOSE) 0.5 milliLiter(s) IntraMuscular once  metoprolol succinate ER 50 milliGRAM(s) Oral daily  metroNIDAZOLE    Tablet 500 milliGRAM(s) Oral every 12 hours  pantoprazole    Tablet 40 milliGRAM(s) Oral before breakfast  polyethylene glycol 3350 17 Gram(s) Oral at bedtime  senna 2 Tablet(s) Oral at bedtime  tamsulosin 0.4 milliGRAM(s) Oral at bedtime  venlafaxine XR. 150 milliGRAM(s) Oral <User Schedule>    MEDICATIONS  (PRN):  bisacodyl Suppository 10 milliGRAM(s) Rectal daily PRN Constipation  HYDROmorphone   Tablet 4 milliGRAM(s) Oral every 3 hours PRN Severe Pain (7 - 10)  magnesium hydroxide Suspension 30 milliLiter(s) Oral daily PRN Constipation  metoprolol tartrate Injectable 5 milliGRAM(s) IV Push every 6 hours PRN HR >130  mineral oil enema 133 milliLiter(s) Rectal once PRN if no BM after suppository  ondansetron Injectable 4 milliGRAM(s) IV Push every 6 hours PRN Nausea and/or Vomiting  oxyCODONE    IR 5 milliGRAM(s) Oral every 3 hours PRN Mild Pain (1 - 3)  oxyCODONE    IR 10 milliGRAM(s) Oral every 3 hours PRN Moderate Pain (4 - 6)         CC: right knee I/D, poly swap, patellectomy (23 Oct 2024 09:03)    HPI:  89y Female presenting to the emergency department with a chief complaint of right knee open wound with drainage.  Patient had a R TKA revision with extensor mechanism reconstruction, incision and drainage on 7/19/24 with Dr Johnston.  Patient was discharged with PICC line on cefepime.  Patient has been seeing her new ID doctor where she lives in [place] who currently has her on bactrim.  Patient stated that there had been a small open wound since the surgery that had grown in size, it started draining and had a foul odor for 10 days.  Patient sent to ED by Dr Johnston.  Patient now s/p right knee I/D, poly swap, patellectomy, gastic flap and skin graft 10/18/24.      INTERVAL HPI/OVERNIGHT EVENTS:  Patient seen and examined sitting up in the bed.  Patient reports pain controlled.  Tolerated PT.  Patient denies any headache, dizziness, SOB, CP, abdominal pain, nausea, vomiting, dysuria.  Other ROS reviewed and are negative.    Vital Signs Last 24 Hrs  T(C): 36.8 (25 Oct 2024 08:50), Max: 36.9 (25 Oct 2024 04:51)  T(F): 98.2 (25 Oct 2024 08:50), Max: 98.5 (25 Oct 2024 04:51)  HR: 97 (25 Oct 2024 08:50) (85 - 97)  BP: 128/75 (25 Oct 2024 08:50) (128/75 - 152/70)  BP(mean): --  RR: 18 (25 Oct 2024 08:50) (18 - 18)  SpO2: 95% (25 Oct 2024 08:50) (95% - 96%)    Parameters below as of 25 Oct 2024 08:50  Patient On (Oxygen Delivery Method): room air      I&O's Detail    24 Oct 2024 07:01  -  25 Oct 2024 07:00  --------------------------------------------------------  IN:  Total IN: 0 mL    OUT:    Bulb (mL): 40 mL    Bulb (mL): 5 mL  Total OUT: 45 mL    Total NET: -45 mL      PHYSICAL EXAM:  GENERAL: NAD  HEAD:  Atraumatic, Normocephalic  NECK: Supple, No JVD, Normal thyroid  NERVOUS SYSTEM:  Alert & Oriented X3   CHEST/LUNG: Clear to auscultation bilaterally  HEART: Regular rate and rhythm; No murmurs  ABDOMEN: Soft, Nontender, Nondistended; Bowel sounds present  EXTREMITIES:  2+ Peripheral Pulses, RLE with clean dressing and immobilizer and KB x2  SKIN: No rashes or lesions                                10.2   5.11  )-----------( 344      ( 24 Oct 2024 05:30 )             31.7     24 Oct 2024 05:30    133    |  99     |  9.3    ----------------------------<  82     3.9     |  23.0   |  0.53     Ca    8.8        24 Oct 2024 05:30  Mg     1.9       24 Oct 2024 05:30    TPro  6.5    /  Alb  2.7    /  TBili  <0.2   /  DBili  x      /  AST  60     /  ALT  76     /  AlkPhos  82     24 Oct 2024 05:30          LIVER FUNCTIONS - ( 24 Oct 2024 05:30 )  Alb: 2.7 g/dL / Pro: 6.5 g/dL / ALK PHOS: 82 U/L / ALT: 76 U/L / AST: 60 U/L / GGT: x           Urinalysis Basic - ( 24 Oct 2024 05:30 )    Color: x / Appearance: x / SG: x / pH: x  Gluc: 82 mg/dL / Ketone: x  / Bili: x / Urobili: x   Blood: x / Protein: x / Nitrite: x   Leuk Esterase: x / RBC: x / WBC x   Sq Epi: x / Non Sq Epi: x / Bacteria: x        MEDICATIONS  (STANDING):  acetaminophen     Tablet .. 975 milliGRAM(s) Oral every 8 hours  amLODIPine   Tablet 5 milliGRAM(s) Oral daily  ampicillin  IVPB 2 Gram(s) IV Intermittent every 6 hours  apixaban 2.5 milliGRAM(s) Oral two times a day  influenza  Vaccine (HIGH DOSE) 0.5 milliLiter(s) IntraMuscular once  metoprolol succinate ER 50 milliGRAM(s) Oral daily  metroNIDAZOLE    Tablet 500 milliGRAM(s) Oral every 12 hours  pantoprazole    Tablet 40 milliGRAM(s) Oral before breakfast  polyethylene glycol 3350 17 Gram(s) Oral at bedtime  senna 2 Tablet(s) Oral at bedtime  tamsulosin 0.4 milliGRAM(s) Oral at bedtime  venlafaxine XR. 150 milliGRAM(s) Oral <User Schedule>    MEDICATIONS  (PRN):  bisacodyl Suppository 10 milliGRAM(s) Rectal daily PRN Constipation  HYDROmorphone   Tablet 4 milliGRAM(s) Oral every 3 hours PRN Severe Pain (7 - 10)  magnesium hydroxide Suspension 30 milliLiter(s) Oral daily PRN Constipation  metoprolol tartrate Injectable 5 milliGRAM(s) IV Push every 6 hours PRN HR >130  mineral oil enema 133 milliLiter(s) Rectal once PRN if no BM after suppository  ondansetron Injectable 4 milliGRAM(s) IV Push every 6 hours PRN Nausea and/or Vomiting  oxyCODONE    IR 5 milliGRAM(s) Oral every 3 hours PRN Mild Pain (1 - 3)  oxyCODONE    IR 10 milliGRAM(s) Oral every 3 hours PRN Moderate Pain (4 - 6)

## 2024-10-26 RX ADMIN — PANTOPRAZOLE SODIUM 40 MILLIGRAM(S): 40 TABLET, DELAYED RELEASE ORAL at 04:30

## 2024-10-26 RX ADMIN — Medication 50 MILLIGRAM(S): at 04:44

## 2024-10-26 RX ADMIN — METRONIDAZOLE 500 MILLIGRAM(S): 250 TABLET ORAL at 04:45

## 2024-10-26 RX ADMIN — Medication 975 MILLIGRAM(S): at 05:46

## 2024-10-26 RX ADMIN — AMPICILLIN 200 GRAM(S): 2 INJECTION, POWDER, FOR SOLUTION INTRAVENOUS at 15:09

## 2024-10-26 RX ADMIN — AMPICILLIN 200 GRAM(S): 2 INJECTION, POWDER, FOR SOLUTION INTRAVENOUS at 04:30

## 2024-10-26 RX ADMIN — APIXABAN 2.5 MILLIGRAM(S): 5 TABLET, FILM COATED ORAL at 04:45

## 2024-10-26 RX ADMIN — Medication 0.4 MILLIGRAM(S): at 22:23

## 2024-10-26 RX ADMIN — Medication 975 MILLIGRAM(S): at 04:46

## 2024-10-26 RX ADMIN — Medication 975 MILLIGRAM(S): at 22:23

## 2024-10-26 RX ADMIN — AMPICILLIN 200 GRAM(S): 2 INJECTION, POWDER, FOR SOLUTION INTRAVENOUS at 09:22

## 2024-10-26 RX ADMIN — Medication 5 MILLIGRAM(S): at 04:45

## 2024-10-26 RX ADMIN — AMPICILLIN 200 GRAM(S): 2 INJECTION, POWDER, FOR SOLUTION INTRAVENOUS at 22:23

## 2024-10-26 RX ADMIN — Medication 975 MILLIGRAM(S): at 15:09

## 2024-10-26 RX ADMIN — Medication 975 MILLIGRAM(S): at 23:23

## 2024-10-26 RX ADMIN — Medication 150 MILLIGRAM(S): at 09:22

## 2024-10-26 RX ADMIN — APIXABAN 2.5 MILLIGRAM(S): 5 TABLET, FILM COATED ORAL at 17:32

## 2024-10-26 RX ADMIN — METRONIDAZOLE 500 MILLIGRAM(S): 250 TABLET ORAL at 17:32

## 2024-10-26 NOTE — DIETITIAN INITIAL EVALUATION ADULT - ETIOLOGY
Related to inability to meet sufficient protein energy needs w/ early satiety in setting of advanced age, now s/p I&D R knee wound

## 2024-10-26 NOTE — DIETITIAN INITIAL EVALUATION ADULT - ORAL INTAKE PTA/DIET HISTORY
Pt reports having decreased appetite secondary to early satiety over past few years, pt endorses weight loss; however unable to quantify. Pt currently reports  having fair appetite/PO intake, denies difficulty chewing or swallowing. Encouraged HBV protein foods to promote healing. NFPE conducted.

## 2024-10-26 NOTE — DIETITIAN NUTRITION RISK NOTIFICATION - MUSCLE MASS (LOSS OF MUSCLE)
Patient here for labs  drawn from right antecubital without pain or bruising.    Temples.../Clavicles.../Shoulders...

## 2024-10-26 NOTE — DIETITIAN INITIAL EVALUATION ADULT - OTHER INFO
Pt is a 89 year old Female presenting to the emergency department with a chief complaint of right knee open wound with drainage.  Patient had a R TKA revision with extensor mechanism reconstruction, incision and drainage on 7/19/24 with Dr Johnston.  Patient was discharged with PICC line on cefepime.  Patient has been seeing her new ID doctor where she lives in [place] who currently has her on bactrim.  Patient stated that there had been a small open wound since the surgery that had grown in size, it started draining and had a foul odor for 10 days.  Patient sent to ED by Dr Johnston.  Patient now s/p right knee I/D, poly swap, patellectomy, gastic flap and skin graft 10/18/24.

## 2024-10-26 NOTE — DIETITIAN INITIAL EVALUATION ADULT - PERTINENT MEDS FT
MEDICATIONS  (STANDING):  acetaminophen     Tablet .. 975 milliGRAM(s) Oral every 8 hours  amLODIPine   Tablet 5 milliGRAM(s) Oral daily  ampicillin  IVPB 2 Gram(s) IV Intermittent every 6 hours  apixaban 2.5 milliGRAM(s) Oral two times a day  influenza  Vaccine (HIGH DOSE) 0.5 milliLiter(s) IntraMuscular once  metoprolol succinate ER 50 milliGRAM(s) Oral daily  metroNIDAZOLE    Tablet 500 milliGRAM(s) Oral every 12 hours  pantoprazole    Tablet 40 milliGRAM(s) Oral before breakfast  polyethylene glycol 3350 17 Gram(s) Oral at bedtime  senna 2 Tablet(s) Oral at bedtime  tamsulosin 0.4 milliGRAM(s) Oral at bedtime  venlafaxine XR. 150 milliGRAM(s) Oral <User Schedule>    MEDICATIONS  (PRN):  bisacodyl Suppository 10 milliGRAM(s) Rectal daily PRN Constipation  magnesium hydroxide Suspension 30 milliLiter(s) Oral daily PRN Constipation  metoprolol tartrate Injectable 5 milliGRAM(s) IV Push every 6 hours PRN HR >130  mineral oil enema 133 milliLiter(s) Rectal once PRN if no BM after suppository  ondansetron Injectable 4 milliGRAM(s) IV Push every 6 hours PRN Nausea and/or Vomiting

## 2024-10-26 NOTE — DIETITIAN NUTRITION RISK NOTIFICATION - FINDINGS BASED ON COMPREHENSIVE NUTRITION ASSESSMENT, CONSULTATION PERFORMED ON
26-Oct-2024
Assistance OOB with selected safe patient handling equipment if applicable/Assistance with ambulation/Communicate fall risk and risk factors to all staff, patient, and family/Encourage patient to sit up slowly, dangle for a short time, stand at bedside before walking/Monitor gait and stability/Orthostatic vital signs/Provide visual cue: yellow wristband, yellow gown, etc/Reinforce activity limits and safety measures with patient and family/Call bell, personal items and telephone in reach/Instruct patient to call for assistance before getting out of bed/chair/stretcher/Non-slip footwear applied when patient is off stretcher/Delight to call system/Physically safe environment - no spills, clutter or unnecessary equipment/Purposeful Proactive Rounding/Room/bathroom lighting operational, light cord in reach

## 2024-10-27 RX ADMIN — METRONIDAZOLE 500 MILLIGRAM(S): 250 TABLET ORAL at 18:00

## 2024-10-27 RX ADMIN — Medication 50 MILLIGRAM(S): at 06:10

## 2024-10-27 RX ADMIN — APIXABAN 2.5 MILLIGRAM(S): 5 TABLET, FILM COATED ORAL at 17:59

## 2024-10-27 RX ADMIN — PANTOPRAZOLE SODIUM 40 MILLIGRAM(S): 40 TABLET, DELAYED RELEASE ORAL at 06:10

## 2024-10-27 RX ADMIN — AMPICILLIN 200 GRAM(S): 2 INJECTION, POWDER, FOR SOLUTION INTRAVENOUS at 06:10

## 2024-10-27 RX ADMIN — Medication 150 MILLIGRAM(S): at 09:37

## 2024-10-27 RX ADMIN — Medication 5 MILLIGRAM(S): at 06:10

## 2024-10-27 RX ADMIN — Medication 975 MILLIGRAM(S): at 23:02

## 2024-10-27 RX ADMIN — METRONIDAZOLE 500 MILLIGRAM(S): 250 TABLET ORAL at 06:10

## 2024-10-27 RX ADMIN — Medication 975 MILLIGRAM(S): at 06:09

## 2024-10-27 RX ADMIN — AMPICILLIN 200 GRAM(S): 2 INJECTION, POWDER, FOR SOLUTION INTRAVENOUS at 22:53

## 2024-10-27 RX ADMIN — Medication 0.4 MILLIGRAM(S): at 22:02

## 2024-10-27 RX ADMIN — Medication 975 MILLIGRAM(S): at 22:02

## 2024-10-27 RX ADMIN — AMPICILLIN 200 GRAM(S): 2 INJECTION, POWDER, FOR SOLUTION INTRAVENOUS at 11:54

## 2024-10-27 RX ADMIN — Medication 975 MILLIGRAM(S): at 13:49

## 2024-10-27 RX ADMIN — Medication 975 MILLIGRAM(S): at 14:49

## 2024-10-27 RX ADMIN — APIXABAN 2.5 MILLIGRAM(S): 5 TABLET, FILM COATED ORAL at 06:10

## 2024-10-27 RX ADMIN — AMPICILLIN 200 GRAM(S): 2 INJECTION, POWDER, FOR SOLUTION INTRAVENOUS at 16:43

## 2024-10-27 NOTE — PROGRESS NOTE ADULT - SUBJECTIVE AND OBJECTIVE BOX
TABITHA [place]    [id number]    History: 89y Female is status post right total knee I&D with poly swap, patellectomy, gastroc flap w/ skin graft on POD #9. Patient is doing well and is comfortable. The patient's pain is controlled using the prescribed pain medications. The patient is participating in physical therapy. Denies nausea, vomiting, chest pain, shortness of breath, abdominal pain or fever. No new complaints.    MEDICATIONS  (STANDING):  acetaminophen     Tablet .. 975 milliGRAM(s) Oral every 8 hours  amLODIPine   Tablet 5 milliGRAM(s) Oral daily  ampicillin  IVPB 2 Gram(s) IV Intermittent every 6 hours  apixaban 2.5 milliGRAM(s) Oral two times a day  influenza  Vaccine (HIGH DOSE) 0.5 milliLiter(s) IntraMuscular once  metoprolol succinate ER 50 milliGRAM(s) Oral daily  metroNIDAZOLE    Tablet 500 milliGRAM(s) Oral every 12 hours  pantoprazole    Tablet 40 milliGRAM(s) Oral before breakfast  polyethylene glycol 3350 17 Gram(s) Oral at bedtime  senna 2 Tablet(s) Oral at bedtime  tamsulosin 0.4 milliGRAM(s) Oral at bedtime  venlafaxine XR. 150 milliGRAM(s) Oral <User Schedule>    MEDICATIONS  (PRN):  bisacodyl Suppository 10 milliGRAM(s) Rectal daily PRN Constipation  magnesium hydroxide Suspension 30 milliLiter(s) Oral daily PRN Constipation  metoprolol tartrate Injectable 5 milliGRAM(s) IV Push every 6 hours PRN HR >130  mineral oil enema 133 milliLiter(s) Rectal once PRN if no BM after suppository  ondansetron Injectable 4 milliGRAM(s) IV Push every 6 hours PRN Nausea and/or Vomiting      Physical exam: Knee immobilizer noted. The right knee ACE is clean, dry and intact. Removed. Sutures in place over anterior knee and posterior calf. No active drainage or discharge. Skin graft site over anterior knee intact with intact staples. KB drain to calf removed without complication. No erythema is noted. No blistering. No ecchymosis. New dry dressings placed and ACE/webril, knee immobilizer placed. Right thigh donor site with blood staining noted. The calf is supple nontender. Sensation to light touch is grossly intact distally. Motor function distally is 5/5. Extensor hallucis longus and flexor hallucis longus are intact. No foot drop. 2+ dorsalis pedis pulse. Capillary refill is less than 2 seconds. No cyanosis.    Primary Orthopedic Assessment:  • s/p RIGHT total knee I&D with poly swap, patellectomy, gastroc flap w/ skin graft    Secondary  Orthopedic Assessment(s):   •     Secondary  Medical Assessment(s):   •     Plan:   • DVT prophylaxis as prescribed, including use of compression devices and ankle pumps  • Continue physical therapy  * ABX per ID  * Drain removed, d/w Dr Soriano (plastics) - cont. right thigh donor site dressing for ~1 week  • Weightbearing as tolerated of the right lower extremity in knee immobilizer at all times (20 min or less)  • Incentive spirometry encouraged  • Pain control as clinically indicated  • Discharge planning

## 2024-10-28 PROCEDURE — 99232 SBSQ HOSP IP/OBS MODERATE 35: CPT

## 2024-10-28 PROCEDURE — 99233 SBSQ HOSP IP/OBS HIGH 50: CPT

## 2024-10-28 RX ADMIN — AMPICILLIN 200 GRAM(S): 2 INJECTION, POWDER, FOR SOLUTION INTRAVENOUS at 10:18

## 2024-10-28 RX ADMIN — AMPICILLIN 200 GRAM(S): 2 INJECTION, POWDER, FOR SOLUTION INTRAVENOUS at 21:28

## 2024-10-28 RX ADMIN — Medication 975 MILLIGRAM(S): at 21:28

## 2024-10-28 RX ADMIN — Medication 975 MILLIGRAM(S): at 05:31

## 2024-10-28 RX ADMIN — Medication 0.4 MILLIGRAM(S): at 21:29

## 2024-10-28 RX ADMIN — AMPICILLIN 200 GRAM(S): 2 INJECTION, POWDER, FOR SOLUTION INTRAVENOUS at 15:14

## 2024-10-28 RX ADMIN — AMPICILLIN 200 GRAM(S): 2 INJECTION, POWDER, FOR SOLUTION INTRAVENOUS at 04:49

## 2024-10-28 RX ADMIN — PANTOPRAZOLE SODIUM 40 MILLIGRAM(S): 40 TABLET, DELAYED RELEASE ORAL at 05:32

## 2024-10-28 RX ADMIN — Medication 975 MILLIGRAM(S): at 22:28

## 2024-10-28 RX ADMIN — Medication 975 MILLIGRAM(S): at 13:46

## 2024-10-28 RX ADMIN — Medication 5 MILLIGRAM(S): at 05:32

## 2024-10-28 RX ADMIN — METRONIDAZOLE 500 MILLIGRAM(S): 250 TABLET ORAL at 05:32

## 2024-10-28 RX ADMIN — APIXABAN 2.5 MILLIGRAM(S): 5 TABLET, FILM COATED ORAL at 05:32

## 2024-10-28 RX ADMIN — Medication 50 MILLIGRAM(S): at 05:32

## 2024-10-28 RX ADMIN — Medication 975 MILLIGRAM(S): at 06:35

## 2024-10-28 RX ADMIN — APIXABAN 2.5 MILLIGRAM(S): 5 TABLET, FILM COATED ORAL at 18:10

## 2024-10-28 RX ADMIN — Medication 150 MILLIGRAM(S): at 08:19

## 2024-10-28 RX ADMIN — METRONIDAZOLE 500 MILLIGRAM(S): 250 TABLET ORAL at 18:10

## 2024-10-28 NOTE — PROGRESS NOTE ADULT - NUTRITIONAL ASSESSMENT
This patient has been assessed with a concern for Malnutrition and has been determined to have a diagnosis/diagnoses of Moderate protein-calorie malnutrition.    This patient is being managed with:   Diet Regular-  Entered: Oct 21 2024  9:53AM  
This patient has been assessed with a concern for Malnutrition and has been determined to have a diagnosis/diagnoses of Moderate protein-calorie malnutrition.    This patient is being managed with:   Diet Regular-  Entered: Oct 21 2024  9:53AM

## 2024-10-28 NOTE — PROGRESS NOTE ADULT - SUBJECTIVE AND OBJECTIVE BOX
Pt Name: TABITHA MENDENHALL  MRN: [id number]    Patient is a 89y Female being followed for status post right total knee I&D with poly swap, patellectomy, gastroc flap w/ skin graft on POD #10. Patient is doing well and is comfortable. The patient's pain is controlled using the prescribed pain medications. The patient is participating in physical therapy. Denies nausea, vomiting, chest pain, shortness of breath, abdominal pain or fever. No new complaints.        Vital Signs Last 24 Hrs  T(C): 36.9 (28 Oct 2024 05:02), Max: 36.9 (27 Oct 2024 08:15)  T(F): 98.4 (28 Oct 2024 05:02), Max: 98.5 (27 Oct 2024 08:15)  HR: 86 (28 Oct 2024 05:02) (86 - 99)  BP: 147/62 (28 Oct 2024 05:02) (116/62 - 147/62)  BP(mean): --  RR: 18 (28 Oct 2024 05:02) (16 - 18)  SpO2: 94% (28 Oct 2024 05:02) (94% - 95%)    Parameters below as of 28 Oct 2024 05:02  Patient On (Oxygen Delivery Method): room air      Daily     Daily         PHYSICAL EXAM:    Appearance: Alert, responsive, in no acute distress.  MSK:  Knee immobilizer noted. The right knee ACE is clean, dry and intact. Right thigh donor site with blood staining noted. The calf is supple nontender. Sensation to light touch is grossly intact distally. Motor function distally is 5/5. Extensor hallucis longus and flexor hallucis longus are intact. No foot drop. 2+ dorsalis pedis pulse. Capillary refill is less than 2 seconds. No cyanosis.      A/P:  Pt is a  89y Female status post right total knee I&D with poly swap, patellectomy, gastroc flap w/ skin graft on POD #10    Plan:   • DVT prophylaxis as prescribed, including use of compression devices and ankle pumps  • Continue physical therapy  * ABX per ID  * Drain removed, d/w Dr Soriano (plastics) - cont. right thigh donor site dressing for ~1 week  • Weightbearing as tolerated of the right lower extremity in knee immobilizer at all times (20 min or less)  • Incentive spirometry encouraged  • Pain control as clinically indicated  • Discharge planning

## 2024-10-28 NOTE — PROGRESS NOTE ADULT - ASSESSMENT
Patient is a 89y Female presenting to the emergency department with a chief complaint of right knee open wound with drainage.  Patient had a R TKA revision with extensor mechanism reconstruction, incision and drainage on 7/19/24 with Dr Johnston.  Patient was discharged with PICC line on cefepime.  Patient had been seeing her new ID doctor where she lives in [place] who currently had her on bactrim.  Patient stated that there had been a small open wound since the surgery that had grown in size, it started draining and had a foul odor for 10 days.  Patient sent to ED by Dr Johnston.   Patient was be admitted to orthopedics service for surgery. Started on Meropenem and Daptomycin.   Patient now s/p right knee I/D, poly swap, patellectomy, gastic flap and skin graft 10/18/24.    Plan:     Infected Right TKA s/p Washout     - VS stable  - abx per ortho/ID   - encouraging incentive spirometry   -c/w local wound care per ortho and plastics   -DVT prophylaxis and Pain meds as per Ortho team   -PT/OT and weight bearing per ortho (WBAT with KI)    -Vancomycin dc'ed as per ID  -Continue Metronidazole 500mg PO q 12hours until 11/28/24  -Continue Ampicillin 2gm IV [id number] until 11/28/24  -follow cultures - Entercoccus faecalis, Finegoldia magna, Prevotella disiens  -TTE with no vegetation  -ABD/Pelvis CT negative   -patient will need outpatient colonoscopy, discussed with patient.    Sinus Tachycardia:   Likely secondary to ambulation.  TSH 7.45 - would repeat as outpatient in 4-6 weeks.  EKG with .  Pain control.  Monitor on telemetry.  like from deconditioning related as tachycardia is in setting with ambulation  now better     chronic Anemia: HGB 7.5, s/p one unit PRBC 10/22/24, Hb is around 10 now, monitor CBC, now stable.    Urinary retention/Constipation  Improved, voiding without difficulty.    HTN/HLD  -Continue Amlodipine 5 mg daily  -Continue Metoprolol 50 mg BID  -held Valsartan 320 mg daily due to hyperkalemia, would continue to hold  -will continue BP meds with holding parameters   -Continue Atorvastatin 20 mg qhs-Patient reports statin was recently d/jose by her doctor    Hx of Left Femoral Vein DVT  -Continue Eliquis   -LE doppler negative for DVT    Depression  -Continue Venlafaxine  mg daily.     Hx of C. Diff  -Continue with Dificid taper, if needed.    Hyponatremia: improved, continue to monitor.     Hyperkalemia: Resolved    Patient medically stable, will sign off.  Please call with questions.

## 2024-10-28 NOTE — PROGRESS NOTE ADULT - ASSESSMENT
89-year-old female underwent right total knee arthroplasty in  August 2022 at a facility in [place].  After the replacement patient had pain in the right knee.  She was seen by orthopedics during this time.  She lives between [place], [place], [place] and comes to [facility] during some summer months.  She was seen by her orthopedic surgeon in [place], [place] Dr. Garza and underwent revision in December 2023 at which time per the patient the orthopedist (Dr. Garza) did not notice anything abnormal about her knee replacement.  Patient again had another revision in [place] in February 2024.  Postoperatively since February patient was hospitalized in [place] and found to have MSSA bacteremia and was seen by infectious diseases in that hospitalization, underwent IV antibiotic treatment with cefazolin and Rifampin with anticipated end date of April 22, 2024.  She was discharged to a rehab facility and readmitted from the rehab for unclear reasons.  From some documentation the patient has patient had a drug rash because of rifampin and that was discontinued.  Cefazolin was switched to daptomycin.  Patient had another revision of the knee on March 27, 2024.  And was treated with another 6 weeks of antibiotics this time daptomycin which was completed on May 9.  Patient also had multiple echocardiograms last one on May 6 without any evidence of endocarditis.  After completion of intravenous antibiotics patient was placed on Bactrim for 1 month.  She left [place] and went to [place].  She was seen by her infectious disease physician Dr Lila Alvarado in [place], [place]. She followed up here in [place] with Dr Johnston on June 24, 2024 and Antibiotics was discontinued. Patient underwent right knee joint aspiration July 1, 2024 with 11k nucleated cell. Patient had another right knee aspiration on July 10 which the specimen was clotted and was not analyzed by the Lab. Patient was brought to the ER on July 17, 2024 and underwent another arthrocentesis with a cell fluid count of 32, 580 nucleated cells.  Was taken to the OR for Septic PJI Rt knee infection and s/p right TKA 7/19/24, Joint fluid cultures reporting Serratia marcescens, Joint fluid PCR reporting Serratia marcescens and was planned to be on Cefepime till 8/29/24, was discharged July 24, 2024 to a [facility] in [place] and followed with ID physician Dr Lila Alvarado in [place]. Patient was on Bactrim after the IV antibiotics managed by her ID doctor. She also developed C diff initially resolved with PO Vanco followed by 1st reoccurrence and treated with Dificid. Patient states that there has been a small open wound since the surgery that has grown in size, it started draining and had a foul odor for the past 10 days prior to presentation.  Patient sent to ED by Dr Johnston on 10/17.  Patient was be admitted to orthopedics service for surgery. Started on Meropenem and Daptomycin.      PJI  Surgical incision site wound  h/o C diff  s/p right knee I&D with poly exchange, patellectomy & gastroc flap and skin graft  10/18/24      - OR cultures reporting  Enterococcus faecalis, Prevotella disiens and Finegoldia magna  - Continue Metronidazole 500mg PO q 12hours   - Continue Ampicillin 2gm IV [id number]   - Will need Metronidazole and Ampicillin till 11/28/24  - monitor for diarrhea   - TTE 10/22 with no gross veg   - CT A/P with oral and IV contrast 10/22 reporting no acute findings for infection   - Hold off on PICC/Midline for now unless needed for reasons other than infectious diseases  - Patient has an Infectious Diseases physician she follows in [place], Dr Lila Colunga  - Will likely need an eventual colonoscopy   - Discussed PICC line procedure with the patient and IV antibiotic administration   - Will plan for PICC  - Patient will be on IV antibiotics at Rehab facility and be followed by  Dr Lila Colunga   - Follow up cultures  - Trend Fever  - Trend WBC      Will Follow    Discussed treatment plan with: medical team, Clinical pharmacy, Orthopedics and Dr Alvarado

## 2024-10-28 NOTE — PROGRESS NOTE ADULT - SUBJECTIVE AND OBJECTIVE BOX
TABITHA [place]    [id number]    89y      Female    Patient is a 89y old  Female who presents with a chief complaint of Infection and inflammatory reaction due to other internal joint prosthesis, initial encounter     (26 Oct 2024 15:40)      INTERVAL HPI/OVERNIGHT EVENTS:    her pain is well controlled, denies fever, chills, chest pain, sob, dizziness       Vital Signs Last 24 Hrs  T(C): 36.6 (28 Oct 2024 08:15), Max: 36.9 (27 Oct 2024 16:10)  T(F): 97.9 (28 Oct 2024 08:15), Max: 98.5 (27 Oct 2024 16:10)  HR: 70 (28 Oct 2024 08:15) (70 - 98)  BP: 124/62 (28 Oct 2024 08:15) (124/62 - 147/62)  RR: 18 (28 Oct 2024 08:15) (16 - 18)  SpO2: 99% (28 Oct 2024 08:15) (94% - 99%)    Parameters below as of 28 Oct 2024 08:15  Patient On (Oxygen Delivery Method): room air        PHYSICAL EXAM:    GENERAL: Elderly female looking comfortable   HEENT: PERRL, +EOMI  NECK: soft, Supple, No JVD  CHEST/LUNG: Clear to auscultate bilaterally; No wheezing  HEART: S1S2+, Regular rate and rhythm; No murmurs  ABDOMEN: Soft, Nontender, Nondistended; Bowel sounds present  EXTREMITIES:  1+ Peripheral Pulses, No edema, right knee has clean dressings on   SKIN: No rashes or lesions  NEURO: AAOX3  PSYCH: normal mood      I&O's Summary    27 Oct 2024 07:01  -  28 Oct 2024 07:00  --------------------------------------------------------  IN: 1570 mL / OUT: 780 mL / NET: 790 mL        MEDICATIONS  (STANDING):  acetaminophen     Tablet .. 975 milliGRAM(s) Oral every 8 hours  amLODIPine   Tablet 5 milliGRAM(s) Oral daily  ampicillin  IVPB 2 Gram(s) IV Intermittent every 6 hours  apixaban 2.5 milliGRAM(s) Oral two times a day  influenza  Vaccine (HIGH DOSE) 0.5 milliLiter(s) IntraMuscular once  metoprolol succinate ER 50 milliGRAM(s) Oral daily  metroNIDAZOLE    Tablet 500 milliGRAM(s) Oral every 12 hours  pantoprazole    Tablet 40 milliGRAM(s) Oral before breakfast  polyethylene glycol 3350 17 Gram(s) Oral at bedtime  senna 2 Tablet(s) Oral at bedtime  tamsulosin 0.4 milliGRAM(s) Oral at bedtime  venlafaxine XR. 150 milliGRAM(s) Oral <User Schedule>    MEDICATIONS  (PRN):  bisacodyl Suppository 10 milliGRAM(s) Rectal daily PRN Constipation  magnesium hydroxide Suspension 30 milliLiter(s) Oral daily PRN Constipation  metoprolol tartrate Injectable 5 milliGRAM(s) IV Push every 6 hours PRN HR >130  mineral oil enema 133 milliLiter(s) Rectal once PRN if no BM after suppository  ondansetron Injectable 4 milliGRAM(s) IV Push every 6 hours PRN Nausea and/or Vomiting

## 2024-10-28 NOTE — PROGRESS NOTE ADULT - SUBJECTIVE AND OBJECTIVE BOX
[facility]  INFECTIOUS DISEASES at [place] / [place] / [place]  =======================================================                              Ady Zheng MD                              Professor Emeritus:  Dr Terrence Gong MD            Diplomates American Board of Internal Medicine & Infectious Diseases                                   Tel  [phone] Fax [phone]                                  Hospital Consult line:  [phone]  =======================================================      TABITHA MENDENHALL [id number]    Follow up: PJI    No fevers     s/p right knee I&D with poly exchange, patellectomy & gastroc flap and skin graft  10/18/24      Allergies:  Demerol (Unknown)  fluoroquinolone antibiotics (Other)       REVIEW OF SYSTEMS:  CONSTITUTIONAL:  No Fever or chills  HEENT:  No diplopia or blurred vision.  No earache, sore throat or runny nose.  CARDIOVASCULAR:  No chest pain  RESPIRATORY:  No cough, shortness of breath  GASTROINTESTINAL:  No nausea, vomiting or diarrhea.  GENITOURINARY:  No dysuria, frequency or urgency. No Blood in urine  MUSCULOSKELETAL:  no joint aches, no muscle pain  SKIN:  No change in skin, hair or nails.  NEUROLOGIC:  No Headaches      Physical Exam:  GEN: NAD  HEENT: normocephalic and atraumatic. EOMI. PERRL.    NECK: Supple.   LUNGS: CTA B/L.  HEART: RRR  ABDOMEN: Soft, NT, ND.  +BS.    : No CVA tenderness  EXTREMITIES: Without  edema.  MSK: No joint swelling  NEUROLOGIC: No Focal Deficits   SKIN: No rash        Vitals:  T(F): 97.9 (28 Oct 2024 08:15), Max: 98.4 (28 Oct 2024 05:02)  HR: 70 (28 Oct 2024 08:15)  BP: 124/62 (28 Oct 2024 08:15)  RR: 18 (28 Oct 2024 08:15)  SpO2: 99% (28 Oct 2024 08:15) (94% - 99%)  temp max in last 48H T(F): , Max: 98.5 (10-27-24 @ 08:15)    Current Antibiotics:  ampicillin  IVPB 2 Gram(s) IV Intermittent every 6 hours  metroNIDAZOLE    Tablet 500 milliGRAM(s) Oral every 12 hours    Other medications:  acetaminophen     Tablet .. 975 milliGRAM(s) Oral every 8 hours  amLODIPine   Tablet 5 milliGRAM(s) Oral daily  apixaban 2.5 milliGRAM(s) Oral two times a day  influenza  Vaccine (HIGH DOSE) 0.5 milliLiter(s) IntraMuscular once  metoprolol succinate ER 50 milliGRAM(s) Oral daily  pantoprazole    Tablet 40 milliGRAM(s) Oral before breakfast  polyethylene glycol 3350 17 Gram(s) Oral at bedtime  senna 2 Tablet(s) Oral at bedtime  tamsulosin 0.4 milliGRAM(s) Oral at bedtime  venlafaxine XR. 150 milliGRAM(s) Oral <User Schedule>      RECENT CULTURES:  10-18 @ 15:15 Tissue Enterococcus faecalis    Moderate Enterococcus faecalis  Numerous Finegoldia magna "Susceptibilities not performed"  Rare Prevotella disiens "Susceptibilities not performed"  No polymorphonuclear cells seen per low power field  Few-moderate Gram positive cocci in pairs seen per oil power field      WBC Count: 5.11 K/uL (10-24-24 @ 05:30)    Creatinine: 0.53 mg/dL (10-24-24 @ 05:30)                            < from: CT Abdomen and Pelvis w/ Oral Cont and w/ IV Cont (10.22.24 @ 18:51) >  ACC: [id number] EXAM:  CT ABDOMEN AND PELVIS OC IC   ORDERED BY: JASON RIVAS     PROCEDURE DATE:  10/22/2024      INTERPRETATION:  CLINICAL INFORMATION: Polymicrobial infection.    COMPARISON: 9/13/2019    CONTRAST/COMPLICATIONS:  IV Contrast: Omnipaque 350  90 cc administered   10 cc discarded  Oral Contrast: Gastroview  Complications: None reported at time of study completion    PROCEDURE:  CT of the Abdomen and Pelvis was performed.  Sagittal and coronal reformats were performed.    FINDINGS:  LOWER CHEST: Trace bilateral pleural effusions. Passive atelectasis at   the lung bases.    LIVER: Subcentimeter lesion right hepatic lobe too small to characterize,   likely a cyst.  BILE DUCTS: Normal caliber.  GALLBLADDER: Within normal limits.  SPLEEN: Within normal limits.  PANCREAS: Within normal limits.  ADRENALS: Within normal limits.  KIDNEYS/URETERS: Bilateral renal cortical scarring. Renal cysts and   lesions too small to characterize. Peripherally calcified right renal   cyst measuring 1.4 cm.    BLADDER: Air, likely related to instrumentation.  REPRODUCTIVE ORGANS: Uterus and bilateral adnexa within normal limits.    BOWEL: Small hiatal hernia. No bowel obstruction. Appendix is not   visualized. Stool throughout the colon.  PERITONEUM/RETROPERITONEUM: Within normal limits.  VESSELS: Atherosclerotic calcifications.  LYMPH NODES: No lymphadenopathy.  ABDOMINAL WALL: Mild subcutaneous edema.  BONES: Osteopenia and degenerative changes. Posterior fusion extending   fromL4 through S1. Bilateral total hip arthroplasty. Fracture of the   right iliac bone with fracture lines still evident. Sclerosis of the   surrounding bony structures.. Processes and nondisplaced fracture is also   present in the left pubic bone.    IMPRESSION:  *  No evidence of acute infectious process in the abdomen and pelvis.  *  Comminuted fracture of the left iliac bone and nondisplaced fracture   of the left pubic bone with surrounding sclerosis of the bony structures,   possibly related to healing. Further evaluation with MRI of the bony   pelvis is recommended to evaluate for potential pathologic fracture.    --- End of Report ---    < end of copied text >       < from: TTE W or WO Ultrasound Enhancing Agent (10.22.24 @ 12:34) >  CONCLUSIONS:      1. Left ventricular cavity is normal in size. Left ventricular systolic function is mildly decreased.   2. Basal and mid inferolateral wall and basal inferior segment are abnormal.   3. Normal right ventricular cavity size and normal right ventricular systolic function.   4. Left atrium is normal in size.   5. No pericardial effusion seen.   6. No left ventricular hypertrophy.    < end of copied text >

## 2024-10-29 ENCOUNTER — TRANSCRIPTION ENCOUNTER (OUTPATIENT)
Age: 89
End: 2024-10-29

## 2024-10-29 VITALS
HEART RATE: 85 BPM | DIASTOLIC BLOOD PRESSURE: 61 MMHG | SYSTOLIC BLOOD PRESSURE: 130 MMHG | OXYGEN SATURATION: 94 % | RESPIRATION RATE: 18 BRPM | TEMPERATURE: 98 F

## 2024-10-29 PROCEDURE — 99232 SBSQ HOSP IP/OBS MODERATE 35: CPT

## 2024-10-29 PROCEDURE — 71045 X-RAY EXAM CHEST 1 VIEW: CPT | Mod: 26

## 2024-10-29 RX ORDER — PANTOPRAZOLE SODIUM 40 MG/1
1 TABLET, DELAYED RELEASE ORAL
Qty: 0 | Refills: 0 | DISCHARGE
Start: 2024-10-29

## 2024-10-29 RX ORDER — SODIUM CHLORIDE 9 MG/ML
10 INJECTION, SOLUTION INTRAMUSCULAR; INTRAVENOUS; SUBCUTANEOUS
Refills: 0 | Status: DISCONTINUED | OUTPATIENT
Start: 2024-10-29 | End: 2024-10-29

## 2024-10-29 RX ORDER — CHLORHEXIDINE GLUCONATE 40 MG/ML
1 SOLUTION TOPICAL
Refills: 0 | Status: DISCONTINUED | OUTPATIENT
Start: 2024-10-29 | End: 2024-10-29

## 2024-10-29 RX ORDER — ACETAMINOPHEN 500 MG
3 TABLET ORAL
Qty: 0 | Refills: 0 | DISCHARGE
Start: 2024-10-29

## 2024-10-29 RX ORDER — TAMSULOSIN HCL 0.4 MG
1 CAPSULE ORAL
Qty: 0 | Refills: 0 | DISCHARGE
Start: 2024-10-29

## 2024-10-29 RX ORDER — AMPICILLIN 2 G/1
2 INJECTION, POWDER, FOR SOLUTION INTRAVENOUS
Qty: 0 | Refills: 0 | DISCHARGE
Start: 2024-10-29 | End: 2024-11-28

## 2024-10-29 RX ORDER — SENNA 187 MG
2 TABLET ORAL
Qty: 0 | Refills: 0 | DISCHARGE
Start: 2024-10-29

## 2024-10-29 RX ORDER — METRONIDAZOLE 250 MG/1
1 TABLET ORAL
Qty: 0 | Refills: 0 | DISCHARGE
Start: 2024-10-29 | End: 2024-11-28

## 2024-10-29 RX ADMIN — METRONIDAZOLE 500 MILLIGRAM(S): 250 TABLET ORAL at 05:06

## 2024-10-29 RX ADMIN — PANTOPRAZOLE SODIUM 40 MILLIGRAM(S): 40 TABLET, DELAYED RELEASE ORAL at 05:06

## 2024-10-29 RX ADMIN — Medication 150 MILLIGRAM(S): at 08:26

## 2024-10-29 RX ADMIN — Medication 50 MILLIGRAM(S): at 05:05

## 2024-10-29 RX ADMIN — Medication 975 MILLIGRAM(S): at 05:05

## 2024-10-29 RX ADMIN — Medication 5 MILLIGRAM(S): at 05:06

## 2024-10-29 RX ADMIN — APIXABAN 2.5 MILLIGRAM(S): 5 TABLET, FILM COATED ORAL at 05:06

## 2024-10-29 RX ADMIN — AMPICILLIN 200 GRAM(S): 2 INJECTION, POWDER, FOR SOLUTION INTRAVENOUS at 10:39

## 2024-10-29 RX ADMIN — AMPICILLIN 200 GRAM(S): 2 INJECTION, POWDER, FOR SOLUTION INTRAVENOUS at 03:54

## 2024-10-29 RX ADMIN — Medication 975 MILLIGRAM(S): at 06:05

## 2024-10-29 RX ADMIN — CHLORHEXIDINE GLUCONATE 1 APPLICATION(S): 40 SOLUTION TOPICAL at 11:42

## 2024-10-29 NOTE — PROGRESS NOTE ADULT - ASSESSMENT
89-year-old female underwent right total knee arthroplasty in  August 2022 at a facility in [place].  After the replacement patient had pain in the right knee.  She was seen by orthopedics during this time.  She lives between [place], [place], [place] and comes to [facility] during some summer months.  She was seen by her orthopedic surgeon in [place], [place] Dr. Garza and underwent revision in December 2023 at which time per the patient the orthopedist (Dr. Garza) did not notice anything abnormal about her knee replacement.  Patient again had another revision in [place] in February 2024.  Postoperatively since February patient was hospitalized in [place] and found to have MSSA bacteremia and was seen by infectious diseases in that hospitalization, underwent IV antibiotic treatment with cefazolin and Rifampin with anticipated end date of April 22, 2024.  She was discharged to a rehab facility and readmitted from the rehab for unclear reasons.  From some documentation the patient has patient had a drug rash because of rifampin and that was discontinued.  Cefazolin was switched to daptomycin.  Patient had another revision of the knee on March 27, 2024.  And was treated with another 6 weeks of antibiotics this time daptomycin which was completed on May 9.  Patient also had multiple echocardiograms last one on May 6 without any evidence of endocarditis.  After completion of intravenous antibiotics patient was placed on Bactrim for 1 month.  She left [place] and went to [place].  She was seen by her infectious disease physician Dr Lila Alvarado in [place], [place]. She followed up here in [place] with Dr Johnston on June 24, 2024 and Antibiotics was discontinued. Patient underwent right knee joint aspiration July 1, 2024 with 11k nucleated cell. Patient had another right knee aspiration on July 10 which the specimen was clotted and was not analyzed by the Lab. Patient was brought to the ER on July 17, 2024 and underwent another arthrocentesis with a cell fluid count of 32, 580 nucleated cells.  Was taken to the OR for Septic PJI Rt knee infection and s/p right TKA 7/19/24, Joint fluid cultures reporting Serratia marcescens, Joint fluid PCR reporting Serratia marcescens and was planned to be on Cefepime till 8/29/24, was discharged July 24, 2024 to a [facility] in [place] and followed with ID physician Dr Lila Alvarado in [place]. Patient was on Bactrim after the IV antibiotics managed by her ID doctor. She also developed C diff initially resolved with PO Vanco followed by 1st reoccurrence and treated with Dificid. Patient states that there has been a small open wound since the surgery that has grown in size, it started draining and had a foul odor for the past 10 days prior to presentation.  Patient sent to ED by Dr Johnston on 10/17.  Patient was be admitted to orthopedics service for surgery. Started on Meropenem and Daptomycin.      PJI  Surgical incision site wound  h/o C diff  s/p right knee I&D with poly exchange, patellectomy & gastroc flap and skin graft  10/18/24      - OR cultures reporting  Enterococcus faecalis, Prevotella disiens and Finegoldia magna  - Continue Metronidazole 500mg PO q 12hours   - Continue Ampicillin 2gm IV [id number]   - Will need Metronidazole and Ampicillin till 11/28/24  - monitor for diarrhea   - TTE 10/22 with no gross veg   - CT A/P with oral and IV contrast 10/22 reporting no acute findings for infection   - Hold off on PICC/Midline for now unless needed for reasons other than infectious diseases  - Patient has an Infectious Diseases physician she follows in [place], Dr Lila Colunga  - Will likely need an eventual colonoscopy   - s/p PICC  - Patient will be on IV antibiotics at Rehab facility and be followed by  Dr Lila Colunga   - Follow up cultures  - Trend Fever  - Trend WBC      Will Follow    Discussed treatment plan with: medical team, Clinical pharmacy, Orthopedics and Dr Alvarado

## 2024-10-29 NOTE — DISCHARGE NOTE NURSING/CASE MANAGEMENT/SOCIAL WORK - NSDCPEFALRISK_GEN_ALL_CORE
For information on Fall & Injury Prevention, visit: [email or web address] OR  [email or web address] OR  https://www.cdc.gov/steadi/patient.html

## 2024-10-29 NOTE — PROGRESS NOTE ADULT - SUBJECTIVE AND OBJECTIVE BOX
[facility]  INFECTIOUS DISEASES at [place] / [place] / [place]  =======================================================                              Ady Zheng MD                              Professor Emeritus:  Dr Terrence Gong MD            Diplomates American Board of Internal Medicine & Infectious Diseases                                   Tel  [phone] Fax [phone]                                  Hospital Consult line:  [phone]  =======================================================      TABITHA MENDENHALL [id number]    Follow up: PJI    No fevers     s/p right knee I&D with poly exchange, patellectomy & gastroc flap and skin graft  10/18/24      Allergies:  Demerol (Unknown)  fluoroquinolone antibiotics (Other)       REVIEW OF SYSTEMS:  CONSTITUTIONAL:  No Fever or chills  HEENT:  No diplopia or blurred vision.  No earache, sore throat or runny nose.  CARDIOVASCULAR:  No chest pain  RESPIRATORY:  No cough, shortness of breath  GASTROINTESTINAL:  No nausea, vomiting or diarrhea.  GENITOURINARY:  No dysuria, frequency or urgency. No Blood in urine  MUSCULOSKELETAL:  no joint aches, no muscle pain  SKIN:  No change in skin, hair or nails.  NEUROLOGIC:  No Headaches      Physical Exam:  GEN: NAD  HEENT: normocephalic and atraumatic. EOMI. PERRL.    NECK: Supple.   LUNGS: CTA B/L.  HEART: RRR  ABDOMEN: Soft, NT, ND.  +BS.    : No CVA tenderness  EXTREMITIES: Without  edema.  MSK: No joint swelling  NEUROLOGIC: No Focal Deficits   SKIN: No rash        Vitals:  T(F): 97.9 (28 Oct 2024 08:15), Max: 98.4 (28 Oct 2024 05:02)  HR: 70 (28 Oct 2024 08:15)  BP: 124/62 (28 Oct 2024 08:15)  RR: 18 (28 Oct 2024 08:15)  SpO2: 99% (28 Oct 2024 08:15) (94% - 99%)  temp max in last 48H T(F): , Max: 98.5 (10-27-24 @ 08:15)    Current Antibiotics:  ampicillin  IVPB 2 Gram(s) IV Intermittent every 6 hours  metroNIDAZOLE    Tablet 500 milliGRAM(s) Oral every 12 hours    Other medications:  acetaminophen     Tablet .. 975 milliGRAM(s) Oral every 8 hours  amLODIPine   Tablet 5 milliGRAM(s) Oral daily  apixaban 2.5 milliGRAM(s) Oral two times a day  influenza  Vaccine (HIGH DOSE) 0.5 milliLiter(s) IntraMuscular once  metoprolol succinate ER 50 milliGRAM(s) Oral daily  pantoprazole    Tablet 40 milliGRAM(s) Oral before breakfast  polyethylene glycol 3350 17 Gram(s) Oral at bedtime  senna 2 Tablet(s) Oral at bedtime  tamsulosin 0.4 milliGRAM(s) Oral at bedtime  venlafaxine XR. 150 milliGRAM(s) Oral <User Schedule>      RECENT CULTURES:  10-18 @ 15:15 Tissue Enterococcus faecalis    Moderate Enterococcus faecalis  Numerous Finegoldia magna "Susceptibilities not performed"  Rare Prevotella disiens "Susceptibilities not performed"  No polymorphonuclear cells seen per low power field  Few-moderate Gram positive cocci in pairs seen per oil power field      WBC Count: 5.11 K/uL (10-24-24 @ 05:30)    Creatinine: 0.53 mg/dL (10-24-24 @ 05:30)                            < from: CT Abdomen and Pelvis w/ Oral Cont and w/ IV Cont (10.22.24 @ 18:51) >  ACC: [id number] EXAM:  CT ABDOMEN AND PELVIS OC IC   ORDERED BY: JASON RIVAS     PROCEDURE DATE:  10/22/2024      INTERPRETATION:  CLINICAL INFORMATION: Polymicrobial infection.    COMPARISON: 9/13/2019    CONTRAST/COMPLICATIONS:  IV Contrast: Omnipaque 350  90 cc administered   10 cc discarded  Oral Contrast: Gastroview  Complications: None reported at time of study completion    PROCEDURE:  CT of the Abdomen and Pelvis was performed.  Sagittal and coronal reformats were performed.    FINDINGS:  LOWER CHEST: Trace bilateral pleural effusions. Passive atelectasis at   the lung bases.    LIVER: Subcentimeter lesion right hepatic lobe too small to characterize,   likely a cyst.  BILE DUCTS: Normal caliber.  GALLBLADDER: Within normal limits.  SPLEEN: Within normal limits.  PANCREAS: Within normal limits.  ADRENALS: Within normal limits.  KIDNEYS/URETERS: Bilateral renal cortical scarring. Renal cysts and   lesions too small to characterize. Peripherally calcified right renal   cyst measuring 1.4 cm.    BLADDER: Air, likely related to instrumentation.  REPRODUCTIVE ORGANS: Uterus and bilateral adnexa within normal limits.    BOWEL: Small hiatal hernia. No bowel obstruction. Appendix is not   visualized. Stool throughout the colon.  PERITONEUM/RETROPERITONEUM: Within normal limits.  VESSELS: Atherosclerotic calcifications.  LYMPH NODES: No lymphadenopathy.  ABDOMINAL WALL: Mild subcutaneous edema.  BONES: Osteopenia and degenerative changes. Posterior fusion extending   fromL4 through S1. Bilateral total hip arthroplasty. Fracture of the   right iliac bone with fracture lines still evident. Sclerosis of the   surrounding bony structures.. Processes and nondisplaced fracture is also   present in the left pubic bone.    IMPRESSION:  *  No evidence of acute infectious process in the abdomen and pelvis.  *  Comminuted fracture of the left iliac bone and nondisplaced fracture   of the left pubic bone with surrounding sclerosis of the bony structures,   possibly related to healing. Further evaluation with MRI of the bony   pelvis is recommended to evaluate for potential pathologic fracture.    --- End of Report ---    < end of copied text >       < from: TTE W or WO Ultrasound Enhancing Agent (10.22.24 @ 12:34) >  CONCLUSIONS:      1. Left ventricular cavity is normal in size. Left ventricular systolic function is mildly decreased.   2. Basal and mid inferolateral wall and basal inferior segment are abnormal.   3. Normal right ventricular cavity size and normal right ventricular systolic function.   4. Left atrium is normal in size.   5. No pericardial effusion seen.   6. No left ventricular hypertrophy.    < end of copied text >

## 2024-10-29 NOTE — DISCHARGE NOTE NURSING/CASE MANAGEMENT/SOCIAL WORK - FINANCIAL ASSISTANCE
[facility] provides services at a reduced cost to those who are determined to be eligible through [facility]’s financial assistance program. Information regarding [facility]’s financial assistance program can be found by going to [email or web address] or by calling [phone].

## 2024-10-29 NOTE — PROGRESS NOTE ADULT - TIME BILLING
This includes chart review, patient assessment, discussion with Clinical pharmacy, Orthopedics and Dr Alvarado. Antibiotic dosing and management with clinical pharmacy.

## 2024-10-29 NOTE — DISCHARGE NOTE NURSING/CASE MANAGEMENT/SOCIAL WORK - PATIENT PORTAL LINK FT
You can access the FollowMyHealth Patient Portal offered by [facility] by registering at the following website: [email or web address]. By joining [employer]’s FollowMyHealth portal, you will also be able to view your health information using other applications (apps) compatible with our system.

## 2024-10-29 NOTE — PROGRESS NOTE ADULT - SUBJECTIVE AND OBJECTIVE BOX
Patient is a 89y Female being followed for status post right total knee I&D with poly swap, patellectomy, gastroc flap w/ skin graft on POD #11. Patient is doing well and is comfortable. The patient's pain is controlled using the prescribed pain medications. The patient is participating in physical therapy. Denies nausea, vomiting, chest pain, shortness of breath, abdominal pain or fever. No new complaints.  Awaiting PICC placement and discharge to rehab        Vital Signs Last 24 Hrs  T(C): 36.8 (29 Oct 2024 04:34), Max: 36.8 (29 Oct 2024 04:34)  T(F): 98.3 (29 Oct 2024 04:34), Max: 98.3 (29 Oct 2024 04:34)  HR: 97 (29 Oct 2024 04:34) (68 - 97)  BP: 139/56 (29 Oct 2024 04:34) (126/69 - 139/56)  BP(mean): --  RR: 18 (29 Oct 2024 04:34) (18 - 18)  SpO2: 93% (29 Oct 2024 04:34) (93% - 94%)    Parameters below as of 29 Oct 2024 04:34  Patient On (Oxygen Delivery Method): room air            PHYSICAL EXAM:    Appearance: Alert, responsive, in no acute distress.  MSK:  Knee immobilizer noted. The right knee ACE is clean, dry and intact.  webril with dried drainage, dressing  removed.  Sutures in place no active drainage to incision sites.  Right thigh donor site with blood staining noted.  New dressing placed. knee immobilizer placed. The calf is supple nontender. Sensation to light touch is grossly intact distally. Motor function distally is 5/5. Extensor hallucis longus and flexor hallucis longus are intact. No foot drop. 2+ dorsalis pedis pulse. Capillary refill is less than 2 seconds. No cyanosis.      A/P:  Pt is a  89y Female status post right total knee I&D with poly swap, patellectomy, gastroc flap w/ skin graft on POD #11    Plan:   • DVT prophylaxis as prescribed, including use of compression devices and ankle pumps  • Continue physical therapy  * ABX per ID, ampicillin 2g q 6 until 11/28  * Metronidazole 500mg bid until 11/28  * cont. right thigh donor site dressing for ~1 week  • Weightbearing as tolerated of the right lower extremity in knee immobilizer at all times (20 min or less)  • Incentive spirometry encouraged  • Pain control as clinically indicated  • Discharge planning

## 2024-10-29 NOTE — PROGRESS NOTE ADULT - PROVIDER SPECIALTY LIST ADULT
Hospitalist
Infectious Disease
Orthopedics
Plastic Surgery
Plastic Surgery
Hospitalist
Infectious Disease
Infectious Disease
Orthopedics
Hospitalist
Infectious Disease
Orthopedics
Infectious Disease

## 2024-11-16 ENCOUNTER — NON-APPOINTMENT (OUTPATIENT)
Age: 89
End: 2024-11-16

## 2024-11-18 ENCOUNTER — APPOINTMENT (OUTPATIENT)
Dept: ORTHOPEDIC SURGERY | Facility: CLINIC | Age: 89
End: 2024-11-18
Payer: MEDICARE

## 2024-11-18 VITALS
BODY MASS INDEX: 20.24 KG/M2 | WEIGHT: 110 LBS | SYSTOLIC BLOOD PRESSURE: 110 MMHG | HEIGHT: 62 IN | DIASTOLIC BLOOD PRESSURE: 68 MMHG | HEART RATE: 88 BPM

## 2024-11-18 PROCEDURE — 73562 X-RAY EXAM OF KNEE 3: CPT | Mod: RT

## 2024-11-18 PROCEDURE — 99024 POSTOP FOLLOW-UP VISIT: CPT

## 2024-11-26 PROCEDURE — 36430 TRANSFUSION BLD/BLD COMPNT: CPT

## 2024-11-26 PROCEDURE — 82550 ASSAY OF CK (CPK): CPT

## 2024-11-26 PROCEDURE — 80202 ASSAY OF VANCOMYCIN: CPT

## 2024-11-26 PROCEDURE — 86900 BLOOD TYPING SEROLOGIC ABO: CPT

## 2024-11-26 PROCEDURE — 93970 EXTREMITY STUDY: CPT

## 2024-11-26 PROCEDURE — 93306 TTE W/DOPPLER COMPLETE: CPT

## 2024-11-26 PROCEDURE — 86922 COMPATIBILITY TEST ANTIGLOB: CPT

## 2024-11-26 PROCEDURE — 87186 SC STD MICRODIL/AGAR DIL: CPT

## 2024-11-26 PROCEDURE — C1713: CPT

## 2024-11-26 PROCEDURE — 86870 RBC ANTIBODY IDENTIFICATION: CPT

## 2024-11-26 PROCEDURE — C1776: CPT

## 2024-11-26 PROCEDURE — 85610 PROTHROMBIN TIME: CPT

## 2024-11-26 PROCEDURE — 84443 ASSAY THYROID STIM HORMONE: CPT

## 2024-11-26 PROCEDURE — 93005 ELECTROCARDIOGRAM TRACING: CPT

## 2024-11-26 PROCEDURE — 97163 PT EVAL HIGH COMPLEX 45 MIN: CPT

## 2024-11-26 PROCEDURE — 87077 CULTURE AEROBIC IDENTIFY: CPT

## 2024-11-26 PROCEDURE — 87640 STAPH A DNA AMP PROBE: CPT

## 2024-11-26 PROCEDURE — 87070 CULTURE OTHR SPECIMN AEROBIC: CPT

## 2024-11-26 PROCEDURE — 74177 CT ABD & PELVIS W/CONTRAST: CPT | Mod: MC

## 2024-11-26 PROCEDURE — 86850 RBC ANTIBODY SCREEN: CPT

## 2024-11-26 PROCEDURE — 85027 COMPLETE CBC AUTOMATED: CPT

## 2024-11-26 PROCEDURE — 85730 THROMBOPLASTIN TIME PARTIAL: CPT

## 2024-11-26 PROCEDURE — 86880 COOMBS TEST DIRECT: CPT

## 2024-11-26 PROCEDURE — 87641 MR-STAPH DNA AMP PROBE: CPT

## 2024-11-26 PROCEDURE — 85025 COMPLETE CBC W/AUTO DIFF WBC: CPT

## 2024-11-26 PROCEDURE — 86901 BLOOD TYPING SEROLOGIC RH(D): CPT

## 2024-11-26 PROCEDURE — 80053 COMPREHEN METABOLIC PANEL: CPT

## 2024-11-26 PROCEDURE — 36415 COLL VENOUS BLD VENIPUNCTURE: CPT

## 2024-11-26 PROCEDURE — 99285 EMERGENCY DEPT VISIT HI MDM: CPT | Mod: 25

## 2024-11-26 PROCEDURE — 73562 X-RAY EXAM OF KNEE 3: CPT

## 2024-11-26 PROCEDURE — 71045 X-RAY EXAM CHEST 1 VIEW: CPT

## 2024-11-26 PROCEDURE — 87075 CULTR BACTERIA EXCEPT BLOOD: CPT

## 2024-11-26 PROCEDURE — 84100 ASSAY OF PHOSPHORUS: CPT

## 2024-11-26 PROCEDURE — 80048 BASIC METABOLIC PNL TOTAL CA: CPT

## 2024-11-26 PROCEDURE — P9016: CPT

## 2024-11-26 PROCEDURE — 0001U RBC DNA HEA 35 AG 11 BLD GRP: CPT

## 2024-11-26 PROCEDURE — 83735 ASSAY OF MAGNESIUM: CPT

## 2024-11-27 ENCOUNTER — APPOINTMENT (OUTPATIENT)
Dept: ORTHOPEDIC SURGERY | Facility: CLINIC | Age: 89
End: 2024-11-27
Payer: MEDICARE

## 2024-11-27 PROCEDURE — 99442: CPT | Mod: 93

## 2024-12-04 ENCOUNTER — APPOINTMENT (OUTPATIENT)
Dept: ORTHOPEDIC SURGERY | Facility: CLINIC | Age: 89
End: 2024-12-04
Payer: MEDICARE

## 2024-12-04 PROCEDURE — 99024 POSTOP FOLLOW-UP VISIT: CPT

## 2024-12-11 ENCOUNTER — APPOINTMENT (OUTPATIENT)
Dept: ORTHOPEDIC SURGERY | Facility: CLINIC | Age: 89
End: 2024-12-11
Payer: MEDICARE

## 2024-12-11 DIAGNOSIS — S86.811D STRAIN OF OTHER MUSCLE(S) AND TENDON(S) AT LOWER LEG LEVEL, RIGHT LEG, SUBSEQUENT ENCOUNTER: ICD-10-CM

## 2024-12-11 DIAGNOSIS — T84.53XA INFECTION AND INFLAMMATORY REACTION DUE TO INTERNAL RIGHT KNEE PROSTHESIS, INITIAL ENCOUNTER: ICD-10-CM

## 2024-12-11 DIAGNOSIS — Z47.1 AFTERCARE FOLLOWING JOINT REPLACEMENT SURGERY: ICD-10-CM

## 2024-12-11 DIAGNOSIS — Z96.651 AFTERCARE FOLLOWING JOINT REPLACEMENT SURGERY: ICD-10-CM

## 2024-12-11 PROCEDURE — 99442: CPT | Mod: 93

## 2024-12-25 ENCOUNTER — NON-APPOINTMENT (OUTPATIENT)
Age: 89
End: 2024-12-25

## 2025-01-01 ENCOUNTER — NON-APPOINTMENT (OUTPATIENT)
Age: 89
End: 2025-01-01

## 2025-01-06 ENCOUNTER — APPOINTMENT (OUTPATIENT)
Dept: ORTHOPEDIC SURGERY | Facility: CLINIC | Age: 89
End: 2025-01-06
Payer: MEDICARE

## 2025-01-06 DIAGNOSIS — T84.53XA INFECTION AND INFLAMMATORY REACTION DUE TO INTERNAL RIGHT KNEE PROSTHESIS, INITIAL ENCOUNTER: ICD-10-CM

## 2025-01-06 DIAGNOSIS — Z96.651 AFTERCARE FOLLOWING JOINT REPLACEMENT SURGERY: ICD-10-CM

## 2025-01-06 DIAGNOSIS — Z47.1 AFTERCARE FOLLOWING JOINT REPLACEMENT SURGERY: ICD-10-CM

## 2025-01-06 PROCEDURE — 99213 OFFICE O/P EST LOW 20 MIN: CPT | Mod: 24

## 2025-01-30 ENCOUNTER — NON-APPOINTMENT (OUTPATIENT)
Age: 89
End: 2025-01-30

## 2025-07-21 ENCOUNTER — NON-APPOINTMENT (OUTPATIENT)
Age: 89
End: 2025-07-21

## (undated) DEVICE — STAPLER SKIN PROXIMATE

## (undated) DEVICE — DRAPE MAYO STAND 23"

## (undated) DEVICE — ELCTR GROUNDING PAD ADULT COVIDIEN

## (undated) DEVICE — GLV 8 PROTEXIS ORTHO (BROWN)

## (undated) DEVICE — SUT VICRYL 0 27" CT-1 UNDYED

## (undated) DEVICE — ELCTR STRYKER NEPTUNE SMOKE EVACUATION PENCIL (GREEN)

## (undated) DEVICE — WARMING BLANKET UPPER ADULT

## (undated) DEVICE — SUT VICRYL 2-0 27" CT-2 UNDYED

## (undated) DEVICE — SOL IRR POUR H2O 1000ML

## (undated) DEVICE — DRAPE C ARM UNIVERSAL

## (undated) DEVICE — DRSG WEBRIL 6"

## (undated) DEVICE — DRAPE SPLIT SHEET 77" X 108"

## (undated) DEVICE — SYR LUER LOK 30CC

## (undated) DEVICE — SOL IRR POUR NS 0.9% 1000ML

## (undated) DEVICE — DRAIN RESERVOIR FOR JACKSON PRATT 100CC CARDINAL

## (undated) DEVICE — GLV 8 PROTEXIS (BLUE)

## (undated) DEVICE — VENODYNE/SCD SLEEVE CALF MEDIUM

## (undated) DEVICE — Device

## (undated) DEVICE — PACK EXTREMITY